# Patient Record
Sex: FEMALE | Race: WHITE | NOT HISPANIC OR LATINO | Employment: FULL TIME | URBAN - METROPOLITAN AREA
[De-identification: names, ages, dates, MRNs, and addresses within clinical notes are randomized per-mention and may not be internally consistent; named-entity substitution may affect disease eponyms.]

---

## 2018-01-23 ENCOUNTER — CLINICAL SUPPORT (OUTPATIENT)
Dept: INTERNAL MEDICINE | Facility: CLINIC | Age: 43
End: 2018-01-23
Payer: COMMERCIAL

## 2018-01-23 PROCEDURE — 90715 TDAP VACCINE 7 YRS/> IM: CPT | Mod: S$GLB,,, | Performed by: FAMILY MEDICINE

## 2018-01-23 PROCEDURE — 90471 IMMUNIZATION ADMIN: CPT | Mod: S$GLB,,, | Performed by: FAMILY MEDICINE

## 2018-01-26 ENCOUNTER — LAB VISIT (OUTPATIENT)
Dept: LAB | Facility: OTHER | Age: 43
End: 2018-01-26
Attending: INTERNAL MEDICINE
Payer: COMMERCIAL

## 2018-01-26 DIAGNOSIS — Z00.00 ANNUAL PHYSICAL EXAM: ICD-10-CM

## 2018-01-26 LAB
ALBUMIN SERPL BCP-MCNC: 4.1 G/DL
ALP SERPL-CCNC: 39 U/L
ALT SERPL W/O P-5'-P-CCNC: 11 U/L
ANION GAP SERPL CALC-SCNC: 9 MMOL/L
AST SERPL-CCNC: 18 U/L
BASOPHILS # BLD AUTO: 0.01 K/UL
BASOPHILS NFR BLD: 0.2 %
BILIRUB SERPL-MCNC: 1 MG/DL
BUN SERPL-MCNC: 14 MG/DL
CALCIUM SERPL-MCNC: 8.7 MG/DL
CHLORIDE SERPL-SCNC: 104 MMOL/L
CHOLEST SERPL-MCNC: 242 MG/DL
CHOLEST/HDLC SERPL: 4.5 {RATIO}
CO2 SERPL-SCNC: 26 MMOL/L
CREAT SERPL-MCNC: 0.9 MG/DL
DIFFERENTIAL METHOD: NORMAL
EOSINOPHIL # BLD AUTO: 0.1 K/UL
EOSINOPHIL NFR BLD: 1.6 %
ERYTHROCYTE [DISTWIDTH] IN BLOOD BY AUTOMATED COUNT: 12.1 %
EST. GFR  (AFRICAN AMERICAN): >60 ML/MIN/1.73 M^2
EST. GFR  (NON AFRICAN AMERICAN): >60 ML/MIN/1.73 M^2
ESTIMATED AVG GLUCOSE: 94 MG/DL
GLUCOSE SERPL-MCNC: 84 MG/DL
HBA1C MFR BLD HPLC: 4.9 %
HCT VFR BLD AUTO: 39.8 %
HDLC SERPL-MCNC: 54 MG/DL
HDLC SERPL: 22.3 %
HGB BLD-MCNC: 13.2 G/DL
LDLC SERPL CALC-MCNC: 165.6 MG/DL
LYMPHOCYTES # BLD AUTO: 2.2 K/UL
LYMPHOCYTES NFR BLD: 37.9 %
MCH RBC QN AUTO: 29.2 PG
MCHC RBC AUTO-ENTMCNC: 33.2 G/DL
MCV RBC AUTO: 88 FL
MONOCYTES # BLD AUTO: 0.5 K/UL
MONOCYTES NFR BLD: 8 %
NEUTROPHILS # BLD AUTO: 3 K/UL
NEUTROPHILS NFR BLD: 52.1 %
NONHDLC SERPL-MCNC: 188 MG/DL
PLATELET # BLD AUTO: 215 K/UL
PMV BLD AUTO: 11.3 FL
POTASSIUM SERPL-SCNC: 4.3 MMOL/L
PROT SERPL-MCNC: 7.1 G/DL
RBC # BLD AUTO: 4.52 M/UL
SODIUM SERPL-SCNC: 139 MMOL/L
TRIGL SERPL-MCNC: 112 MG/DL
TSH SERPL DL<=0.005 MIU/L-ACNC: 1.47 UIU/ML
WBC # BLD AUTO: 5.75 K/UL

## 2018-01-26 PROCEDURE — 84443 ASSAY THYROID STIM HORMONE: CPT

## 2018-01-26 PROCEDURE — 36415 COLL VENOUS BLD VENIPUNCTURE: CPT

## 2018-01-26 PROCEDURE — 80061 LIPID PANEL: CPT

## 2018-01-26 PROCEDURE — 85025 COMPLETE CBC W/AUTO DIFF WBC: CPT

## 2018-01-26 PROCEDURE — 80053 COMPREHEN METABOLIC PANEL: CPT

## 2018-01-26 PROCEDURE — 83036 HEMOGLOBIN GLYCOSYLATED A1C: CPT

## 2018-05-16 ENCOUNTER — HOSPITAL ENCOUNTER (OUTPATIENT)
Dept: RADIOLOGY | Facility: OTHER | Age: 43
Discharge: HOME | End: 2018-05-16
Attending: INTERNAL MEDICINE
Payer: COMMERCIAL

## 2018-05-16 ENCOUNTER — OFFICE VISIT (OUTPATIENT)
Dept: INTERNAL MEDICINE | Facility: CLINIC | Age: 43
End: 2018-05-16
Attending: INTERNAL MEDICINE
Payer: COMMERCIAL

## 2018-05-16 VITALS — WEIGHT: 157.19 LBS | BODY MASS INDEX: 25.26 KG/M2 | HEIGHT: 66 IN

## 2018-05-16 VITALS
WEIGHT: 157.19 LBS | HEART RATE: 68 BPM | DIASTOLIC BLOOD PRESSURE: 58 MMHG | SYSTOLIC BLOOD PRESSURE: 124 MMHG | BODY MASS INDEX: 25.26 KG/M2 | HEIGHT: 66 IN

## 2018-05-16 DIAGNOSIS — E04.1 THYROID NODULE: ICD-10-CM

## 2018-05-16 DIAGNOSIS — T78.3XXS ANGIOEDEMA, SEQUELA: ICD-10-CM

## 2018-05-16 DIAGNOSIS — Z12.39 BREAST CANCER SCREENING: ICD-10-CM

## 2018-05-16 DIAGNOSIS — Z00.00 ANNUAL PHYSICAL EXAM: Primary | ICD-10-CM

## 2018-05-16 DIAGNOSIS — Z88.9 MULTIPLE ALLERGIES: ICD-10-CM

## 2018-05-16 DIAGNOSIS — Z01.419 WELL WOMAN EXAM: ICD-10-CM

## 2018-05-16 PROBLEM — T78.3XXA ANGIO-EDEMA: Status: ACTIVE | Noted: 2018-05-16

## 2018-05-16 PROCEDURE — 99999 PR PBB SHADOW E&M-EST. PATIENT-LVL IV: CPT | Mod: PBBFAC,,, | Performed by: INTERNAL MEDICINE

## 2018-05-16 PROCEDURE — 77067 SCR MAMMO BI INCL CAD: CPT | Mod: 26,,, | Performed by: RADIOLOGY

## 2018-05-16 PROCEDURE — 77063 BREAST TOMOSYNTHESIS BI: CPT | Mod: 26,,, | Performed by: RADIOLOGY

## 2018-05-16 PROCEDURE — 77067 SCR MAMMO BI INCL CAD: CPT | Mod: TC

## 2018-05-16 PROCEDURE — 99396 PREV VISIT EST AGE 40-64: CPT | Mod: S$GLB,,, | Performed by: INTERNAL MEDICINE

## 2018-05-16 PROCEDURE — 76536 US EXAM OF HEAD AND NECK: CPT | Mod: TC

## 2018-05-16 PROCEDURE — 76536 US EXAM OF HEAD AND NECK: CPT | Mod: 26,,, | Performed by: INTERNAL MEDICINE

## 2018-05-16 RX ORDER — EPINEPHRINE 0.3 MG/.3ML
1 INJECTION SUBCUTANEOUS ONCE
Qty: 2 EACH | Refills: 5 | Status: SHIPPED | OUTPATIENT
Start: 2018-05-16 | End: 2018-12-18

## 2018-05-16 NOTE — PROGRESS NOTES
Subjective:       Patient ID: Karla Washington is a 43 y.o. female.    Chief Complaint: Sore Throat    Here for annual exam    Just so happens just a few days prior to appt she developed sloughing of skin inside her mouth followed by tongue and mouth ulcers with sharp pain. These symptoms persisted about 36-48 hours and as they resolved she developed sore throat. She reports her throat felt swollen along with her lips felt slightly swollen. She denies tongue involvement. She denies SOB, stridor or wheezing, dizziness, pruritis, rash, change in arthralgias from baseline, abd pain. She reports a Hx of similar but more pronounced episode in the past.     She is due for MMG.   Due for u/s of thyroid due to Hx of nodules. Euthyroid aside from intermittent fatigue      Sore Throat    This is a new problem. The current episode started in the past 7 days. The problem has been gradually worsening. The pain is worse on the left side. There has been no fever. The pain is at a severity of 7/10. The pain is moderate. Associated symptoms include ear pain, headaches, a hoarse voice, neck pain and trouble swallowing. Pertinent negatives include no abdominal pain, congestion, coughing, diarrhea, drooling, ear discharge, plugged ear sensation, shortness of breath, stridor, swollen glands or vomiting. She has had no exposure to strep or mono. She has tried NSAIDs, cool liquids and gargles for the symptoms. The treatment provided mild relief.       Review of Systems   HENT: Positive for ear pain, hoarse voice, sore throat and trouble swallowing. Negative for congestion, drooling and ear discharge.    Respiratory: Negative for cough, shortness of breath and stridor.    Gastrointestinal: Negative for abdominal pain, diarrhea and vomiting.   Musculoskeletal: Positive for neck pain.   Neurological: Positive for headaches.       Objective:      Vitals:    05/16/18 1304   BP: (!) 124/58   Pulse: 68   Weight: 71.3 kg (157 lb 3 oz)   Height: 5'  "6" (1.676 m)      Physical Exam   Constitutional: She is oriented to person, place, and time. She appears well-developed and well-nourished. No distress.   HENT:   Head: Normocephalic and atraumatic.   Mouth/Throat: Uvula is midline, oropharynx is clear and moist and mucous membranes are normal. Mucous membranes are not pale. No trismus in the jaw. No uvula swelling. No oropharyngeal exudate, posterior oropharyngeal edema, posterior oropharyngeal erythema or tonsillar abscesses.   Eyes: Conjunctivae and EOM are normal. Pupils are equal, round, and reactive to light. No scleral icterus.   Neck: No tracheal tenderness present. No thyromegaly present.   Cardiovascular: Normal rate, regular rhythm and normal heart sounds.    No murmur heard.  Pulmonary/Chest: Effort normal and breath sounds normal. No stridor. No respiratory distress. She has no decreased breath sounds. She has no wheezes. She has no rales.   Abdominal: Soft. She exhibits no distension. There is no tenderness.   Musculoskeletal: She exhibits no edema or tenderness.   Lymphadenopathy:     She has no cervical adenopathy.   Neurological: She is alert and oriented to person, place, and time.   Skin: Skin is warm and dry.   Psychiatric: She has a normal mood and affect. Her behavior is normal.       Assessment:       1. Annual physical exam    2. Well woman exam    3. Angioedema, sequela    4. Multiple allergies    5. Breast cancer screening    6. Thyroid nodule        Plan:       Karla was seen today for sore throat.    Diagnoses and all orders for this visit:    Annual physical exam  -     Comprehensive metabolic panel; Future  -     Lipid panel; Future  -     TSH; Future  -     CBC auto differential; Future  -     Hemoglobin A1c; Future    Well woman exam  -     Ambulatory Referral to Obstetrics / Gynecology    Angioedema, sequela  -episode resolved start H1 and H2 blockers  -     Ambulatory Referral to Allergy  -     EPINEPHrine (EPIPEN 2-MALDONADO) 0.3 mg/0.3 " mL AtIn; Inject 0.3 mLs (0.3 mg total) into the muscle once.    Multiple allergies  -     Ambulatory Referral to Allergy    Breast cancer screening  -     Cancel: Mammo Digital Screening Bilat with CAD; Future    Thyroid nodule  -     US Soft Tissue Head Neck Thyroid; Future    RTC in 1 year or sooner prn                    Side effects of medication(s) were discussed in detail and patient voiced understanding.  Patient will call back for any issues or complications.

## 2018-05-17 ENCOUNTER — PATIENT MESSAGE (OUTPATIENT)
Dept: INTERNAL MEDICINE | Facility: CLINIC | Age: 43
End: 2018-05-17

## 2018-05-17 DIAGNOSIS — Z91.89 INCREASED RISK OF BREAST CANCER: Primary | ICD-10-CM

## 2018-05-17 DIAGNOSIS — R21 RASH: Primary | ICD-10-CM

## 2018-05-18 ENCOUNTER — OFFICE VISIT (OUTPATIENT)
Dept: DERMATOLOGY | Facility: CLINIC | Age: 43
End: 2018-05-18
Payer: COMMERCIAL

## 2018-05-18 ENCOUNTER — OFFICE VISIT (OUTPATIENT)
Dept: OBSTETRICS AND GYNECOLOGY | Facility: CLINIC | Age: 43
End: 2018-05-18
Payer: COMMERCIAL

## 2018-05-18 VITALS
HEIGHT: 66 IN | BODY MASS INDEX: 25.56 KG/M2 | WEIGHT: 159.06 LBS | DIASTOLIC BLOOD PRESSURE: 64 MMHG | SYSTOLIC BLOOD PRESSURE: 114 MMHG

## 2018-05-18 DIAGNOSIS — K12.0 ORAL APHTHOUS ULCER: Primary | ICD-10-CM

## 2018-05-18 DIAGNOSIS — Z01.419 WOMEN'S ANNUAL ROUTINE GYNECOLOGICAL EXAMINATION: Primary | ICD-10-CM

## 2018-05-18 DIAGNOSIS — D18.00 ANGIOMA: ICD-10-CM

## 2018-05-18 PROCEDURE — 99999 PR PBB SHADOW E&M-EST. PATIENT-LVL II: CPT | Mod: PBBFAC,,, | Performed by: DERMATOLOGY

## 2018-05-18 PROCEDURE — 3008F BODY MASS INDEX DOCD: CPT | Mod: CPTII,S$GLB,, | Performed by: ADVANCED PRACTICE MIDWIFE

## 2018-05-18 PROCEDURE — 99999 PR PBB SHADOW E&M-EST. PATIENT-LVL III: CPT | Mod: PBBFAC,,, | Performed by: ADVANCED PRACTICE MIDWIFE

## 2018-05-18 PROCEDURE — 99202 OFFICE O/P NEW SF 15 MIN: CPT | Mod: S$GLB,,, | Performed by: DERMATOLOGY

## 2018-05-18 PROCEDURE — 99396 PREV VISIT EST AGE 40-64: CPT | Mod: S$GLB,,, | Performed by: ADVANCED PRACTICE MIDWIFE

## 2018-05-18 PROCEDURE — 88175 CYTOPATH C/V AUTO FLUID REDO: CPT

## 2018-05-18 NOTE — LETTER
May 18, 2018      Riana Arroyo MD  [address]  [place] [address]           [facility]  [address]  [place] [address]  Phone: [phone]  Fax: [phone]          Patient: Karla Washington   MR Number: [id number]   YOB: 1975   Date of Visit: 5/18/2018       Dear Dr. Riana Arroyo:    Thank you for referring Karla Washington to me for evaluation. Attached you will find relevant portions of my assessment and plan of care.    If you have questions, please do not hesitate to call me. I look forward to following Karla Washington along with you.    Sincerely,    Elle Smith MD    Enclosure  CC:  No Recipients    If you would like to receive this communication electronically, please contact [email or web address] or [phone] to request more information on [facility] Link access.    For providers and/or their staff who would like to refer a patient to [facility], please contact us through our one-stop-shop provider referral line, [facility], at [phone].    If you feel you have received this communication in error or would no longer like to receive these types of communications, please e-mail [email or web address]

## 2018-05-18 NOTE — PROGRESS NOTES
Subjective:       Patient ID:  Karla Washington is a 43 y.o. female who presents for   Chief Complaint   Patient presents with    Rash     in mouth, x 12 days, painful, peeling, no tx     Rash  - Initial  Affected locations: mouth.  Duration: 12 days  Signs / symptoms: pain  Relieving factors/Treatments tried: nothing    44 yo female with history of canker sores, longstanding, well controlled recently by limiting acidic foods.  Has had + recent life stressors.  About 2 weeks ago, patient developed peeling of the hard palate of top of mouth and extended down to her throat.  Had associated feeling of fullness and swelling in cheek, tongue and lip.  No shortness of breath.  She had no blisters there nor was this a one sided eruption.  No fevers/chills/antecedent viral symptoms.  No prior history of HSV.  No prior hx of SLE.  No new medications (takes NSAIDS and b12 sporadically)    Was seen by urgent care who gave her Epipen out of concern for allergic reaction. Saw allergist who did not feel consistent with angioedema.  Has not yet seen ENT.    Right now mouth has one small ulceration on right side that is healing.  Palate now better but slightly pale.    Review of Systems   Constitutional: Positive for fatigue (yesterday very tired). Negative for fever and chills.   HENT: Positive for sore throat, mouth sores, lip swelling and tongue swelling. Negative for trouble swallowing.    Musculoskeletal: Negative for joint swelling and arthralgias.   Skin: Positive for rash.   Psychiatric/Behavioral: Positive for high stress.   Hematologic/Lymphatic: Does not bruise/bleed easily.        Objective:    Physical Exam   Constitutional: She appears well-developed and well-nourished. No distress.   HENT:   Mouth/Throat:       Neurological: She is alert and oriented to person, place, and time. She is not disoriented.   Psychiatric: She has a normal mood and affect.   Skin:   Areas Examined (abnormalities noted in diagram):   Head /  Face Inspection Performed  Neck Inspection Performed  LLE Inspection Performed              Diagram Legend     Erythematous scaling macule/papule c/w actinic keratosis       Vascular papule c/w angioma      Pigmented verrucoid papule/plaque c/w seborrheic keratosis      Yellow umbilicated papule c/w sebaceous hyperplasia      Irregularly shaped tan macule c/w lentigo     1-2 mm smooth white papules consistent with Milia      Movable subcutaneous cyst with punctum c/w epidermal inclusion cyst      Subcutaneous movable cyst c/w pilar cyst      Firm pink to brown papule c/w dermatofibroma      Pedunculated fleshy papule(s) c/w skin tag(s)      Evenly pigmented macule c/w junctional nevus     Mildly variegated pigmented, slightly irregular-bordered macule c/w mildly atypical nevus      Flesh colored to evenly pigmented papule c/w intradermal nevus       Pink pearly papule/plaque c/w basal cell carcinoma      Erythematous hyperkeratotic cursted plaque c/w SCC      Surgical scar with no sign of skin cancer recurrence      Open and closed comedones      Inflammatory papules and pustules      Verrucoid papule consistent consistent with wart     Erythematous eczematous patches and plaques     Dystrophic onycholytic nail with subungual debris c/w onychomycosis     Umbilicated papule    Erythematous-base heme-crusted tan verrucoid plaque consistent with inflamed seborrheic keratosis     Erythematous Silvery Scaling Plaque c/w Psoriasis     See annotation    Lab Results   Component Value Date    WBC 5.41 05/16/2018    HGB 13.9 05/16/2018    HCT 42.0 05/16/2018    MCV 91 05/16/2018     05/16/2018     BMP  Lab Results   Component Value Date     05/16/2018    K 3.7 05/16/2018     05/16/2018    CO2 27 05/16/2018    BUN 7 05/16/2018    CREATININE 0.9 05/16/2018    CALCIUM 9.3 05/16/2018    ANIONGAP 10 05/16/2018    ESTGFRAFRICA >60 05/16/2018    EGFRNONAA >60 05/16/2018       Assessment / Plan:        Oral  "aphthous ulcer - I see one small healing canker sore today with classic appearance, consistent with her prior history of canker sores.  Stress is a well known cause of aphthous stomatitis.      I discussed the dermatologic conditions that can lead to recurrent oral ulcerations - autoimmune conditions such as SLE, Behcets, cicatricial pemphigoid, pemphigus vulgaris.  Viruses such as HSV, CMV in immunosuppressed individuals (which she is not).  She has no history of IBD.  Contact allergies to metals of fillings (she has had her changed out to try to help, which it did not) and new toothpastes (she has not changed).   No antecedent infections or medication use. No history of genital ulcers. No history of blisters on the skin, lips, genitals, ocular mucosa.    Given her ongoing dysphagia, I advise evaluation by ENT.  Apparently in the past she was told that she had "blood blisters" in her throat.   I would appreciate their input and evaluation.    She declines treatment at this time as the current canker sore is healing; typically I treat with high potency topical steroid ointment in orabase gel, or viscous lidocaine.    -     Ambulatory Referral to ENT    Angioma    This is a benign vascular lesion. Reassurance given. No treatment required.              Follow-up if symptoms worsen or fail to improve, for for TBSE.  "

## 2018-05-18 NOTE — PROGRESS NOTES
HISTORY OF PRESENT ILLNESS:    Karla Washington is a 43 y.o. female, , Patient's last menstrual period was 2018 (exact date).,  presents for a routine annual exam . Pt has no complaints or concerns.    Past Medical History:   Diagnosis Date    Abnormal Pap smear of cervix     Angio-edema     Fibroid of cervix        Past Surgical History:   Procedure Laterality Date    TONSILLECTOMY         MEDICATIONS AND ALLERGIES:      Current Outpatient Prescriptions:     l-methylfolate-b2-b6-b12 (CEREFOLIN) 6-5-50-1 mg Tab, Take 1 tablet by mouth once daily., Disp: , Rfl:     naproxen sodium (ANAPROX) 220 MG tablet, Take 2 tablets (440 mg total) by mouth 2 (two) times daily with meals., Disp: , Rfl:     EPINEPHrine (EPIPEN 2-MALDONADO) 0.3 mg/0.3 mL AtIn, Inject 0.3 mLs (0.3 mg total) into the muscle once., Disp: 2 each, Rfl: 5    Review of patient's allergies indicates:   Allergen Reactions    Pcn [penicillins] Hives       Family History   Problem Relation Age of Onset    Thyroid disease Mother     Melanoma Mother     Alcohol abuse Father     Alcohol abuse Sister     Alcohol abuse Brother     Alcohol abuse Maternal Aunt     Thyroid disease Sister     Thyroid disease Paternal Grandmother     Colon cancer Maternal Grandmother     Breast cancer Paternal Aunt     Ovarian cancer Neg Hx        Social History     Social History    Marital status:      Spouse name: N/A    Number of children: 0    Years of education: N/A     Occupational History    Not on file.     Social History Main Topics    Smoking status: Never Smoker    Smokeless tobacco: Never Used    Alcohol use 3.6 oz/week     6 Glasses of wine per week      Comment: occ    Drug use: No    Sexual activity: Yes     Partners: Male     Birth control/ protection: None     Other Topics Concern    Not on file     Social History Narrative    The patient does exercise regularly (5-6x/week; ).    Rates diet as good.    She is  "not satisfied with weight.               COMPREHENSIVE GYN HISTORY:  PAP History: Denies abnormal Paps.  Infection History: Denies STDs. Denies PID.  Benign History: Denies uterine fibroids. Denies ovarian cysts. Denies endometriosis. Denies other conditions.  Cancer History: Denies cervical cancer. Denies uterine cancer or hyperplasia. Denies ovarian cancer. Denies vulvar cancer or pre-cancer. Denies vaginal cancer or pre-cancer. Denies breast cancer. Denies colon cancer.  Sexual Activity History:  currently  sexually active  Menstrual History: Monthly  Dysmenorrhea History:None  Contraception:None- hx infertility    ROS:  GENERAL: No weight changes. No swelling. No fatigue. No fever.  CARDIOVASCULAR: No chest pain. No shortness of breath. No leg cramps.   NEUROLOGICAL: No headaches. No vision changes.  BREASTS: No pain. No lumps. No discharge.  ABDOMEN: No pain. No nausea. No vomiting. No diarrhea. No constipation.  REPRODUCTIVE: No abnormal bleeding.   VULVA: No pain. No lesions. No itching.  VAGINA: No relaxation. No itching. No odor. No discharge. No lesions.  URINARY: No incontinence. No nocturia. No frequency. No dysuria.    /64   Ht 5' 6" (1.676 m)   Wt 72.1 kg (159 lb 1 oz)   LMP 05/01/2018 (Exact Date)   BMI 25.67 kg/m²     PE:  APPEARANCE: Well nourished, well developed, in no acute distress.  AFFECT: WNL, alert and oriented x 3. Interactive during exam  SKIN: No acne or hirsutism.  NECK: Neck symmetric, without masses or thyromegaly.  NODES: No inguinal, axillary or femoral lymph node enlargement.  CHEST: Good respiratory effort.   ABDOMEN: Soft. No tenderness or masses. No hepatosplenomegaly. No hernias.  BREASTS: Symmetrical, no skin changes, visible lesions, palpable masses or nipple discharge bilaterally.  PELVIC: External female genitalia without lesions.  Female hair distribution. Adequate perineal body, Normal urethral meatus. Vagina moist and well rugated without lesions or discharge.  " No significant cystocele or rectocele present. Cervix pink without lesions, discharge or tenderness. Spotting with PAP Uterus is 4-6 week size, regular, mobile and nontender. Adnexa without masses or tenderness.  EXTREMITIES: No edema    PROCEDURES:  Pap    DIAGNOSIS:  1. Gyn exam    LABS AND TESTS ORDERED:None    MEDICATIONS PRESCRIBED:None    COUNSELING:  The patient was counseled today on:  -A.C.S. Pap and pelvic exam guidelines, recomendations for  mammogram, monthly self breast exams and to follow up with her PCP for other health maintenance.    FOLLOW-UP with me annually.

## 2018-05-21 ENCOUNTER — LAB VISIT (OUTPATIENT)
Dept: LAB | Facility: OTHER | Age: 43
End: 2018-05-21
Attending: INTERNAL MEDICINE
Payer: COMMERCIAL

## 2018-05-21 DIAGNOSIS — T78.3XXS ANGIOEDEMA, SEQUELA: ICD-10-CM

## 2018-05-21 LAB
C4 SERPL-MCNC: 20 MG/DL
CRP SERPL-MCNC: 1.2 MG/L
ERYTHROCYTE [SEDIMENTATION RATE] IN BLOOD BY WESTERGREN METHOD: 2 MM/HR

## 2018-05-21 PROCEDURE — 86140 C-REACTIVE PROTEIN: CPT

## 2018-05-21 PROCEDURE — 36415 COLL VENOUS BLD VENIPUNCTURE: CPT

## 2018-05-21 PROCEDURE — 85651 RBC SED RATE NONAUTOMATED: CPT

## 2018-05-21 PROCEDURE — 86160 COMPLEMENT ANTIGEN: CPT

## 2018-05-22 ENCOUNTER — PATIENT MESSAGE (OUTPATIENT)
Dept: INTERNAL MEDICINE | Facility: CLINIC | Age: 43
End: 2018-05-22

## 2018-05-22 ENCOUNTER — PATIENT MESSAGE (OUTPATIENT)
Dept: DERMATOLOGY | Facility: CLINIC | Age: 43
End: 2018-05-22

## 2018-05-23 ENCOUNTER — PATIENT MESSAGE (OUTPATIENT)
Dept: OBSTETRICS AND GYNECOLOGY | Facility: CLINIC | Age: 43
End: 2018-05-23

## 2018-05-23 ENCOUNTER — TELEPHONE (OUTPATIENT)
Dept: DERMATOLOGY | Facility: CLINIC | Age: 43
End: 2018-05-23

## 2018-05-23 DIAGNOSIS — K12.0 RECURRENT CANKER SORES: Primary | ICD-10-CM

## 2018-05-23 RX ORDER — CLOBETASOL PROPIONATE 0.05 MG/G
GEL TOPICAL 2 TIMES DAILY
Qty: 30 G | Refills: 2 | Status: SHIPPED | OUTPATIENT
Start: 2018-05-23 | End: 2018-12-18

## 2018-05-23 NOTE — TELEPHONE ENCOUNTER
Saw patient last week for evaluation of an aphthous ulcer, + recent life stressors.      She called today to report a new oral ulcer yesterday as well as a vulvar ulceration.  Both are quite painful.    No prior hx of HSV2 or 1, monogamous with  x 10 yrs who has had no herpes in past either.    Recent sore throat also thought to be apthous stomatitis vs viral - no fevers or lymphadenopathy as seen with mono/EBV which can also cause genital ulcers.    She on Sunday developed a headache after 1 alcoholic beverage and sun exposure - has been squinting in the sun since and needed to use 's reading glasses for sewing yesterday, which is unusual for her.    Rx sent for clobetasol ointment for ulcerations to help healing - advise she be evaluated by opthomalogy and rheumatology for possible Behcet's.

## 2018-05-29 ENCOUNTER — INITIAL CONSULT (OUTPATIENT)
Dept: OPHTHALMOLOGY | Facility: CLINIC | Age: 43
End: 2018-05-29
Payer: COMMERCIAL

## 2018-05-29 DIAGNOSIS — H53.143 PHOTOPHOBIA OF BOTH EYES: Primary | ICD-10-CM

## 2018-05-29 DIAGNOSIS — H52.7 REFRACTIVE ERRORS: ICD-10-CM

## 2018-05-29 DIAGNOSIS — K12.1 ORAL ULCER: ICD-10-CM

## 2018-05-29 PROCEDURE — 99999 PR PBB SHADOW E&M-EST. PATIENT-LVL II: CPT | Mod: PBBFAC,,, | Performed by: OPHTHALMOLOGY

## 2018-05-29 PROCEDURE — 92004 COMPRE OPH EXAM NEW PT 1/>: CPT | Mod: S$GLB,,, | Performed by: OPHTHALMOLOGY

## 2018-05-29 RX ORDER — MULTIVIT WITH MINERALS/HERBS
1 TABLET ORAL DAILY
COMMUNITY

## 2018-05-29 NOTE — PROGRESS NOTES
HPI     Referred by her Dermatologist - Dr. Elle Smith    No eyedrops  No eye surgery     Pt states she has sensitivity to light OU. Pt states she has had floaters   OU since she was 9 years old. Pt states she had a giant floater with grey   in the middle  in the center OS x 2 episodes.  Pt states she has itching   OU. Pt denies flashes or eye pain.    Last edited by Maame Reis MD on 5/29/2018  9:14 AM. (History)            Assessment /Plan     For exam results, see Encounter Report.    Photophobia of both eyes    Oral ulcer    Refractive errors      History of oral and vulval ulcers     Photophobia  - no evidence of uveitis on exam today    Pt to f/up with rheumatology for more extensive autoimmune w/up and evaluation, may benefit from HLA-B51    Refractive Error  - pt has natural monovision - no need for rx at this time.

## 2018-06-12 ENCOUNTER — PATIENT MESSAGE (OUTPATIENT)
Dept: OBSTETRICS AND GYNECOLOGY | Facility: CLINIC | Age: 43
End: 2018-06-12

## 2018-11-07 ENCOUNTER — PATIENT MESSAGE (OUTPATIENT)
Dept: INTERNAL MEDICINE | Facility: CLINIC | Age: 43
End: 2018-11-07

## 2018-11-07 DIAGNOSIS — R20.0 NUMBNESS OF TOES: Primary | ICD-10-CM

## 2018-11-08 ENCOUNTER — TELEPHONE (OUTPATIENT)
Dept: INTERNAL MEDICINE | Facility: CLINIC | Age: 43
End: 2018-11-08

## 2018-11-08 NOTE — TELEPHONE ENCOUNTER
Left voice message for patient to schedule appointment from referral to Podiatry Clinic.  Tonio SPENCER  [phone]

## 2018-11-15 ENCOUNTER — TELEPHONE (OUTPATIENT)
Dept: INTERNAL MEDICINE | Facility: CLINIC | Age: 43
End: 2018-11-15

## 2019-05-06 ENCOUNTER — OFFICE VISIT (OUTPATIENT)
Dept: OPTOMETRY | Facility: CLINIC | Age: 44
End: 2019-05-06
Payer: COMMERCIAL

## 2019-05-06 DIAGNOSIS — H43.392 VITREOUS FLOATERS OF LEFT EYE: Primary | ICD-10-CM

## 2019-05-06 PROCEDURE — 92004 COMPRE OPH EXAM NEW PT 1/>: CPT | Mod: S$GLB,,, | Performed by: OPTOMETRIST

## 2019-05-06 PROCEDURE — 99999 PR PBB SHADOW E&M-EST. PATIENT-LVL III: CPT | Mod: PBBFAC,,, | Performed by: OPTOMETRIST

## 2019-05-06 PROCEDURE — 92004 PR EYE EXAM, NEW PATIENT,COMPREHESV: ICD-10-PCS | Mod: S$GLB,,, | Performed by: OPTOMETRIST

## 2019-05-06 PROCEDURE — 99999 PR PBB SHADOW E&M-EST. PATIENT-LVL III: ICD-10-PCS | Mod: PBBFAC,,, | Performed by: OPTOMETRIST

## 2019-05-06 NOTE — PROGRESS NOTES
HPI     Pt is in for floater OS  Denies flashes OU   Large  New floater has been there since November  Centrally located,causing blurry va   overcast days notices it more.   Long, jagged in shape  Feels tired and achy towards the end of the day     Last edited by Nicky Mace on 5/6/2019  9:00 AM. (History)        ROS     Negative for: Constitutional, Gastrointestinal, Neurological, Skin,   Genitourinary, Musculoskeletal, HENT, Endocrine, Cardiovascular, Eyes,   Respiratory, Psychiatric, Allergic/Imm, Heme/Lymph    Last edited by Zaynab Trinh, OD on 5/6/2019  9:35 AM. (History)        Assessment /Plan     For exam results, see Encounter Report.    Vitreous floaters of left eye    No e/o h/b/t 360 degrees OU. Monitor for worsening of symptoms or S/Sx of RD. RTC 1 mo DFE

## 2019-06-18 ENCOUNTER — HOSPITAL ENCOUNTER (OUTPATIENT)
Dept: RADIOLOGY | Facility: OTHER | Age: 44
Discharge: HOME | End: 2019-06-18
Attending: INTERNAL MEDICINE
Payer: COMMERCIAL

## 2019-06-18 ENCOUNTER — TELEPHONE (OUTPATIENT)
Dept: INTERNAL MEDICINE | Facility: CLINIC | Age: 44
End: 2019-06-18

## 2019-06-18 VITALS — HEIGHT: 66 IN | WEIGHT: 159 LBS | BODY MASS INDEX: 25.55 KG/M2

## 2019-06-18 DIAGNOSIS — Z12.39 BREAST CANCER SCREENING: Primary | ICD-10-CM

## 2019-06-18 DIAGNOSIS — Z12.39 BREAST CANCER SCREENING: ICD-10-CM

## 2019-06-18 PROCEDURE — 77067 SCR MAMMO BI INCL CAD: CPT | Mod: 26,,, | Performed by: INTERNAL MEDICINE

## 2019-06-18 PROCEDURE — 77067 MAMMO DIGITAL SCREENING BILAT WITH TOMOSYNTHESIS_CAD: ICD-10-PCS | Mod: 26,,, | Performed by: INTERNAL MEDICINE

## 2019-06-18 PROCEDURE — 77067 SCR MAMMO BI INCL CAD: CPT | Mod: TC

## 2019-06-18 PROCEDURE — 77063 BREAST TOMOSYNTHESIS BI: CPT | Mod: 26,,, | Performed by: INTERNAL MEDICINE

## 2019-06-18 PROCEDURE — 77063 MAMMO DIGITAL SCREENING BILAT WITH TOMOSYNTHESIS_CAD: ICD-10-PCS | Mod: 26,,, | Performed by: INTERNAL MEDICINE

## 2019-06-18 NOTE — TELEPHONE ENCOUNTER
----- Message from Aleyda Alvarez sent at 6/18/2019  9:00 AM CDT -----  Contact: Self  Name of Who is Calling: YAW DAVID [[id number]]      What is the request in detail: pt would like orders so that she can schedule for annual mammo. Please contact to further discuss and advise.      Can the clinic reply by [facility]: Y       What Number to Call Back if not in [facility]: [phone]

## 2019-06-18 NOTE — TELEPHONE ENCOUNTER
Spoke to Ms. Washington to confirm date and time for mammo.  Patient states understanding with no further questions or concerns.

## 2019-06-24 ENCOUNTER — OFFICE VISIT (OUTPATIENT)
Dept: OPTOMETRY | Facility: CLINIC | Age: 44
End: 2019-06-24
Payer: COMMERCIAL

## 2019-06-24 DIAGNOSIS — H43.393 VISUAL FLOATERS, BILATERAL: Primary | ICD-10-CM

## 2019-06-24 PROCEDURE — 99999 PR PBB SHADOW E&M-EST. PATIENT-LVL III: ICD-10-PCS | Mod: PBBFAC,,, | Performed by: OPTOMETRIST

## 2019-06-24 PROCEDURE — 92014 COMPRE OPH EXAM EST PT 1/>: CPT | Mod: S$GLB,,, | Performed by: OPTOMETRIST

## 2019-06-24 PROCEDURE — 99999 PR PBB SHADOW E&M-EST. PATIENT-LVL III: CPT | Mod: PBBFAC,,, | Performed by: OPTOMETRIST

## 2019-06-24 PROCEDURE — 92014 PR EYE EXAM, EST PATIENT,COMPREHESV: ICD-10-PCS | Mod: S$GLB,,, | Performed by: OPTOMETRIST

## 2019-06-24 RX ORDER — CETIRIZINE HYDROCHLORIDE 5 MG/1
5 TABLET ORAL DAILY
COMMUNITY
End: 2021-10-05

## 2019-06-24 NOTE — PROGRESS NOTES
HPI     Pt is returning back for floater check OS.    Pt states that she still has the floater in her OS. The floater is still   like a grey in color and pt reports that she focusing more on the floater   and can not really see around it now. Pt reports that the floater has not   really changed but her OS is straining now. OD is stable. Denies any signs   of flashes OU. No use of eye gtts at this time.     Last edited by Isela Serrano on 6/24/2019  8:30 AM. (History)        ROS     Negative for: Constitutional, Gastrointestinal, Neurological, Skin,   Genitourinary, Musculoskeletal, HENT, Endocrine, Cardiovascular, Eyes,   Respiratory, Psychiatric, Allergic/Imm, Heme/Lymph    Last edited by Zaynab Trinh, OD on 6/24/2019  9:09 AM. (History)        Assessment /Plan     For exam results, see Encounter Report.    Visual floaters, bilateral      Pt reports stable symptoms. No e/o h/b/t 360 degrees OU. Monitor for worsening of symptoms or S/Sx of RD.     Last exam was w/Dr Reis since she is her cousin.   RTC 1 year Routine

## 2019-11-07 ENCOUNTER — PATIENT MESSAGE (OUTPATIENT)
Dept: INTERNAL MEDICINE | Facility: CLINIC | Age: 44
End: 2019-11-07

## 2019-11-07 DIAGNOSIS — N64.4 BREAST PAIN: Primary | ICD-10-CM

## 2019-11-13 ENCOUNTER — TELEPHONE (OUTPATIENT)
Dept: SURGERY | Facility: CLINIC | Age: 44
End: 2019-11-13

## 2019-11-13 NOTE — TELEPHONE ENCOUNTER
Left message for pt to call back and get scheduled for her appointment ----- Message from Danya Velasquez MA sent at 11/13/2019  4:32 PM CST -----  Contact: pt#[phone]  Can see Zohreh Hagan  ----- Message -----  From: Nany Sousa  Sent: 11/13/2019   1:47 PM CST  To: Freddy WEAVER Staff    Pt has a referral from Dr Gomez for Breast pain and she wants to see Dr Hayes

## 2020-01-21 ENCOUNTER — OFFICE VISIT (OUTPATIENT)
Dept: SURGERY | Facility: CLINIC | Age: 45
End: 2020-01-21
Payer: COMMERCIAL

## 2020-01-21 VITALS
DIASTOLIC BLOOD PRESSURE: 79 MMHG | SYSTOLIC BLOOD PRESSURE: 123 MMHG | BODY MASS INDEX: 28.77 KG/M2 | WEIGHT: 179 LBS | HEIGHT: 66 IN | HEART RATE: 95 BPM | TEMPERATURE: 98 F

## 2020-01-21 DIAGNOSIS — Z91.89 AT HIGH RISK FOR BREAST CANCER: ICD-10-CM

## 2020-01-21 DIAGNOSIS — Z12.31 BREAST CANCER SCREENING BY MAMMOGRAM: Primary | ICD-10-CM

## 2020-01-21 DIAGNOSIS — N63.10 MASSES OF BOTH BREASTS: ICD-10-CM

## 2020-01-21 DIAGNOSIS — N63.20 MASSES OF BOTH BREASTS: ICD-10-CM

## 2020-01-21 PROCEDURE — 99999 PR PBB SHADOW E&M-EST. PATIENT-LVL IV: ICD-10-PCS | Mod: PBBFAC,,, | Performed by: PHYSICIAN ASSISTANT

## 2020-01-21 PROCEDURE — 99999 PR PBB SHADOW E&M-EST. PATIENT-LVL IV: CPT | Mod: PBBFAC,,, | Performed by: PHYSICIAN ASSISTANT

## 2020-01-21 PROCEDURE — 99203 OFFICE O/P NEW LOW 30 MIN: CPT | Mod: S$GLB,,, | Performed by: PHYSICIAN ASSISTANT

## 2020-01-21 PROCEDURE — 3008F PR BODY MASS INDEX (BMI) DOCUMENTED: ICD-10-PCS | Mod: CPTII,S$GLB,, | Performed by: PHYSICIAN ASSISTANT

## 2020-01-21 PROCEDURE — 3008F BODY MASS INDEX DOCD: CPT | Mod: CPTII,S$GLB,, | Performed by: PHYSICIAN ASSISTANT

## 2020-01-21 PROCEDURE — 99203 PR OFFICE/OUTPT VISIT, NEW, LEVL III, 30-44 MIN: ICD-10-PCS | Mod: S$GLB,,, | Performed by: PHYSICIAN ASSISTANT

## 2020-01-21 NOTE — PROGRESS NOTES
BREAST HIGH RISK CLINIC  [facility]  Breast Surgery      Date of Visit:  2020    Referring provider:  Geovani Gomez MD  [address]  [address]  [place] [address]    PCP:  Geovani Gomez MD    HIGH RISK    Karla Washington is a 45 y.o. premenopausal female referred for evaluation of an increased risk of breast cancer based on her Tyrer-Cuzick score.  She is here today to discuss options of high risk screening and risk reduction.  She also complains of a sharp pain at her left inner breast that she first noticed 2 months ago.  She describes it as worse with movement and non-cyclical.  She says it has improved over the past few weeks and mostly only hurts when touched or lying down on her stomach.      Other breast cancer risk factors include family hx on father's side, nulliparous and family hx of colon CA.     She denies any history of breast biopsies.  She denies any nipple discharge or palpating any breast masses bilaterally.      Personal history of cancer: no  Genetic testing: none  Ashkenazi inheritance: no  OB/Gyn history:    Number of pregnancies & age at first gestation:    Age of menarche & menopause:13; N/A   Last menstrual period:20   Body mass index is 28.89 kg/m².   Contraceptive Use/ HRT: Denies HRT; OCP for 6 years.   Breastfeeding:N/A   Number of previous biopsies:0    Tyrer-Cuzick Score: 23.2% (re-calculated in clinic today).      Family History: Mother had a melanoma.  Maternal grandmother had colon cancer in her 70's.  Paternal aunt had breast cancer at age 42 and  at 79.  Father has laryngeal cancer.    Past Medical History:   Diagnosis Date    Abnormal Pap smear of cervix     Angio-edema     Fibroid of cervix      Social History     Socioeconomic History    Marital status:      Spouse name: Not on file    Number of children: 0    Years of education: Not on file    Highest education level: Not on file   Occupational History    Not on  file   Social Needs    Financial resource strain: Not on file    Food insecurity:     Worry: Not on file     Inability: Not on file    Transportation needs:     Medical: Not on file     Non-medical: Not on file   Tobacco Use    Smoking status: Never Smoker    Smokeless tobacco: Never Used   Substance and Sexual Activity    Alcohol use: Yes     Alcohol/week: 6.0 standard drinks     Types: 6 Glasses of wine per week     Comment: occ    Drug use: No    Sexual activity: Yes     Partners: Male     Birth control/protection: None   Lifestyle    Physical activity:     Days per week: Not on file     Minutes per session: Not on file    Stress: Not on file   Relationships    Social connections:     Talks on phone: Not on file     Gets together: Not on file     Attends Holiness service: Not on file     Active member of club or organization: Not on file     Attends meetings of clubs or organizations: Not on file     Relationship status: Not on file   Other Topics Concern    Are you pregnant or think you may be? Not Asked    Breast-feeding Not Asked   Social History Narrative    The patient does exercise regularly (5-6x/week; ).    Rates diet as good.    She is not satisfied with weight.         Review of Systems: Denies any fever or chills.  Denies any chest pain or shortness of breath.       Objective:     Vitals:    20 1408   BP: 123/79   Pulse: 95   Temp: 98.2 °F (36.8 °C)      Physical Exam:  HEENT: Normocephalic, atraumatic.    General: alert and oriented; no acute distress.  Breast: ~1 cm right breast cyst vs. Mass at the 5 o'clock position of the right breast.  ~1 cm very tender left breast mass at the 8-9 o'clock position of the left breast, ~1 cm from the NAC.  No nipple discharge, nipple inversion, or skin changes bilaterally.  Lymph: No adjacent axillary lymphadenopathy bilaterally.     Imagin19 Bilateral screening mammogram: BIRADS 1, negative.    Assessment:     This is  a 45 y.o. female with an increased risk of breast cancer based on Tyrer Cuzick score of 23.2%.      Plan:   The patient's estimated lifetime risk of Breast Cancer (to age 85) based on the Tyrer-Cuzick 7 risk assessment model is 23.2 %.  According to the American Cancer Society, patients with a lifetime breast cancer risk of 20% or higher might benefit from supplemental screening tests.    We discussed that she would benefit from annual MRI's in addition to yearly mammograms, alternating imaging every 6 months until age 75.  The pros and cons of MRI screening were presented.  I also recommended two physical exams per year, one can be with her OB/GYN.  Risk reduction options with chemoprevention such as Tamoxifen or Raloxifene were discussed.  These have been shown to lower the risk of breast cancer incidence, however there is no survival benefit in patients who don't have breast cancer.  I explained the most common side effects and risks including hot flashes, thromboembolism, stroke, endometrial cancer, weight changes, and pain.   We also discussed modifiable measures that affect breast cancer risk including diet, exercise, avoiding obesity, and limiting alcohol intake. I recommended at least 30 minutes of cardiovascular exercise 4-5 times per week.  Exercise can lower the relative risk of breast cancer by ~18-20%.  We also discussed that obesity is linked to a higher risk of breast cancer; therefore, exercise is very important.  I recommended proper nutrition with fresh fruits and vegetables and lean meats and avoidance of processed foods.  Non-modifiable risk factors were also discussed such as age at menarche, age at menopause, family history, and age at first pregnancy.  We did discuss hormone replacement therapy as well.  In patients at increased risk, I usually do not recommend HRT for long periods of time.      I recommended an MRI of the breast within the next 2 weeks.   Since her father is not able to get  genetic testing and her paternal aunt was diagnosed with breast cancer at such a young age, she is a candidate for genetic testing and agreed to proceed with this.      I ordered a bilateral mammogram and ultrasound to further evaluate the tender masses noted on exam today.  These are clinically likely benign cysts.    I recommended evening primrose oil tablets for relief of breast pain along with decreasing her caffeine intake.      She can follow up with me in 6 months.    Zohreh Gottlieb PA-C

## 2020-01-21 NOTE — LETTER
January 21, 2020      Geovani Gomez MD  [address]  [address]  [facility] [address]           [facility]  [address]  [facility] [address]  Phone: [phone]  Fax: [phone]          Patient: Karla Washington   MR Number: [id number]   YOB: 1975   Date of Visit: 1/21/2020       Dear Dr. Geovani Gomez:    Thank you for referring Karla Washington to me for evaluation. Attached you will find relevant portions of my assessment and plan of care.    If you have questions, please do not hesitate to call me. I look forward to following Karla Washington along with you.    Sincerely,    MARISEL Hernandez    Enclosure  CC:  No Recipients    If you would like to receive this communication electronically, please contact [email or web address] or [phone] to request more information on [email or web address] Link access.    For providers and/or their staff who would like to refer a patient to [facility], please contact us through our one-stop-shop provider referral line, [facility], at [phone].    If you feel you have received this communication in error or would no longer like to receive these types of communications, please e-mail [email or web address]

## 2020-01-23 ENCOUNTER — HOSPITAL ENCOUNTER (OUTPATIENT)
Dept: RADIOLOGY | Facility: HOSPITAL | Age: 45
Discharge: HOME | End: 2020-01-23
Attending: PHYSICIAN ASSISTANT
Payer: COMMERCIAL

## 2020-01-23 ENCOUNTER — OFFICE VISIT (OUTPATIENT)
Dept: INTERNAL MEDICINE | Facility: CLINIC | Age: 45
End: 2020-01-23
Payer: COMMERCIAL

## 2020-01-23 VITALS
WEIGHT: 175.94 LBS | HEART RATE: 94 BPM | HEIGHT: 66 IN | OXYGEN SATURATION: 96 % | BODY MASS INDEX: 28.27 KG/M2 | SYSTOLIC BLOOD PRESSURE: 102 MMHG | DIASTOLIC BLOOD PRESSURE: 66 MMHG

## 2020-01-23 DIAGNOSIS — J32.9 SINUSITIS, UNSPECIFIED CHRONICITY, UNSPECIFIED LOCATION: Primary | ICD-10-CM

## 2020-01-23 DIAGNOSIS — N63.10 MASSES OF BOTH BREASTS: ICD-10-CM

## 2020-01-23 DIAGNOSIS — N63.20 MASSES OF BOTH BREASTS: ICD-10-CM

## 2020-01-23 PROCEDURE — 76642 ULTRASOUND BREAST LIMITED: CPT | Mod: 26,,, | Performed by: RADIOLOGY

## 2020-01-23 PROCEDURE — 76642 ULTRASOUND BREAST LIMITED: CPT | Mod: TC,50,PO

## 2020-01-23 PROCEDURE — 99999 PR PBB SHADOW E&M-EST. PATIENT-LVL III: CPT | Mod: PBBFAC,,, | Performed by: INTERNAL MEDICINE

## 2020-01-23 PROCEDURE — 99213 PR OFFICE/OUTPT VISIT, EST, LEVL III, 20-29 MIN: ICD-10-PCS | Mod: S$GLB,,, | Performed by: INTERNAL MEDICINE

## 2020-01-23 PROCEDURE — 77062 MAMMO DIGITAL DIAGNOSTIC BILAT WITH TOMOSYNTHESIS_CAD: ICD-10-PCS | Mod: 26,,, | Performed by: RADIOLOGY

## 2020-01-23 PROCEDURE — 77066 DX MAMMO INCL CAD BI: CPT | Mod: TC,PO

## 2020-01-23 PROCEDURE — 76642 US BREAST BILATERAL LIMITED: ICD-10-PCS | Mod: 26,,, | Performed by: RADIOLOGY

## 2020-01-23 PROCEDURE — 99999 PR PBB SHADOW E&M-EST. PATIENT-LVL III: ICD-10-PCS | Mod: PBBFAC,,, | Performed by: INTERNAL MEDICINE

## 2020-01-23 PROCEDURE — 77066 MAMMO DIGITAL DIAGNOSTIC BILAT WITH TOMOSYNTHESIS_CAD: ICD-10-PCS | Mod: 26,,, | Performed by: RADIOLOGY

## 2020-01-23 PROCEDURE — 3008F BODY MASS INDEX DOCD: CPT | Mod: CPTII,S$GLB,, | Performed by: INTERNAL MEDICINE

## 2020-01-23 PROCEDURE — 77066 DX MAMMO INCL CAD BI: CPT | Mod: 26,,, | Performed by: RADIOLOGY

## 2020-01-23 PROCEDURE — 77062 BREAST TOMOSYNTHESIS BI: CPT | Mod: 26,,, | Performed by: RADIOLOGY

## 2020-01-23 PROCEDURE — 3008F PR BODY MASS INDEX (BMI) DOCUMENTED: ICD-10-PCS | Mod: CPTII,S$GLB,, | Performed by: INTERNAL MEDICINE

## 2020-01-23 PROCEDURE — 99213 OFFICE O/P EST LOW 20 MIN: CPT | Mod: S$GLB,,, | Performed by: INTERNAL MEDICINE

## 2020-01-23 RX ORDER — CIPROFLOXACIN 500 MG/1
500 TABLET ORAL 2 TIMES DAILY
Qty: 20 TABLET | Refills: 0 | Status: SHIPPED | OUTPATIENT
Start: 2020-01-23 | End: 2020-02-02

## 2020-01-23 NOTE — PROGRESS NOTES
Subjective:       Patient ID: Karla Washington is a 45 y.o. female.    Chief Complaint: Sore Throat    45 year old lady reports 12 days of cough and sore throat.  Now sputum and nasal mucus are varying shades of green and nasal mucus is blood streaked  No fever    Review of Systems   Constitutional: Negative for activity change, chills, fatigue and fever.   HENT: Negative for congestion, ear pain, nosebleeds, postnasal drip, sinus pressure and sore throat.    Eyes: Negative.  Negative for visual disturbance.   Respiratory: Negative for cough, chest tightness, shortness of breath and wheezing.    Cardiovascular: Negative for chest pain.   Gastrointestinal: Negative for abdominal pain, diarrhea, nausea and vomiting.   Genitourinary: Negative for difficulty urinating, dysuria, frequency and urgency.   Musculoskeletal: Negative for arthralgias and neck stiffness.   Skin: Negative for rash.   Neurological: Negative for dizziness, weakness and headaches.   Psychiatric/Behavioral: Negative for sleep disturbance. The patient is not nervous/anxious.        Objective:      Physical Exam   Constitutional: She is oriented to person, place, and time. She appears well-developed and well-nourished.  Non-toxic appearance. No distress.   HENT:   Head: Normocephalic and atraumatic.   Right Ear: Tympanic membrane, external ear and ear canal normal.   Left Ear: Tympanic membrane, external ear and ear canal normal.   Nose: Right sinus exhibits maxillary sinus tenderness and frontal sinus tenderness. Left sinus exhibits maxillary sinus tenderness and frontal sinus tenderness.   Mouth/Throat:       Eyes: Pupils are equal, round, and reactive to light. EOM are normal. No scleral icterus.   Neck: Normal range of motion. Neck supple. No thyromegaly present.   Cardiovascular: Normal rate, regular rhythm and normal heart sounds.   Pulmonary/Chest: Effort normal and breath sounds normal.   Abdominal: Soft. Bowel sounds are normal. She exhibits no  mass. There is no tenderness. There is no rebound.   Musculoskeletal: Normal range of motion.   Lymphadenopathy:     She has no cervical adenopathy.   Neurological: She is alert and oriented to person, place, and time. She has normal reflexes. She displays normal reflexes. No cranial nerve deficit. She exhibits normal muscle tone. Coordination normal.   Skin: Skin is warm and dry.   Psychiatric: She has a normal mood and affect. Her behavior is normal.       Assessment:       No diagnosis found.    Plan:   There are no diagnoses linked to this encounter.

## 2020-01-24 ENCOUNTER — LAB VISIT (OUTPATIENT)
Dept: LAB | Facility: HOSPITAL | Age: 45
End: 2020-01-24
Attending: PHYSICIAN ASSISTANT
Payer: COMMERCIAL

## 2020-01-24 DIAGNOSIS — Z91.89 AT HIGH RISK FOR BREAST CANCER: ICD-10-CM

## 2020-01-24 DIAGNOSIS — Z91.89 AT HIGH RISK FOR BREAST CANCER: Primary | ICD-10-CM

## 2020-01-24 PROCEDURE — 36415 COLL VENOUS BLD VENIPUNCTURE: CPT

## 2020-01-27 ENCOUNTER — HOSPITAL ENCOUNTER (OUTPATIENT)
Dept: RADIOLOGY | Facility: HOSPITAL | Age: 45
Discharge: HOME | End: 2020-01-27
Attending: PHYSICIAN ASSISTANT
Payer: COMMERCIAL

## 2020-01-27 ENCOUNTER — PATIENT MESSAGE (OUTPATIENT)
Dept: SURGERY | Facility: CLINIC | Age: 45
End: 2020-01-27

## 2020-01-27 DIAGNOSIS — Z91.89 AT HIGH RISK FOR BREAST CANCER: ICD-10-CM

## 2020-01-27 PROCEDURE — 77049 MRI BREAST C-+ W/CAD BI: CPT | Mod: TC

## 2020-01-27 PROCEDURE — 25500020 PHARM REV CODE 255: Performed by: PHYSICIAN ASSISTANT

## 2020-01-27 PROCEDURE — 77049 MRI BREAST C-+ W/CAD BI: CPT | Mod: 26,,, | Performed by: RADIOLOGY

## 2020-01-27 PROCEDURE — A9577 INJ MULTIHANCE: HCPCS | Performed by: PHYSICIAN ASSISTANT

## 2020-01-27 PROCEDURE — 77049 MRI BREAST W/WO CONTRAST, W/CAD, BILATERAL: ICD-10-PCS | Mod: 26,,, | Performed by: RADIOLOGY

## 2020-01-27 RX ADMIN — GADOBENATE DIMEGLUMINE 17 ML: 529 INJECTION, SOLUTION INTRAVENOUS at 04:01

## 2020-02-04 ENCOUNTER — PATIENT MESSAGE (OUTPATIENT)
Dept: SURGERY | Facility: CLINIC | Age: 45
End: 2020-02-04

## 2020-02-14 LAB — INTEGRATED BRAC ANALYSIS: NORMAL

## 2020-02-20 ENCOUNTER — TELEPHONE (OUTPATIENT)
Dept: SURGERY | Facility: CLINIC | Age: 45
End: 2020-02-20

## 2020-02-20 ENCOUNTER — PATIENT MESSAGE (OUTPATIENT)
Dept: SURGERY | Facility: CLINIC | Age: 45
End: 2020-02-20

## 2020-02-20 ENCOUNTER — DOCUMENTATION ONLY (OUTPATIENT)
Dept: SURGERY | Facility: CLINIC | Age: 45
End: 2020-02-20

## 2020-02-20 NOTE — PROGRESS NOTES
Her genetic test results through [facility] on 1/24/20 were NEGATIVE for any mutations.  I will mail her a copy of this report, message her about her results, and have a copy scanned into her chart.    ~Zohreh Gottlieb PA-C

## 2020-12-23 ENCOUNTER — OFFICE VISIT (OUTPATIENT)
Dept: URGENT CARE | Facility: CLINIC | Age: 45
End: 2020-12-23
Payer: COMMERCIAL

## 2020-12-23 VITALS
HEART RATE: 91 BPM | DIASTOLIC BLOOD PRESSURE: 76 MMHG | TEMPERATURE: 98 F | SYSTOLIC BLOOD PRESSURE: 128 MMHG | HEIGHT: 66 IN | RESPIRATION RATE: 16 BRPM | WEIGHT: 170 LBS | OXYGEN SATURATION: 97 % | BODY MASS INDEX: 27.32 KG/M2

## 2020-12-23 DIAGNOSIS — R53.83 FATIGUE, UNSPECIFIED TYPE: ICD-10-CM

## 2020-12-23 DIAGNOSIS — U07.1 COVID-19 VIRUS DETECTED: ICD-10-CM

## 2020-12-23 DIAGNOSIS — U07.1 COVID-19 VIRUS INFECTION: Primary | ICD-10-CM

## 2020-12-23 DIAGNOSIS — R09.81 SINUS CONGESTION: ICD-10-CM

## 2020-12-23 LAB
CTP QC/QA: YES
SARS-COV-2 RDRP RESP QL NAA+PROBE: POSITIVE

## 2020-12-23 PROCEDURE — 99214 OFFICE O/P EST MOD 30 MIN: CPT | Mod: 25,S$GLB,, | Performed by: NURSE PRACTITIONER

## 2020-12-23 PROCEDURE — 1126F PR PAIN SEVERITY QUANTIFIED, NO PAIN PRESENT: ICD-10-PCS | Mod: S$GLB,,, | Performed by: NURSE PRACTITIONER

## 2020-12-23 PROCEDURE — 3008F BODY MASS INDEX DOCD: CPT | Mod: CPTII,S$GLB,, | Performed by: NURSE PRACTITIONER

## 2020-12-23 PROCEDURE — U0002 COVID-19 LAB TEST NON-CDC: HCPCS | Mod: QW,S$GLB,, | Performed by: NURSE PRACTITIONER

## 2020-12-23 PROCEDURE — 3008F PR BODY MASS INDEX (BMI) DOCUMENTED: ICD-10-PCS | Mod: CPTII,S$GLB,, | Performed by: NURSE PRACTITIONER

## 2020-12-23 PROCEDURE — 1126F AMNT PAIN NOTED NONE PRSNT: CPT | Mod: S$GLB,,, | Performed by: NURSE PRACTITIONER

## 2020-12-23 PROCEDURE — 99214 PR OFFICE/OUTPT VISIT, EST, LEVL IV, 30-39 MIN: ICD-10-PCS | Mod: 25,S$GLB,, | Performed by: NURSE PRACTITIONER

## 2020-12-23 PROCEDURE — U0002: ICD-10-PCS | Mod: QW,S$GLB,, | Performed by: NURSE PRACTITIONER

## 2020-12-23 NOTE — PROGRESS NOTES
"Subjective:       Patient ID: Karla Washington is a 45 y.o. female.    Vitals:  height is 5' 6" (1.676 m) and weight is 77.1 kg (170 lb). Her temperature is 97.6 °F (36.4 °C). Her blood pressure is 128/76 and her pulse is 91. Her respiration is 16 and oxygen saturation is 97%.     Chief Complaint: URI    Muscle aches,dry cough, and sore throat since today.    URI   This is a new problem. The current episode started today. Associated symptoms include congestion and coughing. Pertinent negatives include no chest pain, diarrhea, dysuria, headaches, nausea, rash, sore throat or vomiting. She has tried nothing for the symptoms.       Constitution: Positive for fatigue. Negative for chills and fever.   HENT: Positive for congestion. Negative for sore throat.    Neck: Negative for painful lymph nodes.   Cardiovascular: Negative for chest pain and leg swelling.   Eyes: Negative.  Negative for double vision and blurred vision.   Respiratory: Positive for cough. Negative for shortness of breath.    Gastrointestinal: Negative for nausea, vomiting and diarrhea.   Genitourinary: Negative for dysuria, frequency, urgency and history of kidney stones.   Musculoskeletal: Negative.  Negative for joint pain, joint swelling, muscle cramps and muscle ache.   Skin: Negative for color change, pale, rash and bruising.   Allergic/Immunologic: Negative for seasonal allergies.   Neurological: Negative for dizziness, history of vertigo, light-headedness, passing out and headaches.   Hematologic/Lymphatic: Negative for swollen lymph nodes.   Psychiatric/Behavioral: Negative for nervous/anxious, sleep disturbance and depression. The patient is not nervous/anxious.        Objective:      Physical Exam   Constitutional: She is oriented to person, place, and time. She appears well-developed. She is cooperative.  Non-toxic appearance. She does not appear ill. No distress.   HENT:   Head: Normocephalic and atraumatic.   Ears:   Right Ear: Hearing, " tympanic membrane, external ear and ear canal normal.   Left Ear: Hearing, tympanic membrane, external ear and ear canal normal.   Nose: Nose normal. No mucosal edema, rhinorrhea or nasal deformity. No epistaxis. Right sinus exhibits no maxillary sinus tenderness and no frontal sinus tenderness. Left sinus exhibits no maxillary sinus tenderness and no frontal sinus tenderness.   Mouth/Throat: Uvula is midline, oropharynx is clear and moist and mucous membranes are normal. No trismus in the jaw. Normal dentition. No uvula swelling. No posterior oropharyngeal erythema.   PND      Comments: PND  Eyes: Conjunctivae and lids are normal. Right eye exhibits no discharge. Left eye exhibits no discharge. No scleral icterus.   Neck: Trachea normal, normal range of motion, full passive range of motion without pain and phonation normal. Neck supple.   Cardiovascular: Normal rate, regular rhythm, normal heart sounds and normal pulses.   Pulmonary/Chest: Effort normal and breath sounds normal. No respiratory distress.   Abdominal: Soft. Normal appearance and bowel sounds are normal. She exhibits no distension, no pulsatile midline mass and no mass. There is no abdominal tenderness.   Musculoskeletal: Normal range of motion.         General: No deformity.   Neurological: She is alert and oriented to person, place, and time. She exhibits normal muscle tone. Coordination normal.   Skin: Skin is warm, dry, intact, not diaphoretic and not pale. Psychiatric: Her speech is normal and behavior is normal. Judgment and thought content normal.   Nursing note and vitals reviewed.        Results for orders placed or performed in visit on 12/23/20   POCT COVID-19 Rapid Screening   Result Value Ref Range    POC Rapid COVID Positive (A) Negative     Acceptable Yes      Assessment:       1. COVID-19 virus infection    2. Fatigue, unspecified type    3. Sinus congestion        Plan:         COVID-19 virus infection  -     POCT  COVID-19 Rapid Screening    Fatigue, unspecified type    Sinus congestion      Patient Instructions   Increase fluid intake.     Take ibuprofen or tylenol for minor pain.     Must quarantine for 10 days from start of symptoms.     Follow up immediately if symptoms persist or worsen.     Your test was POSITIVE for COVID-19 (coronavirus).       Please isolate yourself at home.  You may leave home and/or return to work once the following conditions are met:    If you were not hospitalized and are not severely immunocompromised*:   More than 10 days since symptoms first appeared AND   More than 24 hours fever free without medications AND   Symptoms have improved     If you were hospitalized OR are severely immunocompromised*:   More than 20 days since symptoms first appeared   More than 24 hours fever free without medications   Symptoms have improved    If you had no symptoms but tested positive:   More than 10 days since the date of the first positive test (20 days if severely immunocompromised).   If you develop symptoms, then use the guidelines above.     *Definition of severely immunocompromised:  - Current chemotherapy for cancer  - Untreated HIV with CD4 count less than 200  - Combined primary immunodeficiency disorder  - Prednisone more than 20 mg per day for more than 14 days  - Post-transplant patients    Additional instructions:   Separate yourself from other people and animals in your home.   Call ahead before visiting your doctor.   Wear a facemask when around others.   Cover your coughs and sneezes.   Wash your hands often with soap and water; hand  can be used, too.   Avoid sharing personal household items.   Wipe down surfaces used daily.   Monitor your symptoms. Seek prompt medical attention if your illness is worsening (e.g., difficulty breathing).    Before seeking care, call your healthcare provider.   If you have a medical emergency and need to call 911, notify the dispatch  personnel that you have, or are being evaluated for COVID-19. If possible, put on a facemask before emergency medical services arrive.        Contact Tracing    As one of the next steps, you will receive a call or text from the [place] Department of Health ([facility]) COVID-19 Tracing Team. See the contact information below so you know not to ignore the health departments call. It is important that you contact them back immediately so they can help.      Contact Tracer Number:  [phone]  Caller ID for most carriers: [facility]     What is contact tracing?  · Contact tracing is a process that helps identify everyone who has been in close contact with an infected person. Contact tracers let those people know they may have been exposed and guide them on next steps. Confidentiality is important for everyone; no one will be told who may have exposed them to the virus.  · Your involvement is important. The more we know about where and how this virus is spreading, the better chance we have at stopping it from spreading further.  What does exposure mean?  · Exposure means you have been within 6 feet for more than 15 minutes with a person who has or had COVID-19.  What kind of questions do the contact tracers ask?  · A contact tracer will confirm your basic contact information including name, address, phone number, and next of kin, as well as asking about any symptoms you may have had. Theyll also ask you how you think you may have gotten sick, such as places where you may have been exposed to the virus, and people you were with. Those names will never be shared with anyone outside of that call, and will only be used to help trace and stop the spread of the virus.   I have privacy concerns. How will the state use my information?  · Your privacy about your health is important. All calls are completed using call centers that use the appropriate health privacy protection measures (HIPAA compliance), meaning that your  patient information is safe. No one will ever ask you any questions related to immigration status. Your health comes first.   Do I have to participate?  · You do not have to participate, but we strongly encourage you to. Contact tracing can help us catch and control new outbreaks as theyre developing to keep your friends and family safe.   What if I dont hear from anyone?  · If you dont receive a call within 24 hours, you can call the number above right away to inquire about your status. That line is open from 8:00 am - 8:00 p.m., 7 days a week.  Contact tracing saves lives! Together, we have the power to beat this virus and keep our loved ones and neighbors safe.    For more information see CDC link below.      https://www.cdc.gov/coronavirus/2019-ncov/hcp/guidance-prevent-spread.html#precautions        Sources:  [facility], [place] [facility] and [facility]           Sincerely,     Bisi Freitas NP

## 2020-12-23 NOTE — PATIENT INSTRUCTIONS
Increase fluid intake.     Take ibuprofen or tylenol for minor pain.     Must quarantine for 10 days from start of symptoms.     Follow up immediately if symptoms persist or worsen.     Your test was POSITIVE for COVID-19 (coronavirus).       Please isolate yourself at home.  You may leave home and/or return to work once the following conditions are met:    If you were not hospitalized and are not severely immunocompromised*:   More than 10 days since symptoms first appeared AND   More than 24 hours fever free without medications AND   Symptoms have improved     If you were hospitalized OR are severely immunocompromised*:   More than 20 days since symptoms first appeared   More than 24 hours fever free without medications   Symptoms have improved    If you had no symptoms but tested positive:   More than 10 days since the date of the first positive test (20 days if severely immunocompromised).   If you develop symptoms, then use the guidelines above.     *Definition of severely immunocompromised:  - Current chemotherapy for cancer  - Untreated HIV with CD4 count less than 200  - Combined primary immunodeficiency disorder  - Prednisone more than 20 mg per day for more than 14 days  - Post-transplant patients    Additional instructions:   Separate yourself from other people and animals in your home.   Call ahead before visiting your doctor.   Wear a facemask when around others.   Cover your coughs and sneezes.   Wash your hands often with soap and water; hand  can be used, too.   Avoid sharing personal household items.   Wipe down surfaces used daily.   Monitor your symptoms. Seek prompt medical attention if your illness is worsening (e.g., difficulty breathing).    Before seeking care, call your healthcare provider.   If you have a medical emergency and need to call 911, notify the dispatch personnel that you have, or are being evaluated for COVID-19. If possible, put on a facemask before  emergency medical services arrive.        Contact Tracing    As one of the next steps, you will receive a call or text from the [place] Department of Health ([facility]) COVID-19 Tracing Team. See the contact information below so you know not to ignore the health departments call. It is important that you contact them back immediately so they can help.      Contact Tracer Number:  [phone]  Caller ID for most carriers: [place] Dept Health     What is contact tracing?  · Contact tracing is a process that helps identify everyone who has been in close contact with an infected person. Contact tracers let those people know they may have been exposed and guide them on next steps. Confidentiality is important for everyone; no one will be told who may have exposed them to the virus.  · Your involvement is important. The more we know about where and how this virus is spreading, the better chance we have at stopping it from spreading further.  What does exposure mean?  · Exposure means you have been within 6 feet for more than 15 minutes with a person who has or had COVID-19.  What kind of questions do the contact tracers ask?  · A contact tracer will confirm your basic contact information including name, address, phone number, and next of kin, as well as asking about any symptoms you may have had. Theyll also ask you how you think you may have gotten sick, such as places where you may have been exposed to the virus, and people you were with. Those names will never be shared with anyone outside of that call, and will only be used to help trace and stop the spread of the virus.   I have privacy concerns. How will the state use my information?  · Your privacy about your health is important. All calls are completed using call centers that use the appropriate health privacy protection measures (HIPAA compliance), meaning that your patient information is safe. No one will ever ask you any questions related to immigration status.  Your health comes first.   Do I have to participate?  · You do not have to participate, but we strongly encourage you to. Contact tracing can help us catch and control new outbreaks as theyre developing to keep your friends and family safe.   What if I dont hear from anyone?  · If you dont receive a call within 24 hours, you can call the number above right away to inquire about your status. That line is open from 8:00 am - 8:00 p.m., 7 days a week.  Contact tracing saves lives! Together, we have the power to beat this virus and keep our loved ones and neighbors safe.    For more information see CDC link below.      https://www.cdc.gov/coronavirus/2019-ncov/hcp/guidance-prevent-spread.html#precautions        Sources:  [facility], [place] [facility] and [facility]           Sincerely,     Bisi Freitas NP

## 2020-12-24 ENCOUNTER — NURSE TRIAGE (OUTPATIENT)
Dept: ADMINISTRATIVE | Facility: CLINIC | Age: 45
End: 2020-12-24

## 2020-12-24 NOTE — TELEPHONE ENCOUNTER
Pt is part of the COVID-19 home symptom monitoring program. Reports new onset cough and rash. Per protocol, home care advice given.    Reason for Disposition   [1] COVID-19 diagnosed by positive lab test AND [2] mild symptoms (e.g., cough, fever, others) AND [3] no complications or SOB    Additional Information   Negative: SEVERE difficulty breathing (e.g., struggling for each breath, speaks in single words)   Negative: Difficult to awaken or acting confused (e.g., disoriented, slurred speech)   Negative: Bluish (or gray) lips or face now   Negative: Shock suspected (e.g., cold/pale/clammy skin, too weak to stand, low BP, rapid pulse)   Negative: Sounds like a life-threatening emergency to the triager   Negative: SEVERE or constant chest pain or pressure (Exception: mild central chest pain, present only when coughing)   Negative: MODERATE difficulty breathing (e.g., speaks in phrases, SOB even at rest, pulse 100-120)   Negative: [1] Headache AND [2] stiff neck (can't touch chin to chest)   Negative: MILD difficulty breathing (e.g., minimal/no SOB at rest, SOB with walking, pulse <100)   Negative: Chest pain or pressure   Negative: Patient sounds very sick or weak to the triager   Negative: Fever > 103 F (39.4 C)   Negative: [1] Fever > 101 F (38.3 C) AND [2] age > 60   Negative: [1] Fever > 100.0 F (37.8 C) AND [2] bedridden (e.g., nursing home patient, CVA, chronic illness, recovering from surgery)   Negative: [1] HIGH RISK patient (e.g., age > 64 years, diabetes, heart or lung disease, weak immune system) AND [2] new or worsening symptoms   Negative: [1] HIGH RISK patient AND [2] influenza is widespread in the community AND [3] ONE OR MORE respiratory symptoms: cough, sore throat, runny or stuffy nose   Negative: [1] HIGH RISK patient AND [2] influenza exposure within the last 7 days AND [3] ONE OR MORE respiratory symptoms: cough, sore throat, runny or stuffy nose   Negative: Fever present > 3  days (72 hours)   Negative: [1] Fever returns after gone for over 24 hours AND [2] symptoms worse or not improved   Negative: [1] Continuous (nonstop) coughing interferes with work or school AND [2] no improvement using cough treatment per protocol   Negative: [1] COVID-19 infection suspected by caller or triager AND [2] mild symptoms (cough, fever, or others) AND [3] no complications or SOB   Negative: Cough present > 3 weeks    Protocols used: CORONAVIRUS (COVID-19) DIAGNOSED OR [id number]

## 2020-12-28 ENCOUNTER — TELEPHONE (OUTPATIENT)
Dept: INTERNAL MEDICINE | Facility: CLINIC | Age: 45
End: 2020-12-28

## 2020-12-29 ENCOUNTER — NURSE TRIAGE (OUTPATIENT)
Dept: ADMINISTRATIVE | Facility: CLINIC | Age: 45
End: 2020-12-29

## 2020-12-29 NOTE — TELEPHONE ENCOUNTER
Patient reports moderate productive cough, fatigue and mild dizziness.  SPO2 97-98%    Reason for Disposition   [1] COVID-19 diagnosed by positive lab test AND [2] mild symptoms (e.g., cough, fever, others) AND [3] no complications or SOB    Additional Information   Negative: Severe difficulty breathing (e.g., struggling for each breath, speaks in single words)   Negative: Difficult to awaken or acting confused (e.g., disoriented, slurred speech)   Negative: Bluish (or gray) lips or face now   Negative: Shock suspected (e.g., cold/pale/clammy skin, too weak to stand, low BP, rapid pulse)   Negative: Sounds like a life-threatening emergency to the triager   Negative: SEVERE or constant chest pain or pressure (Exception: mild central chest pain, present only when coughing)   Negative: MODERATE difficulty breathing (e.g., speaks in phrases, SOB even at rest, pulse 100-120)   Negative: MILD difficulty breathing (e.g., minimal/no SOB at rest, SOB with walking, pulse <100)   Negative: Chest pain   Negative: Patient sounds very sick or weak to the triager   Negative: Fever > 103 F (39.4 C)   Negative: [1] Fever > 101 F (38.3 C) AND [2] age > 60   Negative: [1] Fever > 100.0 F (37.8 C) AND [2] bedridden (e.g., nursing home patient, CVA, chronic illness, recovering from surgery)   Negative: HIGH RISK patient (e.g., age > 64 years, diabetes, heart or lung disease, weak immune system) (Exception: has already been evaluated by healthcare provider and has no new or worsening symptoms)   Negative: [1] COVID-19 infection suspected by caller or triager AND [2] mild symptoms (cough, fever, or others) AND [3] no complications or SOB   Negative: Fever present > 3 days (72 hours)   Negative: [1] Fever returns after gone for over 24 hours AND [2] symptoms worse or not improved   Negative: [1] Continuous (nonstop) coughing interferes with work or school AND [2] no improvement using cough treatment per protocol    Negative: Cough present > 3 weeks    Protocols used: CORONAVIRUS (COVID-19) DIAGNOSED OR [id number]

## 2020-12-31 ENCOUNTER — NURSE TRIAGE (OUTPATIENT)
Dept: ADMINISTRATIVE | Facility: CLINIC | Age: 45
End: 2020-12-31

## 2020-12-31 NOTE — TELEPHONE ENCOUNTER
Pt is part of the COVID-19 home symptom monitoring program. Reports no new or worsening symptoms. Declines triage questions.    Reason for Disposition   Information only question and nurse able to answer    Additional Information   Negative: Nursing judgment   Negative: Nursing judgment   Negative: Nursing judgment   Negative: Nursing judgment    Protocols used: INFORMATION ONLY CALL - NO TRIAGE-A-OH

## 2021-01-06 ENCOUNTER — NURSE TRIAGE (OUTPATIENT)
Dept: ADMINISTRATIVE | Facility: CLINIC | Age: 46
End: 2021-01-06

## 2021-01-11 ENCOUNTER — HOSPITAL ENCOUNTER (OUTPATIENT)
Dept: RADIOLOGY | Facility: HOSPITAL | Age: 46
Discharge: HOME | End: 2021-01-11
Attending: ORTHOPAEDIC SURGERY
Payer: COMMERCIAL

## 2021-01-11 ENCOUNTER — OFFICE VISIT (OUTPATIENT)
Dept: ORTHOPEDICS | Facility: CLINIC | Age: 46
End: 2021-01-11
Payer: COMMERCIAL

## 2021-01-11 DIAGNOSIS — M89.9 OSTEOCHONDRAL LESION OF TALAR DOME: ICD-10-CM

## 2021-01-11 DIAGNOSIS — R52 PAIN: ICD-10-CM

## 2021-01-11 DIAGNOSIS — M94.9 OSTEOCHONDRAL LESION OF TALAR DOME: ICD-10-CM

## 2021-01-11 DIAGNOSIS — S93.402S MODERATE LEFT ANKLE SPRAIN, SEQUELA: ICD-10-CM

## 2021-01-11 DIAGNOSIS — M25.572 PAIN OF JOINT OF LEFT ANKLE AND FOOT: ICD-10-CM

## 2021-01-11 DIAGNOSIS — R52 PAIN: Primary | ICD-10-CM

## 2021-01-11 PROBLEM — S93.401A MODERATE RIGHT ANKLE SPRAIN: Status: ACTIVE | Noted: 2021-01-11

## 2021-01-11 PROCEDURE — 99999 PR PBB SHADOW E&M-EST. PATIENT-LVL II: ICD-10-PCS | Mod: PBBFAC,,, | Performed by: ORTHOPAEDIC SURGERY

## 2021-01-11 PROCEDURE — 73610 X-RAY EXAM OF ANKLE: CPT | Mod: 26,LT,, | Performed by: RADIOLOGY

## 2021-01-11 PROCEDURE — 73610 XR ANKLE COMPLETE 3 VIEW LEFT: ICD-10-PCS | Mod: 26,LT,, | Performed by: RADIOLOGY

## 2021-01-11 PROCEDURE — 73610 X-RAY EXAM OF ANKLE: CPT | Mod: TC,LT

## 2021-01-11 PROCEDURE — 99999 PR PBB SHADOW E&M-EST. PATIENT-LVL II: CPT | Mod: PBBFAC,,, | Performed by: ORTHOPAEDIC SURGERY

## 2021-01-11 PROCEDURE — 99203 OFFICE O/P NEW LOW 30 MIN: CPT | Mod: S$GLB,,, | Performed by: ORTHOPAEDIC SURGERY

## 2021-01-11 PROCEDURE — 99203 PR OFFICE/OUTPT VISIT, NEW, LEVL III, 30-44 MIN: ICD-10-PCS | Mod: S$GLB,,, | Performed by: ORTHOPAEDIC SURGERY

## 2021-01-15 ENCOUNTER — HOSPITAL ENCOUNTER (OUTPATIENT)
Dept: RADIOLOGY | Facility: HOSPITAL | Age: 46
Discharge: HOME | End: 2021-01-15
Attending: ORTHOPAEDIC SURGERY
Payer: COMMERCIAL

## 2021-01-15 ENCOUNTER — PATIENT MESSAGE (OUTPATIENT)
Dept: ORTHOPEDICS | Facility: CLINIC | Age: 46
End: 2021-01-15

## 2021-01-15 DIAGNOSIS — M25.572 PAIN OF JOINT OF LEFT ANKLE AND FOOT: ICD-10-CM

## 2021-01-15 DIAGNOSIS — S93.402S MODERATE LEFT ANKLE SPRAIN, SEQUELA: ICD-10-CM

## 2021-01-15 DIAGNOSIS — M94.9 OSTEOCHONDRAL LESION OF TALAR DOME: ICD-10-CM

## 2021-01-15 DIAGNOSIS — M89.9 OSTEOCHONDRAL LESION OF TALAR DOME: ICD-10-CM

## 2021-01-15 PROCEDURE — 73721 MRI JNT OF LWR EXTRE W/O DYE: CPT | Mod: TC,LT

## 2021-01-15 PROCEDURE — 73721 MRI ANKLE WITHOUT CONTRAST LEFT: ICD-10-PCS | Mod: 26,LT,, | Performed by: RADIOLOGY

## 2021-01-15 PROCEDURE — 73721 MRI JNT OF LWR EXTRE W/O DYE: CPT | Mod: 26,LT,, | Performed by: RADIOLOGY

## 2021-03-09 ENCOUNTER — TELEPHONE (OUTPATIENT)
Dept: SURGERY | Facility: CLINIC | Age: 46
End: 2021-03-09

## 2021-03-11 ENCOUNTER — OFFICE VISIT (OUTPATIENT)
Dept: HEMATOLOGY/ONCOLOGY | Facility: CLINIC | Age: 46
End: 2021-03-11
Payer: COMMERCIAL

## 2021-03-11 VITALS
WEIGHT: 178.81 LBS | HEIGHT: 66 IN | BODY MASS INDEX: 28.74 KG/M2 | SYSTOLIC BLOOD PRESSURE: 110 MMHG | HEART RATE: 80 BPM | DIASTOLIC BLOOD PRESSURE: 69 MMHG

## 2021-03-11 DIAGNOSIS — Z12.39 BREAST CANCER SCREENING, HIGH RISK PATIENT: ICD-10-CM

## 2021-03-11 DIAGNOSIS — Z80.3 FAMILY HISTORY OF BREAST CANCER: ICD-10-CM

## 2021-03-11 DIAGNOSIS — Z12.31 SCREENING MAMMOGRAM, ENCOUNTER FOR: Primary | ICD-10-CM

## 2021-03-11 DIAGNOSIS — Z91.89 INCREASED RISK OF BREAST CANCER: ICD-10-CM

## 2021-03-11 PROCEDURE — 99214 OFFICE O/P EST MOD 30 MIN: CPT | Mod: S$GLB,,, | Performed by: PHYSICIAN ASSISTANT

## 2021-03-11 PROCEDURE — 99999 PR PBB SHADOW E&M-EST. PATIENT-LVL III: ICD-10-PCS | Mod: PBBFAC,,, | Performed by: PHYSICIAN ASSISTANT

## 2021-03-11 PROCEDURE — 1126F PR PAIN SEVERITY QUANTIFIED, NO PAIN PRESENT: ICD-10-PCS | Mod: S$GLB,,, | Performed by: PHYSICIAN ASSISTANT

## 2021-03-11 PROCEDURE — 99999 PR PBB SHADOW E&M-EST. PATIENT-LVL III: CPT | Mod: PBBFAC,,, | Performed by: PHYSICIAN ASSISTANT

## 2021-03-11 PROCEDURE — 1126F AMNT PAIN NOTED NONE PRSNT: CPT | Mod: S$GLB,,, | Performed by: PHYSICIAN ASSISTANT

## 2021-03-11 PROCEDURE — 3008F BODY MASS INDEX DOCD: CPT | Mod: CPTII,S$GLB,, | Performed by: PHYSICIAN ASSISTANT

## 2021-03-11 PROCEDURE — 99214 PR OFFICE/OUTPT VISIT, EST, LEVL IV, 30-39 MIN: ICD-10-PCS | Mod: S$GLB,,, | Performed by: PHYSICIAN ASSISTANT

## 2021-03-11 PROCEDURE — 3008F PR BODY MASS INDEX (BMI) DOCUMENTED: ICD-10-PCS | Mod: CPTII,S$GLB,, | Performed by: PHYSICIAN ASSISTANT

## 2021-03-16 ENCOUNTER — HOSPITAL ENCOUNTER (OUTPATIENT)
Dept: RADIOLOGY | Facility: HOSPITAL | Age: 46
Discharge: HOME | End: 2021-03-16
Attending: PHYSICIAN ASSISTANT
Payer: COMMERCIAL

## 2021-03-16 DIAGNOSIS — Z12.31 SCREENING MAMMOGRAM, ENCOUNTER FOR: ICD-10-CM

## 2021-03-16 PROCEDURE — 77063 MAMMO DIGITAL SCREENING BILAT WITH TOMO: ICD-10-PCS | Mod: 26,,, | Performed by: RADIOLOGY

## 2021-03-16 PROCEDURE — 77063 BREAST TOMOSYNTHESIS BI: CPT | Mod: 26,,, | Performed by: RADIOLOGY

## 2021-03-16 PROCEDURE — 77067 MAMMO DIGITAL SCREENING BILAT WITH TOMO: ICD-10-PCS | Mod: 26,,, | Performed by: RADIOLOGY

## 2021-03-16 PROCEDURE — 77067 SCR MAMMO BI INCL CAD: CPT | Mod: TC

## 2021-03-16 PROCEDURE — 77067 SCR MAMMO BI INCL CAD: CPT | Mod: 26,,, | Performed by: RADIOLOGY

## 2021-04-05 ENCOUNTER — PATIENT MESSAGE (OUTPATIENT)
Dept: ADMINISTRATIVE | Facility: HOSPITAL | Age: 46
End: 2021-04-05

## 2021-04-16 ENCOUNTER — PATIENT MESSAGE (OUTPATIENT)
Dept: RESEARCH | Facility: HOSPITAL | Age: 46
End: 2021-04-16

## 2021-05-20 ENCOUNTER — OFFICE VISIT (OUTPATIENT)
Dept: OBSTETRICS AND GYNECOLOGY | Facility: CLINIC | Age: 46
End: 2021-05-20
Attending: OBSTETRICS & GYNECOLOGY
Payer: COMMERCIAL

## 2021-05-20 VITALS
WEIGHT: 173.06 LBS | SYSTOLIC BLOOD PRESSURE: 120 MMHG | BODY MASS INDEX: 27.93 KG/M2 | DIASTOLIC BLOOD PRESSURE: 82 MMHG

## 2021-05-20 DIAGNOSIS — N95.1 PERIMENOPAUSAL: ICD-10-CM

## 2021-05-20 DIAGNOSIS — Z12.4 PAP SMEAR FOR CERVICAL CANCER SCREENING: ICD-10-CM

## 2021-05-20 DIAGNOSIS — Z01.419 WOMEN'S ANNUAL ROUTINE GYNECOLOGICAL EXAMINATION: Primary | ICD-10-CM

## 2021-05-20 PROCEDURE — 99999 PR PBB SHADOW E&M-EST. PATIENT-LVL III: CPT | Mod: PBBFAC,,, | Performed by: OBSTETRICS & GYNECOLOGY

## 2021-05-20 PROCEDURE — 3008F PR BODY MASS INDEX (BMI) DOCUMENTED: ICD-10-PCS | Mod: CPTII,S$GLB,, | Performed by: OBSTETRICS & GYNECOLOGY

## 2021-05-20 PROCEDURE — 1125F AMNT PAIN NOTED PAIN PRSNT: CPT | Mod: S$GLB,,, | Performed by: OBSTETRICS & GYNECOLOGY

## 2021-05-20 PROCEDURE — 87624 HPV HI-RISK TYP POOLED RSLT: CPT | Performed by: OBSTETRICS & GYNECOLOGY

## 2021-05-20 PROCEDURE — 88175 CYTOPATH C/V AUTO FLUID REDO: CPT | Performed by: OBSTETRICS & GYNECOLOGY

## 2021-05-20 PROCEDURE — 99999 PR PBB SHADOW E&M-EST. PATIENT-LVL III: ICD-10-PCS | Mod: PBBFAC,,, | Performed by: OBSTETRICS & GYNECOLOGY

## 2021-05-20 PROCEDURE — 3008F BODY MASS INDEX DOCD: CPT | Mod: CPTII,S$GLB,, | Performed by: OBSTETRICS & GYNECOLOGY

## 2021-05-20 PROCEDURE — 1125F PR PAIN SEVERITY QUANTIFIED, PAIN PRESENT: ICD-10-PCS | Mod: S$GLB,,, | Performed by: OBSTETRICS & GYNECOLOGY

## 2021-05-20 PROCEDURE — 99396 PREV VISIT EST AGE 40-64: CPT | Mod: S$GLB,,, | Performed by: OBSTETRICS & GYNECOLOGY

## 2021-05-20 PROCEDURE — 99396 PR PREVENTIVE VISIT,EST,40-64: ICD-10-PCS | Mod: S$GLB,,, | Performed by: OBSTETRICS & GYNECOLOGY

## 2021-05-20 RX ORDER — ACETAMINOPHEN 500 MG
TABLET ORAL
COMMUNITY
Start: 2021-03-17 | End: 2021-10-05

## 2021-05-27 ENCOUNTER — PATIENT MESSAGE (OUTPATIENT)
Dept: OBSTETRICS AND GYNECOLOGY | Facility: CLINIC | Age: 46
End: 2021-05-27

## 2021-05-27 LAB
FINAL PATHOLOGIC DIAGNOSIS: NORMAL
HPV HR 12 DNA SPEC QL NAA+PROBE: NEGATIVE
HPV16 AG SPEC QL: NEGATIVE
HPV18 DNA SPEC QL NAA+PROBE: NEGATIVE
Lab: NORMAL

## 2021-08-11 ENCOUNTER — OFFICE VISIT (OUTPATIENT)
Dept: URGENT CARE | Facility: CLINIC | Age: 46
End: 2021-08-11
Payer: COMMERCIAL

## 2021-08-11 VITALS
HEART RATE: 93 BPM | SYSTOLIC BLOOD PRESSURE: 131 MMHG | TEMPERATURE: 98 F | WEIGHT: 173 LBS | HEIGHT: 66 IN | BODY MASS INDEX: 27.8 KG/M2 | DIASTOLIC BLOOD PRESSURE: 73 MMHG | OXYGEN SATURATION: 98 % | RESPIRATION RATE: 16 BRPM

## 2021-08-11 DIAGNOSIS — J06.9 VIRAL URI WITH COUGH: Primary | ICD-10-CM

## 2021-08-11 LAB
CTP QC/QA: YES
SARS-COV-2 RDRP RESP QL NAA+PROBE: NEGATIVE

## 2021-08-11 PROCEDURE — U0002: ICD-10-PCS | Mod: QW,S$GLB,, | Performed by: PHYSICIAN ASSISTANT

## 2021-08-11 PROCEDURE — 99213 PR OFFICE/OUTPT VISIT, EST, LEVL III, 20-29 MIN: ICD-10-PCS | Mod: S$GLB,,, | Performed by: PHYSICIAN ASSISTANT

## 2021-08-11 PROCEDURE — 1159F PR MEDICATION LIST DOCUMENTED IN MEDICAL RECORD: ICD-10-PCS | Mod: CPTII,S$GLB,, | Performed by: PHYSICIAN ASSISTANT

## 2021-08-11 PROCEDURE — 3008F PR BODY MASS INDEX (BMI) DOCUMENTED: ICD-10-PCS | Mod: CPTII,S$GLB,, | Performed by: PHYSICIAN ASSISTANT

## 2021-08-11 PROCEDURE — U0002 COVID-19 LAB TEST NON-CDC: HCPCS | Mod: QW,S$GLB,, | Performed by: PHYSICIAN ASSISTANT

## 2021-08-11 PROCEDURE — 99213 OFFICE O/P EST LOW 20 MIN: CPT | Mod: S$GLB,,, | Performed by: PHYSICIAN ASSISTANT

## 2021-08-11 PROCEDURE — 3075F SYST BP GE 130 - 139MM HG: CPT | Mod: CPTII,S$GLB,, | Performed by: PHYSICIAN ASSISTANT

## 2021-08-11 PROCEDURE — 1159F MED LIST DOCD IN RCRD: CPT | Mod: CPTII,S$GLB,, | Performed by: PHYSICIAN ASSISTANT

## 2021-08-11 PROCEDURE — 3075F PR MOST RECENT SYSTOLIC BLOOD PRESS GE 130-139MM HG: ICD-10-PCS | Mod: CPTII,S$GLB,, | Performed by: PHYSICIAN ASSISTANT

## 2021-08-11 PROCEDURE — 1160F PR REVIEW ALL MEDS BY PRESCRIBER/CLIN PHARMACIST DOCUMENTED: ICD-10-PCS | Mod: CPTII,S$GLB,, | Performed by: PHYSICIAN ASSISTANT

## 2021-08-11 PROCEDURE — 1160F RVW MEDS BY RX/DR IN RCRD: CPT | Mod: CPTII,S$GLB,, | Performed by: PHYSICIAN ASSISTANT

## 2021-08-11 PROCEDURE — 3078F DIAST BP <80 MM HG: CPT | Mod: CPTII,S$GLB,, | Performed by: PHYSICIAN ASSISTANT

## 2021-08-11 PROCEDURE — 3078F PR MOST RECENT DIASTOLIC BLOOD PRESSURE < 80 MM HG: ICD-10-PCS | Mod: CPTII,S$GLB,, | Performed by: PHYSICIAN ASSISTANT

## 2021-08-11 PROCEDURE — 3008F BODY MASS INDEX DOCD: CPT | Mod: CPTII,S$GLB,, | Performed by: PHYSICIAN ASSISTANT

## 2021-10-05 ENCOUNTER — OFFICE VISIT (OUTPATIENT)
Dept: PRIMARY CARE CLINIC | Facility: CLINIC | Age: 46
End: 2021-10-05
Attending: FAMILY MEDICINE
Payer: COMMERCIAL

## 2021-10-05 VITALS
HEART RATE: 72 BPM | OXYGEN SATURATION: 98 % | WEIGHT: 177 LBS | DIASTOLIC BLOOD PRESSURE: 68 MMHG | SYSTOLIC BLOOD PRESSURE: 118 MMHG | HEIGHT: 66 IN | BODY MASS INDEX: 28.45 KG/M2

## 2021-10-05 DIAGNOSIS — L01.00 IMPETIGO: ICD-10-CM

## 2021-10-05 DIAGNOSIS — J34.0 CELLULITIS OF NOSE, EXTERNAL: Primary | ICD-10-CM

## 2021-10-05 PROCEDURE — 1159F MED LIST DOCD IN RCRD: CPT | Mod: CPTII,S$GLB,, | Performed by: FAMILY MEDICINE

## 2021-10-05 PROCEDURE — 1159F PR MEDICATION LIST DOCUMENTED IN MEDICAL RECORD: ICD-10-PCS | Mod: CPTII,S$GLB,, | Performed by: FAMILY MEDICINE

## 2021-10-05 PROCEDURE — 99999 PR PBB SHADOW E&M-EST. PATIENT-LVL III: CPT | Mod: PBBFAC,,, | Performed by: FAMILY MEDICINE

## 2021-10-05 PROCEDURE — 1160F PR REVIEW ALL MEDS BY PRESCRIBER/CLIN PHARMACIST DOCUMENTED: ICD-10-PCS | Mod: CPTII,S$GLB,, | Performed by: FAMILY MEDICINE

## 2021-10-05 PROCEDURE — 99213 OFFICE O/P EST LOW 20 MIN: CPT | Mod: S$GLB,,, | Performed by: FAMILY MEDICINE

## 2021-10-05 PROCEDURE — 99213 PR OFFICE/OUTPT VISIT, EST, LEVL III, 20-29 MIN: ICD-10-PCS | Mod: S$GLB,,, | Performed by: FAMILY MEDICINE

## 2021-10-05 PROCEDURE — 1160F RVW MEDS BY RX/DR IN RCRD: CPT | Mod: CPTII,S$GLB,, | Performed by: FAMILY MEDICINE

## 2021-10-05 PROCEDURE — 3074F PR MOST RECENT SYSTOLIC BLOOD PRESSURE < 130 MM HG: ICD-10-PCS | Mod: CPTII,S$GLB,, | Performed by: FAMILY MEDICINE

## 2021-10-05 PROCEDURE — 3078F PR MOST RECENT DIASTOLIC BLOOD PRESSURE < 80 MM HG: ICD-10-PCS | Mod: CPTII,S$GLB,, | Performed by: FAMILY MEDICINE

## 2021-10-05 PROCEDURE — 3074F SYST BP LT 130 MM HG: CPT | Mod: CPTII,S$GLB,, | Performed by: FAMILY MEDICINE

## 2021-10-05 PROCEDURE — 99999 PR PBB SHADOW E&M-EST. PATIENT-LVL III: ICD-10-PCS | Mod: PBBFAC,,, | Performed by: FAMILY MEDICINE

## 2021-10-05 PROCEDURE — 3008F BODY MASS INDEX DOCD: CPT | Mod: CPTII,S$GLB,, | Performed by: FAMILY MEDICINE

## 2021-10-05 PROCEDURE — 3078F DIAST BP <80 MM HG: CPT | Mod: CPTII,S$GLB,, | Performed by: FAMILY MEDICINE

## 2021-10-05 PROCEDURE — 3008F PR BODY MASS INDEX (BMI) DOCUMENTED: ICD-10-PCS | Mod: CPTII,S$GLB,, | Performed by: FAMILY MEDICINE

## 2021-10-05 RX ORDER — MUPIROCIN 20 MG/G
OINTMENT TOPICAL 3 TIMES DAILY
Qty: 22 G | Refills: 0 | Status: SHIPPED | OUTPATIENT
Start: 2021-10-05 | End: 2021-10-15

## 2021-10-05 RX ORDER — SULFAMETHOXAZOLE AND TRIMETHOPRIM 800; 160 MG/1; MG/1
1 TABLET ORAL 2 TIMES DAILY
Qty: 20 TABLET | Refills: 0 | Status: SHIPPED | OUTPATIENT
Start: 2021-10-05 | End: 2021-10-15

## 2021-12-28 ENCOUNTER — HOSPITAL ENCOUNTER (OUTPATIENT)
Dept: RADIOLOGY | Facility: HOSPITAL | Age: 46
Discharge: HOME | End: 2021-12-28
Attending: PHYSICIAN ASSISTANT
Payer: COMMERCIAL

## 2021-12-28 DIAGNOSIS — Z91.89 INCREASED RISK OF BREAST CANCER: ICD-10-CM

## 2021-12-28 DIAGNOSIS — Z12.39 BREAST CANCER SCREENING, HIGH RISK PATIENT: ICD-10-CM

## 2021-12-28 DIAGNOSIS — Z80.3 FAMILY HISTORY OF BREAST CANCER: ICD-10-CM

## 2021-12-28 PROCEDURE — 25500020 PHARM REV CODE 255: Performed by: PHYSICIAN ASSISTANT

## 2021-12-28 PROCEDURE — 77049 MRI BREAST W/WO CONTRAST, W/CAD, BILATERAL: ICD-10-PCS | Mod: 26,,, | Performed by: RADIOLOGY

## 2021-12-28 PROCEDURE — 77049 MRI BREAST C-+ W/CAD BI: CPT | Mod: 26,,, | Performed by: RADIOLOGY

## 2021-12-28 PROCEDURE — A9577 INJ MULTIHANCE: HCPCS | Performed by: PHYSICIAN ASSISTANT

## 2021-12-28 PROCEDURE — 77049 MRI BREAST C-+ W/CAD BI: CPT | Mod: TC

## 2021-12-28 RX ADMIN — GADOBENATE DIMEGLUMINE 17 ML: 529 INJECTION, SOLUTION INTRAVENOUS at 02:12

## 2022-02-22 ENCOUNTER — TELEPHONE (OUTPATIENT)
Dept: PRIMARY CARE CLINIC | Facility: CLINIC | Age: 47
End: 2022-02-22
Payer: COMMERCIAL

## 2022-02-22 NOTE — TELEPHONE ENCOUNTER
"Called pt to inform her that Dr. Gerard recommends that she follows up with her PCP for her annual. Pt states that she does not want to see Dr. Gomez anymore and was trying to switch to Dr. Gerard. Informed pt that Dr. Gerard is not accepting new patients at this time and will be unable to see her as a new patient. I apologized to the pt multiple times. Pt became defensive and states that she wishes we would have informed her several months back. Informed pt that we where checking the schedule today an noticed that she was schedule incorrectly. Offered to schedule pt with the providers who are accepting new patients. Pt declined the appointments and stated that she wanted to be seen tomorrow at 8 Am. Informed pt that I can schedule her with Dr. Gomez if she would like. Pt declined appt and states that she only wants a female provider that she "likes" and then stated "So frustrated and I really wish I was not a [facility]." pt hung up the phone.    "

## 2022-02-23 NOTE — TELEPHONE ENCOUNTER
----- Message from Danya Mansfield sent at 2/23/2022  9:16 AM CST -----  Regarding: LOV 2018  Message    Appointment Request From: Karla Washington    With Provider: Geovani Gomez MD [[facility]]    Preferred Date Range: 4/25/2022 - 4/29/2022    Preferred Times: Monday Morning, Monday Afternoon, Wednesday Afternoon, Friday Morning, Friday Afternoon    Reason for visit: Annual Check-up    Comments:  Annual check up

## 2022-02-25 ENCOUNTER — PATIENT MESSAGE (OUTPATIENT)
Dept: OPTOMETRY | Facility: CLINIC | Age: 47
End: 2022-02-25
Payer: COMMERCIAL

## 2022-02-28 ENCOUNTER — OFFICE VISIT (OUTPATIENT)
Dept: OPTOMETRY | Facility: CLINIC | Age: 47
End: 2022-02-28
Payer: COMMERCIAL

## 2022-02-28 DIAGNOSIS — H52.4 MYOPIA WITH ASTIGMATISM AND PRESBYOPIA, BILATERAL: ICD-10-CM

## 2022-02-28 DIAGNOSIS — H43.392 VITREOUS FLOATERS OF LEFT EYE: Primary | ICD-10-CM

## 2022-02-28 DIAGNOSIS — H52.13 MYOPIA WITH ASTIGMATISM AND PRESBYOPIA, BILATERAL: ICD-10-CM

## 2022-02-28 DIAGNOSIS — H52.203 MYOPIA WITH ASTIGMATISM AND PRESBYOPIA, BILATERAL: ICD-10-CM

## 2022-02-28 DIAGNOSIS — R42 DIZZINESS: ICD-10-CM

## 2022-02-28 PROCEDURE — 92015 DETERMINE REFRACTIVE STATE: CPT | Mod: S$GLB,,, | Performed by: OPTOMETRIST

## 2022-02-28 PROCEDURE — 99999 PR PBB SHADOW E&M-EST. PATIENT-LVL III: ICD-10-PCS | Mod: PBBFAC,,, | Performed by: OPTOMETRIST

## 2022-02-28 PROCEDURE — 92015 PR REFRACTION: ICD-10-PCS | Mod: S$GLB,,, | Performed by: OPTOMETRIST

## 2022-02-28 PROCEDURE — 92014 COMPRE OPH EXAM EST PT 1/>: CPT | Mod: S$GLB,,, | Performed by: OPTOMETRIST

## 2022-02-28 PROCEDURE — 92014 PR EYE EXAM, EST PATIENT,COMPREHESV: ICD-10-PCS | Mod: S$GLB,,, | Performed by: OPTOMETRIST

## 2022-02-28 PROCEDURE — 1160F RVW MEDS BY RX/DR IN RCRD: CPT | Mod: CPTII,S$GLB,, | Performed by: OPTOMETRIST

## 2022-02-28 PROCEDURE — 1159F PR MEDICATION LIST DOCUMENTED IN MEDICAL RECORD: ICD-10-PCS | Mod: CPTII,S$GLB,, | Performed by: OPTOMETRIST

## 2022-02-28 PROCEDURE — 99999 PR PBB SHADOW E&M-EST. PATIENT-LVL III: CPT | Mod: PBBFAC,,, | Performed by: OPTOMETRIST

## 2022-02-28 PROCEDURE — 1160F PR REVIEW ALL MEDS BY PRESCRIBER/CLIN PHARMACIST DOCUMENTED: ICD-10-PCS | Mod: CPTII,S$GLB,, | Performed by: OPTOMETRIST

## 2022-02-28 PROCEDURE — 1159F MED LIST DOCD IN RCRD: CPT | Mod: CPTII,S$GLB,, | Performed by: OPTOMETRIST

## 2022-02-28 RX ORDER — ACETAMINOPHEN 500 MG
TABLET ORAL
COMMUNITY
Start: 2022-01-04

## 2022-02-28 RX ORDER — CALCIUM CARBONATE 260MG(650)
TABLET,CHEWABLE ORAL
COMMUNITY
Start: 2022-01-04

## 2022-02-28 NOTE — PROGRESS NOTES
TERE     KRISTI: 06/2020  Chief complaint (CC): Patient has noticed for the past 2 weeks her vision   centrally is blurred but above and below is clear. Patient has also felt   dizzy for the past week.  Glasses? OTC +.50  Contacts? -  H/o eye surgery, injections or laser: -  H/o eye injury: -  Known eye conditions? floaters  Family h/o eye conditions? Maternal grandmother with glaucoma  Eye gtts? -      (-) Flashes (+)  Floaters (-) Mucous   (-)  Tearing (-) Itching (-) Burning   (-) Headaches (-) Eye Pain/discomfort (-) Irritation   (-)  Redness (-) Double vision (-) Blurry vision    Diabetic? -  A1c? -        Last edited by Louann Verma on 2/28/2022  9:38 AM. (History)            Assessment /Plan     For exam results, see Encounter Report.    Vitreous floaters of left eye    Dizziness    Myopia with astigmatism and presbyopia, bilateral    1. No e/o h/b/t 360 degrees OU. Monitor for worsening of symptoms or S/Sx of RD.   2. No e/o ocular pathology found in association. Consider PCP involvement and possible ENT referral if persistent after Rx.   3. SRx released to patient. Patient educated on lens options. Normal ocular health. RTC 1 year for routine exam.

## 2022-04-14 ENCOUNTER — OFFICE VISIT (OUTPATIENT)
Dept: URGENT CARE | Facility: CLINIC | Age: 47
End: 2022-04-14
Payer: COMMERCIAL

## 2022-04-14 VITALS
WEIGHT: 160 LBS | BODY MASS INDEX: 25.71 KG/M2 | SYSTOLIC BLOOD PRESSURE: 114 MMHG | HEIGHT: 66 IN | OXYGEN SATURATION: 96 % | RESPIRATION RATE: 18 BRPM | TEMPERATURE: 99 F | DIASTOLIC BLOOD PRESSURE: 73 MMHG | HEART RATE: 88 BPM

## 2022-04-14 DIAGNOSIS — K21.9 GASTROESOPHAGEAL REFLUX DISEASE, UNSPECIFIED WHETHER ESOPHAGITIS PRESENT: ICD-10-CM

## 2022-04-14 DIAGNOSIS — U07.1 COVID-19: Primary | ICD-10-CM

## 2022-04-14 DIAGNOSIS — J02.9 SORE THROAT: ICD-10-CM

## 2022-04-14 LAB
CTP QC/QA: YES
SARS-COV-2 RDRP RESP QL NAA+PROBE: POSITIVE

## 2022-04-14 PROCEDURE — 3008F BODY MASS INDEX DOCD: CPT | Mod: CPTII,S$GLB,,

## 2022-04-14 PROCEDURE — 3074F PR MOST RECENT SYSTOLIC BLOOD PRESSURE < 130 MM HG: ICD-10-PCS | Mod: CPTII,S$GLB,,

## 2022-04-14 PROCEDURE — 1160F RVW MEDS BY RX/DR IN RCRD: CPT | Mod: CPTII,S$GLB,,

## 2022-04-14 PROCEDURE — 3078F PR MOST RECENT DIASTOLIC BLOOD PRESSURE < 80 MM HG: ICD-10-PCS | Mod: CPTII,S$GLB,,

## 2022-04-14 PROCEDURE — 1159F PR MEDICATION LIST DOCUMENTED IN MEDICAL RECORD: ICD-10-PCS | Mod: CPTII,S$GLB,,

## 2022-04-14 PROCEDURE — 3078F DIAST BP <80 MM HG: CPT | Mod: CPTII,S$GLB,,

## 2022-04-14 PROCEDURE — U0002: ICD-10-PCS | Mod: QW,S$GLB,,

## 2022-04-14 PROCEDURE — 3008F PR BODY MASS INDEX (BMI) DOCUMENTED: ICD-10-PCS | Mod: CPTII,S$GLB,,

## 2022-04-14 PROCEDURE — U0002 COVID-19 LAB TEST NON-CDC: HCPCS | Mod: QW,S$GLB,,

## 2022-04-14 PROCEDURE — 99213 PR OFFICE/OUTPT VISIT, EST, LEVL III, 20-29 MIN: ICD-10-PCS | Mod: S$GLB,,,

## 2022-04-14 PROCEDURE — 99213 OFFICE O/P EST LOW 20 MIN: CPT | Mod: S$GLB,,,

## 2022-04-14 PROCEDURE — 1159F MED LIST DOCD IN RCRD: CPT | Mod: CPTII,S$GLB,,

## 2022-04-14 PROCEDURE — 3074F SYST BP LT 130 MM HG: CPT | Mod: CPTII,S$GLB,,

## 2022-04-14 PROCEDURE — 1160F PR REVIEW ALL MEDS BY PRESCRIBER/CLIN PHARMACIST DOCUMENTED: ICD-10-PCS | Mod: CPTII,S$GLB,,

## 2022-04-14 RX ORDER — PANTOPRAZOLE SODIUM 20 MG/1
20 TABLET, DELAYED RELEASE ORAL DAILY
Qty: 30 TABLET | Refills: 11 | Status: SHIPPED | OUTPATIENT
Start: 2022-04-14 | End: 2022-07-11

## 2022-04-14 NOTE — PROGRESS NOTES
"Subjective:       Patient ID: Karla Washington is a 47 y.o. female.    Vitals:  height is 5' 6" (1.676 m) and weight is 72.6 kg (160 lb). Her temperature is 98.5 °F (36.9 °C). Her blood pressure is 114/73 and her pulse is 88. Her respiration is 18 and oxygen saturation is 96%.     Chief Complaint: Nasal Congestion (Sore throat, acid reflux - Entered by patient) and Gastroesophageal Reflux    46 yo female with no significant PMHx complains of congestion, post nasal drip, sore throat from acid reflux, nausea, and acid reflux. She states she ate more than usual last night and was having increased acid reflux. She took 3 at home COVID tests over the last 3 days which were negative but patient states symptoms have changed and she would like to be tested. She denies vomiting, abdominal pain, chest pain, shortness of breath, fever.     Sore Throat   This is a new problem. The current episode started yesterday. The problem has been unchanged. Neither side of throat is experiencing more pain than the other. Maximum temperature: Pt states low grade fever.  The pain is at a severity of 3/10. The pain is mild. Associated symptoms include congestion and coughing. Pertinent negatives include no abdominal pain, diarrhea, drooling, ear discharge, ear pain, headaches, hoarse voice, plugged ear sensation, neck pain, shortness of breath, stridor, swollen glands, trouble swallowing or vomiting. Associated symptoms comments: Pt states not a productive cough. . She has had no exposure to strep or mono. Treatments tried: allergy pill  The treatment provided no relief.       Constitution: Positive for fatigue. Negative for chills, sweating and fever.   HENT: Positive for congestion, postnasal drip and sore throat. Negative for ear pain, ear discharge, drooling, sinus pain, sinus pressure and trouble swallowing.    Neck: Negative for neck pain.   Cardiovascular: Negative for chest pain and sob on exertion.   Respiratory: Positive for cough. " Negative for shortness of breath and stridor.    Gastrointestinal: Positive for nausea and heartburn. Negative for abdominal pain, vomiting and diarrhea.        Pt states acid reflux    Neurological: Negative for headaches.       Objective:      Physical Exam   Constitutional: She is oriented to person, place, and time. She appears well-developed. She is cooperative.  Non-toxic appearance. She does not appear ill. No distress.   HENT:   Head: Normocephalic and atraumatic.   Ears:   Right Ear: Hearing, tympanic membrane, external ear and ear canal normal.   Left Ear: Hearing, tympanic membrane, external ear and ear canal normal.   Nose: Congestion present. No mucosal edema, rhinorrhea or nasal deformity. No epistaxis. Right sinus exhibits no maxillary sinus tenderness and no frontal sinus tenderness. Left sinus exhibits no maxillary sinus tenderness and no frontal sinus tenderness.   Mouth/Throat: Uvula is midline, oropharynx is clear and moist and mucous membranes are normal. Mucous membranes are moist. No trismus in the jaw. Normal dentition. No uvula swelling. No posterior oropharyngeal erythema.   Eyes: Conjunctivae and lids are normal. Right eye exhibits no discharge. Left eye exhibits no discharge. No scleral icterus.   Neck: Trachea normal and phonation normal. Neck supple.   Cardiovascular: Normal rate, regular rhythm, normal heart sounds and normal pulses.   Pulmonary/Chest: Effort normal and breath sounds normal. No respiratory distress. She has no wheezes. She has no rhonchi.   Abdominal: Normal appearance and bowel sounds are normal. She exhibits no distension and no mass. Soft. flat abdomenThere is no abdominal tenderness.   Musculoskeletal: Normal range of motion.         General: No deformity. Normal range of motion.   Neurological: She is alert and oriented to person, place, and time. She exhibits normal muscle tone. Coordination normal.   Skin: Skin is warm, dry, intact, not diaphoretic and not pale.    Psychiatric: Her speech is normal and behavior is normal. Judgment and thought content normal.   Nursing note and vitals reviewed.        Results for orders placed or performed in visit on 04/14/22   POCT COVID-19 Rapid Screening   Result Value Ref Range    POC Rapid COVID Positive (A) Negative     Acceptable Yes        Assessment:       1. COVID-19    2. Sore throat    3. Gastroesophageal reflux disease, unspecified whether esophagitis present          Plan:       Discussed results with patient and proper quarantine based on CDC guidelines.   Discussed use of OTC medications for symptom control as this is a viral disease.   All ER precautions covered including but not limited to shortness of breath, intractable fever, or chest pain.  Discussed RTC if symptoms worsen, change, or persist.   Patient has been given magic mouthwash and protonix for acid reflux and sore throat.   Patient verbalized understanding and agreed with the plan.         COVID-19  -     (Magic mouthwash) 1:1:1 diphenhydramine(Benadryl) 12.5mg/5ml liq, aluminum & magnesium hydroxide-simethicone (Maalox), LIDOcaine viscous 2%; Swish and spit 5 mLs every 4 (four) hours as needed (sore throat). For sore throat  Dispense: 90 mL; Refill: 0    Sore throat  -     POCT COVID-19 Rapid Screening  -     (Magic mouthwash) 1:1:1 diphenhydramine(Benadryl) 12.5mg/5ml liq, aluminum & magnesium hydroxide-simethicone (Maalox), LIDOcaine viscous 2%; Swish and spit 5 mLs every 4 (four) hours as needed (sore throat). For sore throat  Dispense: 90 mL; Refill: 0    Gastroesophageal reflux disease, unspecified whether esophagitis present  -     pantoprazole (PROTONIX) 20 MG tablet; Take 1 tablet (20 mg total) by mouth once daily.  Dispense: 30 tablet; Refill: 11

## 2022-04-14 NOTE — PATIENT INSTRUCTIONS
You have tested positive for COVID-19 today.      ISOLATION  If you tested positive and do not have symptoms, you must isolate for 5 days starting on the day of the positive test. I    If you tested positive and have symptoms, you must isolate for 5 days starting on the day of the first symptoms,  not the day of the positive test.     This is the most important part, both the CDC and the [facility] emphasize that you do not test out of isolation.     After 5 days, if your symptoms have improved and you have not had fever on day 5, you can return to the community on day 6- NO TESTING REQUIRED!      In fact, we do not retest if you were positive in the last 90 days.    After your 5 days of isolation are completed, the CDC recommends strict mask use for the first 5 days that you come out of isolation.    PLEASE READ YOUR DISCHARGE INSTRUCTIONS ENTIRELY AS IT CONTAINS IMPORTANT INFORMATION.      Please drink plenty of fluids.    Please get plenty of rest.    Please return here or go to the Emergency Department for any concerns or worsening of condition.    Please take an over the counter antihistamine medication (allegra/Claritin/Zyrtec) of your choice as directed for allergy symptoms and/or runny nose and postnasal drip.    Try an over the counter decongestant for sinus pressure/ear pressure, congestion symptoms like Mucinex D or Sudafed or Phenylephrine. You buy this behind the pharmacy counter    If you do have Hypertension or palpitations, it is safe to take Coricidin HBP for relief of sinus symptoms.    Tylenol or ibuprofen can also be used as directed for pain and fever unless you have an allergy to them or medical condition such as stomach ulcers, kidney or liver disease or blood thinners etc for which you should not be taking these type of medications.     Sore throat recommendations: Warm fluids, warm salt water gargles, throat lozenges, tea, honey, soup, rest, hydration.    Use over the counter flonase or nasocort:  one spray each nostril twice daily OR two sprays each nostril once daily until nares dry out, unless you have Glaucoma.   Can also supplement with nasal saline rinse.    Sinus rinses DO NOT USE TAP WATER, if you must, water must be a rolling boil for 1 minute, let it cool, then use.  May use distilled water, or over the counter nasal saline rinses.  Vics vapor rub in shower to help open nasal passages.  May use nasal gel to keep passages moisturized.  May use Nasal saline sprays during the day for added relief of congestion.   For those who go to the gym, please do not use the sauna or steam room now to clear sinuses.      Cough     Rest and fluids are important  Can use honey with mallika to soothe your throat    Robitussin or Delsyum for cough suppressant for dry cough.    Mucinex DM or products containing Guaifenesin or Dextromethorphan for expectorant (wet cough).    Take prescription cough meds (pills) as prescribed; take prescription cough syrup at night as needed for cough.  Do not take both the prescribed cough pills and syrup at the same time or within 6 hours of each other.  Do not take the cough syrup with any other sedative medication as it can can cause drowsiness. Do not operate any heavy machinery, drink or drive while taking the cough syrup.     Please follow up with your primary care doctor or specialist in the next 48-72hrs as needed and if no improvement    If you  smoke, please stop smoking.      Please return or see your primary care doctor if you develop new or worsening symptoms.     Please arrange follow up with your primary medical clinic as soon as possible. You must understand that you've received an Urgent Care treatment only and that you may be released before all of your medical problems are known or treated. You, the patient, will arrange for follow up as instructed. If your symptoms worsen or fail to improve you should go to the Emergency Room.

## 2022-05-03 ENCOUNTER — PATIENT MESSAGE (OUTPATIENT)
Dept: RESEARCH | Facility: HOSPITAL | Age: 47
End: 2022-05-03
Payer: COMMERCIAL

## 2022-07-11 ENCOUNTER — LAB VISIT (OUTPATIENT)
Dept: LAB | Facility: OTHER | Age: 47
End: 2022-07-11
Attending: INTERNAL MEDICINE
Payer: COMMERCIAL

## 2022-07-11 ENCOUNTER — TELEPHONE (OUTPATIENT)
Dept: INTERNAL MEDICINE | Facility: CLINIC | Age: 47
End: 2022-07-11

## 2022-07-11 ENCOUNTER — OFFICE VISIT (OUTPATIENT)
Dept: INTERNAL MEDICINE | Facility: CLINIC | Age: 47
End: 2022-07-11
Attending: INTERNAL MEDICINE
Payer: COMMERCIAL

## 2022-07-11 VITALS
HEIGHT: 66 IN | DIASTOLIC BLOOD PRESSURE: 74 MMHG | HEART RATE: 71 BPM | SYSTOLIC BLOOD PRESSURE: 100 MMHG | BODY MASS INDEX: 24.52 KG/M2 | WEIGHT: 152.56 LBS | OXYGEN SATURATION: 98 %

## 2022-07-11 DIAGNOSIS — T78.3XXS ANGIOEDEMA, SEQUELA: Primary | ICD-10-CM

## 2022-07-11 DIAGNOSIS — Z83.49 FAMILY HISTORY OF THYROID DISEASE: ICD-10-CM

## 2022-07-11 DIAGNOSIS — E04.1 THYROID NODULE: ICD-10-CM

## 2022-07-11 DIAGNOSIS — Z11.59 ENCOUNTER FOR HEPATITIS C SCREENING TEST FOR LOW RISK PATIENT: ICD-10-CM

## 2022-07-11 DIAGNOSIS — Z12.31 ENCOUNTER FOR SCREENING MAMMOGRAM FOR BREAST CANCER: ICD-10-CM

## 2022-07-11 DIAGNOSIS — Z00.00 ANNUAL PHYSICAL EXAM: Primary | ICD-10-CM

## 2022-07-11 DIAGNOSIS — R21 RASH AND NONSPECIFIC SKIN ERUPTION: ICD-10-CM

## 2022-07-11 DIAGNOSIS — T78.3XXS ANGIOEDEMA, SEQUELA: ICD-10-CM

## 2022-07-11 DIAGNOSIS — Z00.00 ANNUAL PHYSICAL EXAM: ICD-10-CM

## 2022-07-11 DIAGNOSIS — Z12.39 ENCOUNTER FOR SCREENING FOR MALIGNANT NEOPLASM OF BREAST, UNSPECIFIED SCREENING MODALITY: ICD-10-CM

## 2022-07-11 DIAGNOSIS — Z11.4 SCREENING FOR HIV WITHOUT PRESENCE OF RISK FACTORS: ICD-10-CM

## 2022-07-11 DIAGNOSIS — Z12.11 COLON CANCER SCREENING: ICD-10-CM

## 2022-07-11 LAB
ALBUMIN SERPL BCP-MCNC: 4.1 G/DL (ref 3.5–5.2)
ALP SERPL-CCNC: 36 U/L (ref 55–135)
ALT SERPL W/O P-5'-P-CCNC: 12 U/L (ref 10–44)
ANION GAP SERPL CALC-SCNC: 11 MMOL/L (ref 8–16)
AST SERPL-CCNC: 18 U/L (ref 10–40)
BASOPHILS # BLD AUTO: 0.04 K/UL (ref 0–0.2)
BASOPHILS NFR BLD: 0.7 % (ref 0–1.9)
BILIRUB SERPL-MCNC: 1.6 MG/DL (ref 0.1–1)
BUN SERPL-MCNC: 11 MG/DL (ref 6–20)
CALCIUM SERPL-MCNC: 9 MG/DL (ref 8.7–10.5)
CHLORIDE SERPL-SCNC: 105 MMOL/L (ref 95–110)
CHOLEST SERPL-MCNC: 240 MG/DL (ref 120–199)
CHOLEST/HDLC SERPL: 5.2 {RATIO} (ref 2–5)
CO2 SERPL-SCNC: 23 MMOL/L (ref 23–29)
CREAT SERPL-MCNC: 0.8 MG/DL (ref 0.5–1.4)
DIFFERENTIAL METHOD: NORMAL
EOSINOPHIL # BLD AUTO: 0 K/UL (ref 0–0.5)
EOSINOPHIL NFR BLD: 0.6 % (ref 0–8)
ERYTHROCYTE [DISTWIDTH] IN BLOOD BY AUTOMATED COUNT: 12.2 % (ref 11.5–14.5)
EST. GFR  (AFRICAN AMERICAN): >60 ML/MIN/1.73 M^2
EST. GFR  (NON AFRICAN AMERICAN): >60 ML/MIN/1.73 M^2
ESTIMATED AVG GLUCOSE: 97 MG/DL (ref 68–131)
GLUCOSE SERPL-MCNC: 86 MG/DL (ref 70–110)
HBA1C MFR BLD: 5 % (ref 4–5.6)
HCT VFR BLD AUTO: 41.8 % (ref 37–48.5)
HDLC SERPL-MCNC: 46 MG/DL (ref 40–75)
HDLC SERPL: 19.2 % (ref 20–50)
HGB BLD-MCNC: 13.8 G/DL (ref 12–16)
IMM GRANULOCYTES # BLD AUTO: 0.01 K/UL (ref 0–0.04)
IMM GRANULOCYTES NFR BLD AUTO: 0.2 % (ref 0–0.5)
LDLC SERPL CALC-MCNC: 179.2 MG/DL (ref 63–159)
LYMPHOCYTES # BLD AUTO: 2 K/UL (ref 1–4.8)
LYMPHOCYTES NFR BLD: 36.9 % (ref 18–48)
MCH RBC QN AUTO: 29.6 PG (ref 27–31)
MCHC RBC AUTO-ENTMCNC: 33 G/DL (ref 32–36)
MCV RBC AUTO: 90 FL (ref 82–98)
MONOCYTES # BLD AUTO: 0.4 K/UL (ref 0.3–1)
MONOCYTES NFR BLD: 6.9 % (ref 4–15)
NEUTROPHILS # BLD AUTO: 2.9 K/UL (ref 1.8–7.7)
NEUTROPHILS NFR BLD: 54.7 % (ref 38–73)
NONHDLC SERPL-MCNC: 194 MG/DL
NRBC BLD-RTO: 0 /100 WBC
PLATELET # BLD AUTO: 190 K/UL (ref 150–450)
PMV BLD AUTO: 11 FL (ref 9.2–12.9)
POTASSIUM SERPL-SCNC: 4.4 MMOL/L (ref 3.5–5.1)
PROT SERPL-MCNC: 6.6 G/DL (ref 6–8.4)
RBC # BLD AUTO: 4.66 M/UL (ref 4–5.4)
SODIUM SERPL-SCNC: 139 MMOL/L (ref 136–145)
TRIGL SERPL-MCNC: 74 MG/DL (ref 30–150)
TSH SERPL DL<=0.005 MIU/L-ACNC: 1 UIU/ML (ref 0.4–4)
WBC # BLD AUTO: 5.34 K/UL (ref 3.9–12.7)

## 2022-07-11 PROCEDURE — 99999 PR PBB SHADOW E&M-EST. PATIENT-LVL IV: ICD-10-PCS | Mod: PBBFAC,,, | Performed by: INTERNAL MEDICINE

## 2022-07-11 PROCEDURE — 99999 PR PBB SHADOW E&M-EST. PATIENT-LVL IV: CPT | Mod: PBBFAC,,, | Performed by: INTERNAL MEDICINE

## 2022-07-11 PROCEDURE — 1160F RVW MEDS BY RX/DR IN RCRD: CPT | Mod: CPTII,S$GLB,, | Performed by: INTERNAL MEDICINE

## 2022-07-11 PROCEDURE — 83036 HEMOGLOBIN GLYCOSYLATED A1C: CPT | Performed by: INTERNAL MEDICINE

## 2022-07-11 PROCEDURE — 3074F SYST BP LT 130 MM HG: CPT | Mod: CPTII,S$GLB,, | Performed by: INTERNAL MEDICINE

## 2022-07-11 PROCEDURE — 3078F DIAST BP <80 MM HG: CPT | Mod: CPTII,S$GLB,, | Performed by: INTERNAL MEDICINE

## 2022-07-11 PROCEDURE — 80061 LIPID PANEL: CPT | Performed by: INTERNAL MEDICINE

## 2022-07-11 PROCEDURE — 36415 COLL VENOUS BLD VENIPUNCTURE: CPT | Performed by: INTERNAL MEDICINE

## 2022-07-11 PROCEDURE — 1160F PR REVIEW ALL MEDS BY PRESCRIBER/CLIN PHARMACIST DOCUMENTED: ICD-10-PCS | Mod: CPTII,S$GLB,, | Performed by: INTERNAL MEDICINE

## 2022-07-11 PROCEDURE — 3008F BODY MASS INDEX DOCD: CPT | Mod: CPTII,S$GLB,, | Performed by: INTERNAL MEDICINE

## 2022-07-11 PROCEDURE — 84443 ASSAY THYROID STIM HORMONE: CPT | Performed by: INTERNAL MEDICINE

## 2022-07-11 PROCEDURE — 3008F PR BODY MASS INDEX (BMI) DOCUMENTED: ICD-10-PCS | Mod: CPTII,S$GLB,, | Performed by: INTERNAL MEDICINE

## 2022-07-11 PROCEDURE — 86803 HEPATITIS C AB TEST: CPT | Performed by: INTERNAL MEDICINE

## 2022-07-11 PROCEDURE — 99396 PREV VISIT EST AGE 40-64: CPT | Mod: S$GLB,,, | Performed by: INTERNAL MEDICINE

## 2022-07-11 PROCEDURE — 99396 PR PREVENTIVE VISIT,EST,40-64: ICD-10-PCS | Mod: S$GLB,,, | Performed by: INTERNAL MEDICINE

## 2022-07-11 PROCEDURE — 85025 COMPLETE CBC W/AUTO DIFF WBC: CPT | Performed by: INTERNAL MEDICINE

## 2022-07-11 PROCEDURE — 80053 COMPREHEN METABOLIC PANEL: CPT | Performed by: INTERNAL MEDICINE

## 2022-07-11 PROCEDURE — 1159F MED LIST DOCD IN RCRD: CPT | Mod: CPTII,S$GLB,, | Performed by: INTERNAL MEDICINE

## 2022-07-11 PROCEDURE — 3074F PR MOST RECENT SYSTOLIC BLOOD PRESSURE < 130 MM HG: ICD-10-PCS | Mod: CPTII,S$GLB,, | Performed by: INTERNAL MEDICINE

## 2022-07-11 PROCEDURE — 87389 HIV-1 AG W/HIV-1&-2 AB AG IA: CPT | Performed by: INTERNAL MEDICINE

## 2022-07-11 PROCEDURE — 1159F PR MEDICATION LIST DOCUMENTED IN MEDICAL RECORD: ICD-10-PCS | Mod: CPTII,S$GLB,, | Performed by: INTERNAL MEDICINE

## 2022-07-11 PROCEDURE — 3078F PR MOST RECENT DIASTOLIC BLOOD PRESSURE < 80 MM HG: ICD-10-PCS | Mod: CPTII,S$GLB,, | Performed by: INTERNAL MEDICINE

## 2022-07-11 RX ORDER — CLOBETASOL PROPIONATE 0.5 MG/G
CREAM TOPICAL
Qty: 30 G | Refills: 0 | Status: SHIPPED | OUTPATIENT
Start: 2022-07-11 | End: 2023-05-09

## 2022-07-11 NOTE — PROGRESS NOTES
"Subjective:       Patient ID: Karla Washington is a 47 y.o. female.    Chief Complaint: Annual Exam    Here for annual exam  LCV 2018    Patient presents today for routine evaluation, physical, and labs. Patient has no major concerns or complaints today.       Has lost 30lb since last visit with diet and exercise.     Cracking right heel for several months.         Review of Systems   Constitutional: Negative for chills, fatigue, fever and unexpected weight change.   HENT: Negative for ear pain, hearing loss, postnasal drip, tinnitus, trouble swallowing and voice change.    Respiratory: Negative for cough, chest tightness, shortness of breath and wheezing.    Cardiovascular: Negative for chest pain, palpitations and leg swelling.   Gastrointestinal: Negative for abdominal pain, blood in stool, diarrhea, nausea and vomiting.   Endocrine: Negative for polydipsia, polyphagia and polyuria.   Genitourinary: Negative for difficulty urinating, dysuria, hematuria and vaginal bleeding.   Skin: Positive for rash.   Allergic/Immunologic: Negative for food allergies.   Neurological: Negative for dizziness, numbness and headaches.   Hematological: Does not bruise/bleed easily.   Psychiatric/Behavioral: The patient is not nervous/anxious.        Objective:      Vitals:    07/11/22 0936   BP: 100/74   Pulse: 71   SpO2: 98%   Weight: 69.2 kg (152 lb 8.9 oz)   Height: 5' 6" (1.676 m)      Physical Exam  Constitutional:       General: She is not in acute distress.     Appearance: She is well-developed.   HENT:      Head: Normocephalic and atraumatic.      Mouth/Throat:      Pharynx: No oropharyngeal exudate.   Eyes:      General: No scleral icterus.     Conjunctiva/sclera: Conjunctivae normal.      Pupils: Pupils are equal, round, and reactive to light.   Neck:      Thyroid: No thyromegaly.   Cardiovascular:      Rate and Rhythm: Normal rate and regular rhythm.      Heart sounds: Normal heart sounds. No murmur heard.  Pulmonary:      " Effort: Pulmonary effort is normal.      Breath sounds: Normal breath sounds. No wheezing or rales.   Abdominal:      General: There is no distension.      Palpations: Abdomen is soft.      Tenderness: There is no abdominal tenderness.   Musculoskeletal:         General: No tenderness.   Lymphadenopathy:      Cervical: No cervical adenopathy.   Skin:     General: Skin is warm and dry.   Neurological:      Mental Status: She is alert and oriented to person, place, and time.   Psychiatric:         Behavior: Behavior normal.         Assessment:       1. Annual physical exam    2. Thyroid nodule    3. Family history of thyroid disease    4. Angioedema, sequela    5. Rash and nonspecific skin eruption    6. Encounter for hepatitis C screening test for low risk patient    7. Screening for HIV without presence of risk factors    8. Encounter for screening mammogram for breast cancer        Plan:       Karla was seen today for annual exam.    Diagnoses and all orders for this visit:    Annual physical exam  -     Comprehensive Metabolic Panel; Future  -     Lipid Panel; Future  -     TSH; Future  -     CBC Auto Differential; Future  -     Hemoglobin A1C; Future    Thyroid nodule    Family history of thyroid disease    Angioedema, sequela    Rash and nonspecific skin eruption  -     Ambulatory referral/consult to Dermatology; Future  -     clobetasoL (TEMOVATE) 0.05 % cream; Apply BID 2-4 weeks only unless already discussed    Encounter for hepatitis C screening test for low risk patient  -     Hepatitis C Antibody; Future    Screening for HIV without presence of risk factors  -     HIV 1/2 Ag/Ab (4th Gen); Future    Encounter for screening mammogram for breast cancer  -     Mammo Digital Screening Bilat w/ Odilon; Future               Geovani Pena MD  Internal Medicine-[facility]        Side effects of medication(s) were discussed in detail and patient voiced understanding.  Patient will call back for any issues or  complications.

## 2022-07-12 LAB — HCV AB SERPL QL IA: NEGATIVE

## 2022-07-13 LAB — HIV 1+2 AB+HIV1 P24 AG SERPL QL IA: NEGATIVE

## 2022-07-15 ENCOUNTER — PATIENT MESSAGE (OUTPATIENT)
Dept: INTERNAL MEDICINE | Facility: CLINIC | Age: 47
End: 2022-07-15

## 2022-07-20 ENCOUNTER — OFFICE VISIT (OUTPATIENT)
Dept: OBSTETRICS AND GYNECOLOGY | Facility: CLINIC | Age: 47
End: 2022-07-20
Payer: COMMERCIAL

## 2022-07-20 VITALS
SYSTOLIC BLOOD PRESSURE: 106 MMHG | HEIGHT: 66 IN | BODY MASS INDEX: 24.91 KG/M2 | WEIGHT: 155 LBS | DIASTOLIC BLOOD PRESSURE: 60 MMHG

## 2022-07-20 DIAGNOSIS — Z12.31 VISIT FOR SCREENING MAMMOGRAM: ICD-10-CM

## 2022-07-20 DIAGNOSIS — Z01.419 WOMEN'S ANNUAL ROUTINE GYNECOLOGICAL EXAMINATION: Primary | ICD-10-CM

## 2022-07-20 DIAGNOSIS — N95.1 PERIMENOPAUSAL: ICD-10-CM

## 2022-07-20 PROCEDURE — 3008F PR BODY MASS INDEX (BMI) DOCUMENTED: ICD-10-PCS | Mod: CPTII,S$GLB,, | Performed by: OBSTETRICS & GYNECOLOGY

## 2022-07-20 PROCEDURE — 3044F HG A1C LEVEL LT 7.0%: CPT | Mod: CPTII,S$GLB,, | Performed by: OBSTETRICS & GYNECOLOGY

## 2022-07-20 PROCEDURE — 3044F PR MOST RECENT HEMOGLOBIN A1C LEVEL <7.0%: ICD-10-PCS | Mod: CPTII,S$GLB,, | Performed by: OBSTETRICS & GYNECOLOGY

## 2022-07-20 PROCEDURE — 1160F RVW MEDS BY RX/DR IN RCRD: CPT | Mod: CPTII,S$GLB,, | Performed by: OBSTETRICS & GYNECOLOGY

## 2022-07-20 PROCEDURE — 99396 PREV VISIT EST AGE 40-64: CPT | Mod: S$GLB,,, | Performed by: OBSTETRICS & GYNECOLOGY

## 2022-07-20 PROCEDURE — 99999 PR PBB SHADOW E&M-EST. PATIENT-LVL III: CPT | Mod: PBBFAC,,, | Performed by: OBSTETRICS & GYNECOLOGY

## 2022-07-20 PROCEDURE — 3008F BODY MASS INDEX DOCD: CPT | Mod: CPTII,S$GLB,, | Performed by: OBSTETRICS & GYNECOLOGY

## 2022-07-20 PROCEDURE — 99999 PR PBB SHADOW E&M-EST. PATIENT-LVL III: ICD-10-PCS | Mod: PBBFAC,,, | Performed by: OBSTETRICS & GYNECOLOGY

## 2022-07-20 PROCEDURE — 1159F MED LIST DOCD IN RCRD: CPT | Mod: CPTII,S$GLB,, | Performed by: OBSTETRICS & GYNECOLOGY

## 2022-07-20 PROCEDURE — 3078F PR MOST RECENT DIASTOLIC BLOOD PRESSURE < 80 MM HG: ICD-10-PCS | Mod: CPTII,S$GLB,, | Performed by: OBSTETRICS & GYNECOLOGY

## 2022-07-20 PROCEDURE — 3078F DIAST BP <80 MM HG: CPT | Mod: CPTII,S$GLB,, | Performed by: OBSTETRICS & GYNECOLOGY

## 2022-07-20 PROCEDURE — 1160F PR REVIEW ALL MEDS BY PRESCRIBER/CLIN PHARMACIST DOCUMENTED: ICD-10-PCS | Mod: CPTII,S$GLB,, | Performed by: OBSTETRICS & GYNECOLOGY

## 2022-07-20 PROCEDURE — 99396 PR PREVENTIVE VISIT,EST,40-64: ICD-10-PCS | Mod: S$GLB,,, | Performed by: OBSTETRICS & GYNECOLOGY

## 2022-07-20 PROCEDURE — 1159F PR MEDICATION LIST DOCUMENTED IN MEDICAL RECORD: ICD-10-PCS | Mod: CPTII,S$GLB,, | Performed by: OBSTETRICS & GYNECOLOGY

## 2022-07-20 PROCEDURE — 3074F SYST BP LT 130 MM HG: CPT | Mod: CPTII,S$GLB,, | Performed by: OBSTETRICS & GYNECOLOGY

## 2022-07-20 PROCEDURE — 3074F PR MOST RECENT SYSTOLIC BLOOD PRESSURE < 130 MM HG: ICD-10-PCS | Mod: CPTII,S$GLB,, | Performed by: OBSTETRICS & GYNECOLOGY

## 2022-07-20 NOTE — PROGRESS NOTES
Karla Washington is a 47 y.o. female  who presents for annual exam.  Over the past year, menses have spaced out, now occurring every 1-2 months, lasting 3-4 days in duration.  She denies having hot flashes and sweats.  However, she has noticed an increase in headaches.  She has been followed in the high risk breast clinic at [facility] with alternating mammograms and breast MRIs. Denies recent changes in her medical surgical history.  No gyn complaints.  Patient's last menstrual period was 2022.     21 Pap: Negative, HPV: Negative    Past Medical History:   Diagnosis Date    Abnormal Pap smear of cervix     Angio-edema     Fibroid of cervix     History of colposcopy 1997    x2   ,        Past Surgical History:   Procedure Laterality Date    TONSILLECTOMY         OB History        0    Para   0    Term   0       0    AB   0    Living   0       SAB   0    IAB   0    Ectopic   0    Multiple   0    Live Births               Obstetric Comments   Menarche age 14.   Menses normal and regular with 26-28 day cycles, and 3-5 days of normal flow.  History of abnormal PAP smear: YES (HPV): colposcopy NEG.  History of sexually transmitted disease:  YES: HPV                      ROS:  GENERAL: Feeling well overall.   SKIN: Denies rash or lesions.   HEAD: Reports increased frequency of headaches.   NODES: Denies enlarged lymph nodes.   CHEST: Denies chest pain or shortness of breath.   CARDIOVASCULAR: Denies palpitations or left sided chest pain.   ABDOMEN: No abdominal pain, nausea, vomiting or rectal bleeding.   URINARY: No dysuria or hematuria.  REPRODUCTIVE: See HPI.   BREASTS: Denies pain, lumps, or nipple discharge.   HEMATOLOGIC: No easy bruisability or excessive bleeding.   MUSCULOSKELETAL: Denies joint pain or swelling.   NEUROLOGIC: Denies syncope or weakness.   PSYCHIATRIC: Denies depression.    PE:   (chaperone present during entire exam)  APPEARANCE: Well nourished, well  developed, in no acute distress.  BREASTS: Symmetrical, no skin changes or visible lesions. No palpable masses, nipple discharge or adenopathy bilaterally.  ABDOMEN: Soft. No tenderness or masses. No CVA tenderness.  VULVA: No lesions. Normal female genitalia.  URETHRAL MEATUS: Normal size and location, no lesions, no prolapse.  URETHRA: No masses, tenderness, prolapse or scarring.  VAGINA: No lesions, no abnormal discharge, no significant cystocele or rectocele.  CERVIX: No lesions and discharge.   UTERUS: Normal size, regular shape, mobile, non-tender, bladder base nontender.  ADNEXA: No masses, tenderness or CDS nodularity.  ANUS PERINEUM: Normal.      Diagnosis:  1. Women's annual routine gynecological examination    2. Perimenopausal    3. Visit for screening mammogram          PLAN:         Patient was counseled today on perimenopausal issues and expected bleeding patterns.  Mammogram scheduled for 7/25/22.    Follow-up in 1 year.

## 2022-07-31 ENCOUNTER — PATIENT MESSAGE (OUTPATIENT)
Dept: INTERNAL MEDICINE | Facility: CLINIC | Age: 47
End: 2022-07-31

## 2022-08-02 ENCOUNTER — OFFICE VISIT (OUTPATIENT)
Dept: INTERNAL MEDICINE | Facility: CLINIC | Age: 47
End: 2022-08-02
Attending: FAMILY MEDICINE
Payer: COMMERCIAL

## 2022-08-02 VITALS
DIASTOLIC BLOOD PRESSURE: 70 MMHG | HEART RATE: 95 BPM | OXYGEN SATURATION: 99 % | SYSTOLIC BLOOD PRESSURE: 116 MMHG | HEIGHT: 66 IN | WEIGHT: 151.88 LBS | BODY MASS INDEX: 24.41 KG/M2

## 2022-08-02 DIAGNOSIS — M54.42 ACUTE LEFT-SIDED LOW BACK PAIN WITH LEFT-SIDED SCIATICA: Primary | ICD-10-CM

## 2022-08-02 PROCEDURE — 3078F DIAST BP <80 MM HG: CPT | Mod: CPTII,S$GLB,, | Performed by: FAMILY MEDICINE

## 2022-08-02 PROCEDURE — 3074F PR MOST RECENT SYSTOLIC BLOOD PRESSURE < 130 MM HG: ICD-10-PCS | Mod: CPTII,S$GLB,, | Performed by: FAMILY MEDICINE

## 2022-08-02 PROCEDURE — 1159F MED LIST DOCD IN RCRD: CPT | Mod: CPTII,S$GLB,, | Performed by: FAMILY MEDICINE

## 2022-08-02 PROCEDURE — 1160F RVW MEDS BY RX/DR IN RCRD: CPT | Mod: CPTII,S$GLB,, | Performed by: FAMILY MEDICINE

## 2022-08-02 PROCEDURE — 3044F PR MOST RECENT HEMOGLOBIN A1C LEVEL <7.0%: ICD-10-PCS | Mod: CPTII,S$GLB,, | Performed by: FAMILY MEDICINE

## 2022-08-02 PROCEDURE — 99999 PR PBB SHADOW E&M-EST. PATIENT-LVL III: ICD-10-PCS | Mod: PBBFAC,,, | Performed by: FAMILY MEDICINE

## 2022-08-02 PROCEDURE — 3078F PR MOST RECENT DIASTOLIC BLOOD PRESSURE < 80 MM HG: ICD-10-PCS | Mod: CPTII,S$GLB,, | Performed by: FAMILY MEDICINE

## 2022-08-02 PROCEDURE — 3074F SYST BP LT 130 MM HG: CPT | Mod: CPTII,S$GLB,, | Performed by: FAMILY MEDICINE

## 2022-08-02 PROCEDURE — 99214 PR OFFICE/OUTPT VISIT, EST, LEVL IV, 30-39 MIN: ICD-10-PCS | Mod: S$GLB,,, | Performed by: FAMILY MEDICINE

## 2022-08-02 PROCEDURE — 3008F PR BODY MASS INDEX (BMI) DOCUMENTED: ICD-10-PCS | Mod: CPTII,S$GLB,, | Performed by: FAMILY MEDICINE

## 2022-08-02 PROCEDURE — 99999 PR PBB SHADOW E&M-EST. PATIENT-LVL III: CPT | Mod: PBBFAC,,, | Performed by: FAMILY MEDICINE

## 2022-08-02 PROCEDURE — 3044F HG A1C LEVEL LT 7.0%: CPT | Mod: CPTII,S$GLB,, | Performed by: FAMILY MEDICINE

## 2022-08-02 PROCEDURE — 3008F BODY MASS INDEX DOCD: CPT | Mod: CPTII,S$GLB,, | Performed by: FAMILY MEDICINE

## 2022-08-02 PROCEDURE — 1159F PR MEDICATION LIST DOCUMENTED IN MEDICAL RECORD: ICD-10-PCS | Mod: CPTII,S$GLB,, | Performed by: FAMILY MEDICINE

## 2022-08-02 PROCEDURE — 99214 OFFICE O/P EST MOD 30 MIN: CPT | Mod: S$GLB,,, | Performed by: FAMILY MEDICINE

## 2022-08-02 PROCEDURE — 1160F PR REVIEW ALL MEDS BY PRESCRIBER/CLIN PHARMACIST DOCUMENTED: ICD-10-PCS | Mod: CPTII,S$GLB,, | Performed by: FAMILY MEDICINE

## 2022-08-02 RX ORDER — DICLOFENAC SODIUM 50 MG/1
50 TABLET, DELAYED RELEASE ORAL 2 TIMES DAILY
Qty: 40 TABLET | Refills: 1 | Status: SHIPPED | OUTPATIENT
Start: 2022-08-02 | End: 2023-11-06

## 2022-08-02 NOTE — PROGRESS NOTES
"CHIEF COMPLAINT: Left sciatica    HISTORY OF PRESENT ILLNESS: The patient presents with low back pain and Left sciatica.  She was helping her mom transfer 1 week ago at the onset of pain.  She is quite uncomfortable.  She is having difficulty getting and walking.  She is a .  She finds Voltaren helpful typically.  She feels she is Slightly improved.  There is no bowel or bladder complaint.  The patient has no significant spinal history.      REVIEW OF SYSTEMS:  GENERAL: No fever, chills, fatigability or weight loss.  SKIN: No rashes, itching or changes in color or texture of skin.  HEAD: No headaches or recent head trauma.  EYES: Visual acuity fine. No photophobia, ocular pain or diplopia.  EARS: Denies ear pain, discharge or vertigo.  NOSE: No loss of smell, no epistaxis or postnasal drip.  MOUTH & THROAT: No hoarseness or change in voice. No excessive gum bleeding.  NODES: Denies swollen glands.  CHEST: Denies LOONEY, cyanosis, wheezing, cough and sputum production.  CARDIOVASCULAR: Denies chest pain, PND, orthopnea or reduced exercise tolerance.  ABDOMEN: Appetite fine. No weight loss. Denies diarrhea, abdominal pain, hematemesis or blood in stool.  URINARY: No flank pain, dysuria or hematuria.  PERIPHERAL VASCULAR: No claudication or cyanosis.  MUSCULOSKELETAL: No joint stiffness or swelling.  The patient does have back pain.  NEUROLOGIC: No history of seizures, paralysis, alteration of gait or coordination.    SOCIAL HISTORY: Unchanged since recent note by PCP.  She is a     PHYSICAL EXAMINATION:   Blood pressure 116/70, pulse 95, height 5' 6" (1.676 m), weight 68.9 kg (151 lb 14.4 oz), SpO2 99 %.  APPEARANCE: Well nourished, well developed, in no acute distress.  She appears uncomfortable  HEAD: Normocephalic, atraumatic.  EYES: PERRL. EOMI.  Conjunctivae without injection and  anicteric  NOSE: Mucosa pink. Airway clear.  MOUTH & THROAT: No tonsillar enlargement. No pharyngeal " erythema or exudate. No stridor.  NECK: Supple.   NODES: No cervical, axillary or inguinal lymph node enlargement.  ABDOMEN: Bowel sounds normal. Not distended. Soft. No tenderness or masses.  No ascites is noted.  MUSCULOSKELETAL:  There is no clubbing, cyanosis, or edema of the extremities x4.  There is full range of motion of the lumbar spine.  There is full range of motion of the extremities x4.  There is no deformity noted.    NEUROLOGIC:       Normal speech development.      Hearing normal.      Antalgic gait.      Motor and sensory exams grossly normal.  PSYCHIATRIC: Patient is alert and oriented x3.  Thought processes are all normal.  There is no homicidality.  There is no suicidality.  There is no evidence of psychosis.    LABORATORY/RADIOLOGY: Chart reviewed    ASSESSMENT:   Acute low back pain with left-sided sciatica    PLAN:  NSAID as per med card  Pain clinic in a couple of weeks  Patient to call with failure to improve

## 2022-08-03 ENCOUNTER — TELEPHONE (OUTPATIENT)
Dept: ORTHOPEDICS | Facility: CLINIC | Age: 47
End: 2022-08-03

## 2022-08-08 NOTE — PROGRESS NOTES
"Patient was given vaccine information sheet for the Tdap immunization. The area of injection was palpated using the acromion process as a landmark. This area was cleaned with alcohol. Using a 25g 1" safety needle, 0.5mL of the vaccine was placed into the left muscle. The injection site was dressed with a bandage. Patient experienced no complications and was discharged in stable condition. Tdap Lot: [id number] Exp: [id number]    " Rifampin Pregnancy And Lactation Text: This medication is Pregnancy Category C and it isn't know if it is safe during pregnancy. It is also excreted in breast milk and should not be used if you are breast feeding.

## 2022-08-24 ENCOUNTER — PATIENT MESSAGE (OUTPATIENT)
Dept: ADMINISTRATIVE | Facility: HOSPITAL | Age: 47
End: 2022-08-24

## 2022-08-31 ENCOUNTER — PATIENT MESSAGE (OUTPATIENT)
Dept: DERMATOLOGY | Facility: CLINIC | Age: 47
End: 2022-08-31

## 2022-09-01 ENCOUNTER — OFFICE VISIT (OUTPATIENT)
Dept: INTERNAL MEDICINE | Facility: CLINIC | Age: 47
End: 2022-09-01
Payer: COMMERCIAL

## 2022-09-01 DIAGNOSIS — L98.9 SKIN LESIONS, GENERALIZED: Primary | ICD-10-CM

## 2022-09-01 PROCEDURE — 1159F MED LIST DOCD IN RCRD: CPT | Mod: CPTII,S$GLB,, | Performed by: INTERNAL MEDICINE

## 2022-09-01 PROCEDURE — 3008F PR BODY MASS INDEX (BMI) DOCUMENTED: ICD-10-PCS | Mod: CPTII,S$GLB,, | Performed by: INTERNAL MEDICINE

## 2022-09-01 PROCEDURE — 99999 PR PBB SHADOW E&M-EST. PATIENT-LVL III: ICD-10-PCS | Mod: PBBFAC,,, | Performed by: INTERNAL MEDICINE

## 2022-09-01 PROCEDURE — 87593 ORTHOPOXVIRUS AMP PRB EACH: CPT | Performed by: INTERNAL MEDICINE

## 2022-09-01 PROCEDURE — 1159F PR MEDICATION LIST DOCUMENTED IN MEDICAL RECORD: ICD-10-PCS | Mod: CPTII,S$GLB,, | Performed by: INTERNAL MEDICINE

## 2022-09-01 PROCEDURE — 3044F PR MOST RECENT HEMOGLOBIN A1C LEVEL <7.0%: ICD-10-PCS | Mod: CPTII,S$GLB,, | Performed by: INTERNAL MEDICINE

## 2022-09-01 PROCEDURE — 1160F PR REVIEW ALL MEDS BY PRESCRIBER/CLIN PHARMACIST DOCUMENTED: ICD-10-PCS | Mod: CPTII,S$GLB,, | Performed by: INTERNAL MEDICINE

## 2022-09-01 PROCEDURE — 3044F HG A1C LEVEL LT 7.0%: CPT | Mod: CPTII,S$GLB,, | Performed by: INTERNAL MEDICINE

## 2022-09-01 PROCEDURE — 99999 PR PBB SHADOW E&M-EST. PATIENT-LVL III: CPT | Mod: PBBFAC,,, | Performed by: INTERNAL MEDICINE

## 2022-09-01 PROCEDURE — 3008F BODY MASS INDEX DOCD: CPT | Mod: CPTII,S$GLB,, | Performed by: INTERNAL MEDICINE

## 2022-09-01 PROCEDURE — 99213 OFFICE O/P EST LOW 20 MIN: CPT | Mod: S$GLB,,, | Performed by: INTERNAL MEDICINE

## 2022-09-01 PROCEDURE — 99213 PR OFFICE/OUTPT VISIT, EST, LEVL III, 20-29 MIN: ICD-10-PCS | Mod: S$GLB,,, | Performed by: INTERNAL MEDICINE

## 2022-09-01 PROCEDURE — 1160F RVW MEDS BY RX/DR IN RCRD: CPT | Mod: CPTII,S$GLB,, | Performed by: INTERNAL MEDICINE

## 2022-09-01 RX ORDER — TRIAMCINOLONE ACETONIDE 1 MG/G
CREAM TOPICAL 2 TIMES DAILY
Qty: 45 G | Refills: 0 | Status: SHIPPED | OUTPATIENT
Start: 2022-09-01 | End: 2023-05-09

## 2022-09-01 NOTE — PROGRESS NOTES
INTERNAL MEDICINE SAME DAY PRIMARY CARE VISIT NOTE    Subjective:     Chief Complaint: Rash       Patient ID: Karla Washington is a 47 y.o. female with hx abnormal pap c HPV but last pap 5/2021 wnl and neg HPV, no other medical issues, pt of Dr. Geovani Gomez, here today for focused same-day primary care visit.    Today, patient with complaint of rash.  States sx started 5 days ago.  No painful or pruritic.  Denies other associated sx such as fever, sore throat, myalgias or fatigue.  No changes in soaps/detergents.  No recent med changes or diet changes.  , monogamous.  No direct contacts with anyone with rash or monkey pox.  Had chicken pox in childhood.    Past Medical History:  Past Medical History:   Diagnosis Date    Abnormal Pap smear of cervix     Angio-edema     Fibroid of cervix     History of colposcopy 1997    x2   1997, 2011       Home Medications:  Prior to Admission medications    Medication Sig Start Date End Date Taking? Authorizing Provider   b complex vitamins tablet Take 1 tablet by mouth once daily.   Yes Historical Provider   clobetasoL (TEMOVATE) 0.05 % cream Apply BID 2-4 weeks only unless already discussed 7/11/22  Yes Geovani Gomez MD   diclofenac (VOLTAREN) 50 MG EC tablet Take 1 tablet (50 mg total) by mouth 2 (two) times daily. 8/2/22  Yes Dale Nash MD   Lactobacillus acidophilus (PROBIOTIC) 10 billion cell Cap  1/4/22  Yes Historical Provider   magnesium citrate 100 mg Tab  1/4/22  Yes Historical Provider   sars-cov-2, covid-19, (MODERNA COVID-19) 100 mcg/0.5 ml injection  12/20/21   Historical Provider   triamcinolone acetonide 0.1% (KENALOG) 0.1 % cream Apply topically 2 (two) times daily. 9/1/22   Isabela Candelario MD       Allergies:  Review of patient's allergies indicates:   Allergen Reactions    Pcn [penicillins] Hives       Social History:  Social History     Tobacco Use    Smoking status: Never    Smokeless tobacco: Never   Substance Use Topics    Alcohol  "use: Not Currently     Alcohol/week: 6.0 standard drinks     Types: 6 Glasses of wine per week    Drug use: No         Review of Systems   Constitutional:  Negative for chills, fatigue and fever.   HENT:  Negative for congestion, mouth sores, postnasal drip, rhinorrhea and sore throat.    Respiratory:  Negative for cough, chest tightness and shortness of breath.    Cardiovascular:  Negative for chest pain.   Gastrointestinal:  Negative for abdominal pain and blood in stool.   Genitourinary:  Negative for dysuria and frequency.         Objective:   Ht 5' 6" (1.676 m)   Wt 68 kg (149 lb 14.6 oz)   BMI 24.20 kg/m²        General: AAO x3, no apparent distress  CV: RRR, no m/r/g  Pulm: Lungs CTAB, no crackles, no wheezes  Skin: mult papules noted on neck, torso, arms, and legs, erythematous base, no blistering appreciated.  Non-tender.    Labs:         Assessment/Plan     Karla was seen today for rash.    Diagnoses and all orders for this visit:    Skin lesions, generalized  Pt overall low risk but lesions suspicious.  Swabbed for monkeypox and sent for PCR.  Will give topical triamcinolone pending results.  If negative and rash persists, may need f/u c Derm.  -     Monkeypox (Orthopoxvirus), PCR  -     triamcinolone acetonide 0.1% (KENALOG) 0.1 % cream; Apply topically 2 (two) times daily.      RTC prn and with PCP as per routine.    Isabela Candelario MD  Department of Internal Medicine - [facility]  09/02/2022      "

## 2022-09-02 VITALS
HEART RATE: 82 BPM | DIASTOLIC BLOOD PRESSURE: 77 MMHG | BODY MASS INDEX: 24.1 KG/M2 | HEIGHT: 66 IN | WEIGHT: 149.94 LBS | SYSTOLIC BLOOD PRESSURE: 120 MMHG | OXYGEN SATURATION: 99 %

## 2022-09-03 ENCOUNTER — NURSE TRIAGE (OUTPATIENT)
Dept: ADMINISTRATIVE | Facility: CLINIC | Age: 47
End: 2022-09-03
Payer: COMMERCIAL

## 2022-09-03 ENCOUNTER — HOSPITAL ENCOUNTER (EMERGENCY)
Facility: HOSPITAL | Age: 47
Discharge: HOME | End: 2022-09-03
Attending: EMERGENCY MEDICINE
Payer: COMMERCIAL

## 2022-09-03 VITALS
OXYGEN SATURATION: 97 % | HEIGHT: 66 IN | TEMPERATURE: 98 F | WEIGHT: 149.94 LBS | RESPIRATION RATE: 18 BRPM | HEART RATE: 97 BPM | BODY MASS INDEX: 24.1 KG/M2 | SYSTOLIC BLOOD PRESSURE: 160 MMHG | DIASTOLIC BLOOD PRESSURE: 65 MMHG

## 2022-09-03 DIAGNOSIS — R21 RASH OF ENTIRE BODY: ICD-10-CM

## 2022-09-03 DIAGNOSIS — H00.022 HORDEOLUM INTERNUM OF RIGHT LOWER EYELID: Primary | ICD-10-CM

## 2022-09-03 PROCEDURE — 99284 EMERGENCY DEPT VISIT MOD MDM: CPT | Mod: ,,, | Performed by: EMERGENCY MEDICINE

## 2022-09-03 PROCEDURE — 99282 EMERGENCY DEPT VISIT SF MDM: CPT

## 2022-09-03 PROCEDURE — 99284 PR EMERGENCY DEPT VISIT,LEVEL IV: ICD-10-PCS | Mod: ,,, | Performed by: EMERGENCY MEDICINE

## 2022-09-03 NOTE — ED NOTES
Called patient at home regarding follow up with Opthalmology, offered written information, states she will get if off [facility].

## 2022-09-03 NOTE — ED PROVIDER NOTES
Encounter Date: 9/3/2022       History     Chief Complaint   Patient presents with    Possible Monkeypox     Tested for monkeypox on Thursday, does not have results back yet. Now with bump on right corner of eye. Concerned that it is so close to eye, so decided to come to er      47-year-old female who was recently tested for mokeypox this past Thursday presents with a chief complaint of a lesion in her right eye.  She said that she 1st noticed the lesion today, and that it has been causing her some mild discomfort.  She said that she would an article that says that monkeypox lesions can occur in the eye.  She denies any vision changes or drainage from her right eye.  She also denies any fevers, cough, shortness of breath, chest pain, fatigue.    Review of patient's allergies indicates:   Allergen Reactions    Pcn [penicillins] Hives     Past Medical History:   Diagnosis Date    Abnormal Pap smear of cervix     Angio-edema     Fibroid of cervix     History of colposcopy 1997    x2   1997, 2011     Past Surgical History:   Procedure Laterality Date    TONSILLECTOMY       Family History   Problem Relation Age of Onset    Thyroid disease Mother     Melanoma Mother     Alcohol abuse Father     Alcohol abuse Sister     Alcohol abuse Brother     Alcohol abuse Maternal Aunt     Thyroid disease Sister     Thyroid disease Paternal Grandmother     Macular degeneration Paternal Grandmother     Colon cancer Maternal Grandmother     Glaucoma Maternal Grandmother     Breast cancer Paternal Aunt     Bladder Cancer Maternal Grandfather         metastasized to his breast    Colon cancer Maternal Grandfather     Ovarian cancer Neg Hx     Blindness Neg Hx     Retinal detachment Neg Hx      Social History     Tobacco Use    Smoking status: Never    Smokeless tobacco: Never   Substance Use Topics    Alcohol use: Not Currently     Alcohol/week: 6.0 standard drinks     Types: 6 Glasses of wine per week    Drug use: No     Review of  Systems   Constitutional:  Negative for chills and fever.   HENT:  Negative for congestion and sore throat.    Eyes:  Negative for photophobia, pain, discharge, redness and visual disturbance.   Respiratory:  Negative for cough and shortness of breath.    Cardiovascular:  Negative for chest pain and leg swelling.   Gastrointestinal:  Negative for abdominal pain.   Genitourinary:  Negative for difficulty urinating and dysuria.   Musculoskeletal:  Negative for back pain and neck pain.   Skin:  Negative for pallor and rash.   Neurological:  Negative for dizziness and headaches.   Psychiatric/Behavioral:  Negative for confusion and dysphoric mood.      Physical Exam     Initial Vitals [09/03/22 1441]   BP Pulse Resp Temp SpO2   (!) 160/65 97 18 98.2 °F (36.8 °C) 97 %      MAP       --         Physical Exam    Nursing note and vitals reviewed.  Constitutional: She appears well-developed and well-nourished.   HENT:   Head: Normocephalic.   Eyes: EOM are normal. Pupils are equal, round, and reactive to light. Right eye exhibits no discharge. Left eye exhibits no discharge. No scleral icterus.   There is a small 1 mm yellow lesion on the outer aspect of the lower conjunctiva in the right eye; see picture below   Neck:   Normal range of motion.  Cardiovascular:  Normal rate, regular rhythm, normal heart sounds and intact distal pulses.           Pulmonary/Chest: Breath sounds normal. She has no wheezes. She has no rhonchi. She has no rales.   Abdominal: Abdomen is soft. Bowel sounds are normal. There is no abdominal tenderness. There is no rebound and no guarding.   Musculoskeletal:         General: No tenderness or edema. Normal range of motion.      Cervical back: Normal range of motion.     Neurological: She is alert and oriented to person, place, and time. She has normal strength. GCS score is 15. GCS eye subscore is 4. GCS verbal subscore is 5. GCS motor subscore is 6.   Skin: Skin is warm and dry. Capillary refill  takes less than 2 seconds. Rash noted. No erythema. No pallor.   There are diffuse 1 cm erythematous macules on the chest back and extremities.  Palms and soles are spared.   Psychiatric: She has a normal mood and affect. Her behavior is normal. Judgment and thought content normal.             ED Course   Procedures  Labs Reviewed - No data to display       Imaging Results    None          Medications - No data to display  Medical Decision Making:   History:   Old Medical Records: I decided to obtain old medical records.  Old Records Summarized: records from clinic visits and records from previous admission(s).       <> Summary of Records: Prior records reviewed for current medications and past medical history  Initial Assessment:   47-year-old female with diffuse macular rash sparing the palms and the soles with a lesion on the conjunctiva most concerning for early stye formation or xanthogranuloma.  The patient denies any fever or constitutional symptoms, she is also denying any vision changes or discharge from the eye.    Ddx: monkeypox lesion, stye, blepharitis, conjunctivitis, xanthogranuloma  ED Management:  Based on the time course of the patient's lesions, monkey pox seems unlikely.  The patient has diffuse lesions in different stages of evolution and without the characteristic umbilicated vesicular appearance.  The patient also lacks any high-risk exposures, she reports a monogamous partner.  I offered that we could test for other causes of her rash such as syphilis but the patient declined.    As for the lesion and her conjunctiva, there are no concerning signs like changes in vision, drainage, edema or erythema.  I discussed with the patient that if her symptoms of discomfort get worse or she has any changes in vision, she can reach out to her primary care physician or return to the emergency department.  I also stressed the importance of her to continuing isolation until the results of her monkey box  test returns negative, and if it does, then she should coordinate follow-up with Dermatology through her primary care physician.  Answered all of her questions and she was discharged home.          Attending Attestation:   Physician Attestation Statement for Resident:  As the supervising MD   Physician Attestation Statement: I have personally seen and examined this patient.   I agree with the above history.  -:   As the supervising MD I agree with the above PE.     As the supervising MD I agree with the above treatment, course, plan, and disposition.   -: Tiny amaral on right inner lower lid, appears more consistent with stye, no eye pain or vision changes, no purulent drainage concerning for bacterial infection. Advised to continue with warm compresses and hygiene and f/u Ophtho. Stable for d/c, I discussed outpatient follow up and return precautions with pt and answered all questions.                            Clinical Impression:   Final diagnoses:  [H00.022] Hordeolum internum of right lower eyelid (Primary)  [R21] Rash of entire body      ED Disposition Condition    Discharge Stable          ED Prescriptions    None       Follow-up Information       Follow up With Specialties Details Why Contact Info Additional Information    Geovani Gomez MD Internal Medicine  As needed, If symptoms worsen [address] [facility]  [address]  [facility] [address]  [phone]       [address] Ophthalmology Schedule an appointment as soon as possible for a visit  As needed [address] [facility] [address] [phone] Please arrive on the 10th floor for check-in.             Lavon Chino MD  Resident  09/04/22 0012       Pippa Nieto MD  09/04/22 5968

## 2022-09-03 NOTE — DISCHARGE INSTRUCTIONS
As we discussed, the lesion in your right eye is likely a stye. I recommend applying warm compresses as needed for discomfort. You may also take tylenol as well.    Please come back to the ER if you experience fevers or vision changes.    It is important that you continue to isolate while you are waiting for the results of your monkeypox test.

## 2022-09-03 NOTE — ED NOTES
Patient identifiers verified and correct for Ms Washington  C/C: Eye issue SEE NN  APPEARANCE: awake and alert in NAD.  SKIN: warm, dry and intact. No breakdown or bruising.  MUSCULOSKELETAL: Patient moving all extremities spontaneously, no obvious swelling or deformities noted. Ambulates independently.  RESPIRATORY: Denies shortness of breath.Respirations unlabored.   CARDIAC: Denies CP, 2+ distal pulses; no peripheral edema  ABDOMEN: S/ND/NT, Denies nausea  : voids spontaneously, denies difficulty  Neurologic: AAO x 4; follows commands equal strength in all extremities; denies numbness/tingling. Denies dizziness Denies weakness, denies headaches

## 2022-09-03 NOTE — ED NOTES
Patient noted small area to bottom right eye this am, has been tested for monkeypox with rash noted to chest, head, arms, itching , reports onset rash last week

## 2022-09-03 NOTE — TELEPHONE ENCOUNTER
"  Reason for Disposition   Eye pain or blurred vision    Additional Information   Negative: SEVERE difficulty breathing (e.g., struggling for each breath, speaks in single words)   Negative: Difficult to awaken or acting confused (e.g., disoriented, slurred speech)   Negative: Sounds like a life-threatening emergency to the triager   Negative: Difficulty breathing   Negative: Fever > 103 F (39.4 C)   Negative: Bright red skin or red streak    Protocols used: Monkeypox - Diagnosed or [id number]        Karla states she was seen in clinic for a rash with blisters that she thinks is monkey pox.  Tested for same on 09/01/2022.   She does not have results yet, but now has new blisters, on on lash line of right lower lid, and the one on the left is on the eyelid, just below the eyebrow.  Mild pain to right eye, and worse pain to right elbow. Eyelid red, tender. She now reports blisters "everywhere but my genitals"  she says about 70 blisters, and more forming.  Per [facility] triage protocol for blisters at / near eye, recommend she be seen now, and [place] [facility] [place] [address]  She will mask, cover blisters with clothing, and assured her this call will be in her chart.  Call placed to ZACKERY Valle charge in ED to notify that she is coming by personal vehicle; brief report given to him.  Message to Geovani Gomez MD, pcp.  Please contact caller directly with any additional care advice.     "

## 2022-09-06 ENCOUNTER — PATIENT MESSAGE (OUTPATIENT)
Dept: INTERNAL MEDICINE | Facility: CLINIC | Age: 47
End: 2022-09-06
Payer: COMMERCIAL

## 2022-09-06 DIAGNOSIS — G47.00 INSOMNIA, UNSPECIFIED TYPE: Primary | ICD-10-CM

## 2022-09-06 RX ORDER — HYDROXYZINE HYDROCHLORIDE 50 MG/1
50 TABLET, FILM COATED ORAL NIGHTLY PRN
Qty: 30 TABLET | Refills: 0 | Status: SHIPPED | OUTPATIENT
Start: 2022-09-06 | End: 2023-05-09

## 2022-09-06 NOTE — TELEPHONE ENCOUNTER
09/06 0820 Called to check on Ms. Washington. States her right eye still hurts and is at a level 6. States the rash is about the same and that she did go to the ER this weekend, and they didn't do too much; they are awaiting test results .

## 2022-09-07 ENCOUNTER — PATIENT MESSAGE (OUTPATIENT)
Dept: INTERNAL MEDICINE | Facility: CLINIC | Age: 47
End: 2022-09-07
Payer: COMMERCIAL

## 2022-09-07 LAB — NONVAR ORTHPX DNA SPEC QL NAA+PROBE: NORMAL

## 2022-09-08 ENCOUNTER — OFFICE VISIT (OUTPATIENT)
Dept: DERMATOLOGY | Facility: CLINIC | Age: 47
End: 2022-09-08
Payer: COMMERCIAL

## 2022-09-08 ENCOUNTER — PATIENT MESSAGE (OUTPATIENT)
Dept: DERMATOLOGY | Facility: CLINIC | Age: 47
End: 2022-09-08
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
## 2022-09-08 PROCEDURE — 99204 PR OFFICE/OUTPT VISIT, NEW, LEVL IV, 45-59 MIN: ICD-10-PCS | Mod: S$GLB,,, | Performed by: DERMATOLOGY

## 2022-09-08 PROCEDURE — 99204 OFFICE O/P NEW MOD 45 MIN: CPT | Mod: S$GLB,,, | Performed by: DERMATOLOGY

## 2022-09-08 PROCEDURE — 99999 PR PBB SHADOW E&M-EST. PATIENT-LVL III: CPT | Mod: PBBFAC,,, | Performed by: DERMATOLOGY

## 2022-09-08 PROCEDURE — 99999 PR PBB SHADOW E&M-EST. PATIENT-LVL III: ICD-10-PCS | Mod: PBBFAC,,, | Performed by: DERMATOLOGY

## 2022-09-08 NOTE — PROGRESS NOTES
Subjective:       Patient ID:  Karla Washington is a 47 y.o. female who presents for   Chief Complaint   Patient presents with    Rash     Rash - Initial  Affected locations: right arm, left arm, back, abdomen, chest, torso, right upper leg, left upper leg, right lower leg, left lower leg and face  Duration: 2 months  Signs / symptoms: itching and spreading  Severity: mild to moderate  Timing: constant    Review of Systems   Constitutional: Negative.    HENT: Negative.     Respiratory: Negative.     Musculoskeletal: Negative.    Skin:  Positive for itching (mild) and rash.      Objective:    Physical Exam   Constitutional: She appears well-developed and well-nourished.   Eyes: No conjunctival no injection.   Neurological: She is alert and oriented to person, place, and time.   Psychiatric: She has a normal mood and affect.   Skin:                    Diagram Legend     Erythematous scaling macule/papule c/w actinic keratosis       Vascular papule c/w angioma      Pigmented verrucoid papule/plaque c/w seborrheic keratosis      Yellow umbilicated papule c/w sebaceous hyperplasia      Irregularly shaped tan macule c/w lentigo     1-2 mm smooth white papules consistent with Milia      Movable subcutaneous cyst with punctum c/w epidermal inclusion cyst      Subcutaneous movable cyst c/w pilar cyst      Firm pink to brown papule c/w dermatofibroma      Pedunculated fleshy papule(s) c/w skin tag(s)      Evenly pigmented macule c/w junctional nevus     Mildly variegated pigmented, slightly irregular-bordered macule c/w mildly atypical nevus      Flesh colored to evenly pigmented papule c/w intradermal nevus       Pink pearly papule/plaque c/w basal cell carcinoma      Erythematous hyperkeratotic cursted plaque c/w SCC      Surgical scar with no sign of skin cancer recurrence      Open and closed comedones      Inflammatory papules and pustules      Verrucoid papule consistent consistent with wart     Erythematous eczematous  patches and plaques     Dystrophic onycholytic nail with subungual debris c/w onychomycosis     Umbilicated papule    Erythematous-base heme-crusted tan verrucoid plaque consistent with inflamed seborrheic keratosis     Erythematous Silvery Scaling Plaque c/w Psoriasis     See annotation      Assessment / Plan:        Insect bite, unspecified site, initial encounter  Discussed with patient the etiology and pathogenesis of the disease or skin lesion(s) and possible treatments and aggravators.    Pt to check cat again for fleas and up flea prevention.  Can check with vet again.  Warned that [place] with signif flea problem.  Cont pt's tac cr prn.  Watch also for pet mites.  Can try otc clot vs terbenafine as pt worried about tinea.  She states cat has tinea.   Patient and or guardian to monitor this area/lesion or these areas/lesions for changes or worsening or darkening (for moles and freckles).  Patient and or guardian to contact us if any changes are noted for such.  Previous [facility] labs and or records and notes reviewed and considered for their impact on our clinical decision making today.        Follow up if symptoms worsen or fail to improve.

## 2022-09-08 NOTE — PATIENT INSTRUCTIONS
Avoid hot water     Treat pets with fleas     Apply triamcinolone as needed for itching     Apply Vaseline to scars

## 2022-09-19 ENCOUNTER — PATIENT MESSAGE (OUTPATIENT)
Dept: INTERNAL MEDICINE | Facility: CLINIC | Age: 47
End: 2022-09-19
Payer: COMMERCIAL

## 2022-09-19 ENCOUNTER — OFFICE VISIT (OUTPATIENT)
Dept: DERMATOLOGY | Facility: CLINIC | Age: 47
End: 2022-09-19
Payer: COMMERCIAL

## 2022-09-19 DIAGNOSIS — Q82.8 KERATODERMA: ICD-10-CM

## 2022-09-19 DIAGNOSIS — Z76.89 ENCOUNTER FOR SKIN CARE: Primary | ICD-10-CM

## 2022-09-19 PROCEDURE — 1160F PR REVIEW ALL MEDS BY PRESCRIBER/CLIN PHARMACIST DOCUMENTED: ICD-10-PCS | Mod: CPTII,S$GLB,, | Performed by: DERMATOLOGY

## 2022-09-19 PROCEDURE — 99213 PR OFFICE/OUTPT VISIT, EST, LEVL III, 20-29 MIN: ICD-10-PCS | Mod: S$GLB,,, | Performed by: DERMATOLOGY

## 2022-09-19 PROCEDURE — 99213 OFFICE O/P EST LOW 20 MIN: CPT | Mod: S$GLB,,, | Performed by: DERMATOLOGY

## 2022-09-19 PROCEDURE — 3044F HG A1C LEVEL LT 7.0%: CPT | Mod: CPTII,S$GLB,, | Performed by: DERMATOLOGY

## 2022-09-19 PROCEDURE — 1159F MED LIST DOCD IN RCRD: CPT | Mod: CPTII,S$GLB,, | Performed by: DERMATOLOGY

## 2022-09-19 PROCEDURE — 99999 PR PBB SHADOW E&M-EST. PATIENT-LVL III: ICD-10-PCS | Mod: PBBFAC,,, | Performed by: DERMATOLOGY

## 2022-09-19 PROCEDURE — 99999 PR PBB SHADOW E&M-EST. PATIENT-LVL III: CPT | Mod: PBBFAC,,, | Performed by: DERMATOLOGY

## 2022-09-19 PROCEDURE — 3044F PR MOST RECENT HEMOGLOBIN A1C LEVEL <7.0%: ICD-10-PCS | Mod: CPTII,S$GLB,, | Performed by: DERMATOLOGY

## 2022-09-19 PROCEDURE — 1159F PR MEDICATION LIST DOCUMENTED IN MEDICAL RECORD: ICD-10-PCS | Mod: CPTII,S$GLB,, | Performed by: DERMATOLOGY

## 2022-09-19 PROCEDURE — 1160F RVW MEDS BY RX/DR IN RCRD: CPT | Mod: CPTII,S$GLB,, | Performed by: DERMATOLOGY

## 2022-09-19 RX ORDER — AMMONIUM LACTATE 12 G/100G
LOTION TOPICAL 2 TIMES DAILY
Qty: 225 G | Refills: 3 | Status: SHIPPED | OUTPATIENT
Start: 2022-09-19

## 2022-09-19 NOTE — PATIENT INSTRUCTIONS
XEROSIS (DRY SKIN)        Definition    Xerosis is the term for dry skin.  We all have a natural oil coating over our skin produced by the skin oil glands.  If this oil is removed, the skin becomes dry which can lead to cracking, which can lead to inflammation.  Xerosis is usually a long-term problem that recurs often, especially in the winter.    Cause    Long hot baths or showers can remove our natural oil and lead to xerosis.  One should never take more than one bath or shower a day and for no longer than ten minutes.  Use of harsh soaps such as Zest, Dial, and Ivory can worsen and cause xerosis.  Cold winter weather worsens xerosis because the amount of moisture contained in cold air is much less than the amount of moisture in warm air.    Treatment    Treatment is intended to restore the natural oil to your skin.  Keep the skin lubricated.    Do not take more than one bath or shower a day.  Use lukewarm water, not hot.  Hot water dries out the skin.    Use a gentle moisturizing soap such as Cetaphil soap, Oil of Olay, Dove, Basis, Ivory moisture care, Restoraderm cleanser.    When toweling dry, dont rub.  Blot the skin so there is still some water left on the skin.  You should apply a moisturizing cream to all of the skin such as Cerave cream, Cetaphil cream, Lipikar Basalt AP+ Intense Repair Moisturizing Cream or Restoraderm or Eucerin Original Formula cream.   Alpha hydroxyacid lotions, i.e., AmLactin, also work very well for preventing dry skin, but may burn when used on inflamed or reddened skin.    If you like to swim during the winter months, you should not use soap when getting out of the pool.  When you have finished swimming, rinse off the chlorine with cool to warm water.  If this will be the only shower of the day, then you may use Cetaphil or another mild soap to cleanse your skin.  After the shower, apply a moisturizing cream to all of the skin as above.        [address] [place] [address]/ [phone] FAX/ [email or web address] Avoid hot water     Patient instructed to start Amlactin cream or lotion nightly to AK prone areas or other specified affected areas.  Warned of skin irritation and to decrease frequency of usage if this occurs.    Patient instructed to start Amlactin cream or lotion nightly to AK prone areas or other specified affected areas.  Warned of skin irritation and to decrease frequency of usage if this occurs.

## 2022-09-19 NOTE — PROGRESS NOTES
Subjective:       Patient ID:  Karla Washington is a 47 y.o. female who presents for   Chief Complaint   Patient presents with    Dry Skin     Heels of feet, X1yrs+, cracking, TC clobetasol      Dry Skin - Initial  Affected locations: right foot and left foot  Signs / symptoms: cracking  Severity: mild to moderate  Timing: constant      Review of Systems   Constitutional: Negative.    HENT: Negative.     Respiratory: Negative.     Musculoskeletal: Negative.    Skin:  Positive for dry skin.      Objective:    Physical Exam   Constitutional: She appears well-developed and well-nourished.   Eyes: No conjunctival no injection.   Cardiovascular:  There is no dependent edema.             Neurological: She is alert and oriented to person, place, and time. She is not disoriented.   Psychiatric: She has a normal mood and affect.   Skin:              Diagram Legend     Erythematous scaling macule/papule c/w actinic keratosis       Vascular papule c/w angioma      Pigmented verrucoid papule/plaque c/w seborrheic keratosis      Yellow umbilicated papule c/w sebaceous hyperplasia      Irregularly shaped tan macule c/w lentigo     1-2 mm smooth white papules consistent with Milia      Movable subcutaneous cyst with punctum c/w epidermal inclusion cyst      Subcutaneous movable cyst c/w pilar cyst      Firm pink to brown papule c/w dermatofibroma      Pedunculated fleshy papule(s) c/w skin tag(s)      Evenly pigmented macule c/w junctional nevus     Mildly variegated pigmented, slightly irregular-bordered macule c/w mildly atypical nevus      Flesh colored to evenly pigmented papule c/w intradermal nevus       Pink pearly papule/plaque c/w basal cell carcinoma      Erythematous hyperkeratotic cursted plaque c/w SCC      Surgical scar with no sign of skin cancer recurrence      Open and closed comedones      Inflammatory papules and pustules      Verrucoid papule consistent consistent with wart     Erythematous eczematous patches  and plaques     Dystrophic onycholytic nail with subungual debris c/w onychomycosis     Umbilicated papule    Erythematous-base heme-crusted tan verrucoid plaque consistent with inflamed seborrheic keratosis     Erythematous Silvery Scaling Plaque c/w Psoriasis     See annotation      Assessment / Plan:        Encounter for skin care  No hot water bathing reviewed.    Keratoderma  -     ammonium lactate (LAC-HYDRIN) 12 % lotion; Apply topically 2 (two) times daily. soles  Dispense: 225 g; Refill: 3  Discussed with patient the etiology and pathogenesis of the disease or skin lesion(s) and possible treatments and aggravators.    Reviewed with patient different treatment options and associated risks.    Proper application of medications and or care for affected area(s) and condition(s) reviewed.  Chronic nature of this condition discussed with patient.  Watch for callus pressure effect if doesn't improve.  May need podiatry vs PT.           Follow up if symptoms worsen or fail to improve.

## 2022-09-20 RX ORDER — EPINEPHRINE 0.3 MG/.3ML
1 INJECTION SUBCUTANEOUS ONCE
Qty: 0.6 ML | Refills: 5 | Status: SHIPPED | OUTPATIENT
Start: 2022-09-20 | End: 2024-02-05

## 2023-01-15 ENCOUNTER — PATIENT MESSAGE (OUTPATIENT)
Dept: ADMINISTRATIVE | Facility: HOSPITAL | Age: 48
End: 2023-01-15
Payer: COMMERCIAL

## 2023-01-15 DIAGNOSIS — Z12.11 SCREENING FOR COLON CANCER: ICD-10-CM

## 2023-01-18 ENCOUNTER — PATIENT MESSAGE (OUTPATIENT)
Dept: ADMINISTRATIVE | Facility: HOSPITAL | Age: 48
End: 2023-01-18
Payer: COMMERCIAL

## 2023-01-18 DIAGNOSIS — Z12.11 COLON CANCER SCREENING: ICD-10-CM

## 2023-01-27 ENCOUNTER — PATIENT MESSAGE (OUTPATIENT)
Dept: RESEARCH | Facility: HOSPITAL | Age: 48
End: 2023-01-27
Payer: COMMERCIAL

## 2023-03-02 ENCOUNTER — RESEARCH ENCOUNTER (OUTPATIENT)
Dept: RESEARCH | Facility: HOSPITAL | Age: 48
End: 2023-03-02
Payer: COMMERCIAL

## 2023-03-02 NOTE — PROGRESS NOTES
Study title: [facility]  IRB #: [id number]  IRB approval date: 1/19/2022       Patient called to discuss participation into the [facility] study. Explained overview of study. Patient expressed interest and is willing to see us on  3/14 @ 10 a Patient voiced understanding.  Reminder MyChart message will be sent about appointment.

## 2023-03-13 ENCOUNTER — PATIENT MESSAGE (OUTPATIENT)
Dept: RESEARCH | Facility: HOSPITAL | Age: 48
End: 2023-03-13
Payer: COMMERCIAL

## 2023-03-14 ENCOUNTER — PATIENT MESSAGE (OUTPATIENT)
Dept: RESEARCH | Facility: HOSPITAL | Age: 48
End: 2023-03-14
Payer: COMMERCIAL

## 2023-03-14 ENCOUNTER — RESEARCH ENCOUNTER (OUTPATIENT)
Dept: HEMATOLOGY/ONCOLOGY | Facility: CLINIC | Age: 48
End: 2023-03-14
Payer: COMMERCIAL

## 2023-03-14 NOTE — RESEARCH
Met with patient at the [facility] regarding participation in IRB protocol #2022.001, [facility] [employer] study. Pt was agreeable.    The Informed Consent Form (ICF) was reviewed with pt. The discussion included:  - participation is voluntary  - pt can change their mind about participating  - pt was informed that participation in this study would not preclude them from participating in any other research if offered  - specimens will be used by [facility] researchers in this study  - specimens collected (blood, saliva, nasal swab, urine, stool) include only those discussed with the patient at the time of consent and are indicated on the ICF   - specimens will be used in a multi-omics study regarding Covid-19 severity and biomarkers that are involved  - all specimens released to researchers will be stripped of identifiers  - no personal information will be released to any parties outside of this research study  - there will be no other physical risks outside of those involved in standard of care procedure.     Pt had a chance to ask any questions, and all questions were answered. Pt willingly and independently signed ICF.    A copy of ICF was given to pt with instructions to call with any questions that may arise or if they should change their mind regarding participation in [facility] study.    Four questionnaires and two take home sample collection kits were provided to the pt at the end of this encounter. The patient was instructed on how to collect and return these study items. Pt was then informed of a $50 stipend that would be offered to them upon return of these questionnaires and samples.    The following specimens were collected from the pt at the time of this encounter: blood, saliva, nasal swab

## 2023-03-29 ENCOUNTER — LAB VISIT (OUTPATIENT)
Dept: LAB | Facility: HOSPITAL | Age: 48
End: 2023-03-29
Attending: FAMILY MEDICINE
Payer: COMMERCIAL

## 2023-03-29 ENCOUNTER — OFFICE VISIT (OUTPATIENT)
Dept: FAMILY MEDICINE | Facility: CLINIC | Age: 48
End: 2023-03-29
Attending: FAMILY MEDICINE
Payer: COMMERCIAL

## 2023-03-29 VITALS — BODY MASS INDEX: 25.71 KG/M2 | WEIGHT: 160 LBS | HEIGHT: 66 IN

## 2023-03-29 DIAGNOSIS — N92.6 MISSED PERIODS: Primary | ICD-10-CM

## 2023-03-29 DIAGNOSIS — Z12.11 SCREENING FOR COLORECTAL CANCER: ICD-10-CM

## 2023-03-29 DIAGNOSIS — Z12.12 SCREENING FOR COLORECTAL CANCER: ICD-10-CM

## 2023-03-29 DIAGNOSIS — R11.0 NAUSEA: ICD-10-CM

## 2023-03-29 DIAGNOSIS — K21.9 GASTROESOPHAGEAL REFLUX DISEASE, UNSPECIFIED WHETHER ESOPHAGITIS PRESENT: ICD-10-CM

## 2023-03-29 LAB
ALBUMIN SERPL BCP-MCNC: 4.2 G/DL (ref 3.5–5.2)
ALP SERPL-CCNC: 42 U/L (ref 55–135)
ALT SERPL W/O P-5'-P-CCNC: 13 U/L (ref 10–44)
ANION GAP SERPL CALC-SCNC: 8 MMOL/L (ref 8–16)
AST SERPL-CCNC: 19 U/L (ref 10–40)
BILIRUB SERPL-MCNC: 1.9 MG/DL (ref 0.1–1)
BUN SERPL-MCNC: 11 MG/DL (ref 6–20)
CALCIUM SERPL-MCNC: 9.2 MG/DL (ref 8.7–10.5)
CHLORIDE SERPL-SCNC: 101 MMOL/L (ref 95–110)
CO2 SERPL-SCNC: 28 MMOL/L (ref 23–29)
CREAT SERPL-MCNC: 0.8 MG/DL (ref 0.5–1.4)
ERYTHROCYTE [DISTWIDTH] IN BLOOD BY AUTOMATED COUNT: 12.6 % (ref 11.5–14.5)
EST. GFR  (NO RACE VARIABLE): >60 ML/MIN/1.73 M^2
GLUCOSE SERPL-MCNC: 88 MG/DL (ref 70–110)
HCT VFR BLD AUTO: 45 % (ref 37–48.5)
HGB BLD-MCNC: 14.3 G/DL (ref 12–16)
MCH RBC QN AUTO: 28.5 PG (ref 27–31)
MCHC RBC AUTO-ENTMCNC: 31.8 G/DL (ref 32–36)
MCV RBC AUTO: 90 FL (ref 82–98)
PLATELET # BLD AUTO: 228 K/UL (ref 150–450)
PMV BLD AUTO: 11.7 FL (ref 9.2–12.9)
POTASSIUM SERPL-SCNC: 3.5 MMOL/L (ref 3.5–5.1)
PROT SERPL-MCNC: 7.2 G/DL (ref 6–8.4)
RBC # BLD AUTO: 5.01 M/UL (ref 4–5.4)
SODIUM SERPL-SCNC: 137 MMOL/L (ref 136–145)
WBC # BLD AUTO: 6.06 K/UL (ref 3.9–12.7)

## 2023-03-29 PROCEDURE — 85027 COMPLETE CBC AUTOMATED: CPT | Performed by: FAMILY MEDICINE

## 2023-03-29 PROCEDURE — 99214 PR OFFICE/OUTPT VISIT, EST, LEVL IV, 30-39 MIN: ICD-10-PCS | Mod: 95,,, | Performed by: FAMILY MEDICINE

## 2023-03-29 PROCEDURE — 1160F PR REVIEW ALL MEDS BY PRESCRIBER/CLIN PHARMACIST DOCUMENTED: ICD-10-PCS | Mod: CPTII,95,, | Performed by: FAMILY MEDICINE

## 2023-03-29 PROCEDURE — 99214 OFFICE O/P EST MOD 30 MIN: CPT | Mod: 95,,, | Performed by: FAMILY MEDICINE

## 2023-03-29 PROCEDURE — 1159F PR MEDICATION LIST DOCUMENTED IN MEDICAL RECORD: ICD-10-PCS | Mod: CPTII,95,, | Performed by: FAMILY MEDICINE

## 2023-03-29 PROCEDURE — 3008F PR BODY MASS INDEX (BMI) DOCUMENTED: ICD-10-PCS | Mod: CPTII,95,, | Performed by: FAMILY MEDICINE

## 2023-03-29 PROCEDURE — 1159F MED LIST DOCD IN RCRD: CPT | Mod: CPTII,95,, | Performed by: FAMILY MEDICINE

## 2023-03-29 PROCEDURE — 80053 COMPREHEN METABOLIC PANEL: CPT | Performed by: FAMILY MEDICINE

## 2023-03-29 PROCEDURE — 86677 HELICOBACTER PYLORI ANTIBODY: CPT | Performed by: FAMILY MEDICINE

## 2023-03-29 PROCEDURE — 1160F RVW MEDS BY RX/DR IN RCRD: CPT | Mod: CPTII,95,, | Performed by: FAMILY MEDICINE

## 2023-03-29 PROCEDURE — 36415 COLL VENOUS BLD VENIPUNCTURE: CPT | Mod: PO | Performed by: FAMILY MEDICINE

## 2023-03-29 PROCEDURE — 3008F BODY MASS INDEX DOCD: CPT | Mod: CPTII,95,, | Performed by: FAMILY MEDICINE

## 2023-03-29 NOTE — PROGRESS NOTES
The patient location is: home  The chief complaint leading to consultation is: nausea    Visit type: audiovisual    Face to Face time with patient: 20  30 minutes of total time spent on the encounter, which includes face to face time and non-face to face time preparing to see the patient (eg, review of tests), Obtaining and/or reviewing separately obtained history, Documenting clinical information in the electronic or other health record, Independently interpreting results (not separately reported) and communicating results to the patient/family/caregiver, or Care coordination (not separately reported).         Each patient to whom he or she provides medical services by telemedicine is:  (1) informed of the relationship between the physician and patient and the respective role of any other health care provider with respect to management of the patient; and (2) notified that he or she may decline to receive medical services by telemedicine and may withdraw from such care at any time.    Notes:   Subjective:       Patient ID: Karla Washington is a 48 y.o. female.    Chief Complaint: Nausea    Nausea  Associated symptoms include nausea and vomiting. Pertinent negatives include no abdominal pain, arthralgias, chest pain, chills, fatigue, fever, headaches, joint swelling, neck pain or weakness.   Pt is in virtual visit for c/o daily nausea over a week she has gerd does not take meds she diet changes have improved her symptoms she denies sob/cp she is not sure if it is diet related no acute abd pain  Pt has not had a period since December however pt did not consider this as she has been told with single f tube it is unlikely she would conceive.  Pos nausea no vomiting no vaginal bleeding  Review of Systems   Constitutional:  Negative for activity change, chills, fatigue, fever and unexpected weight change.   HENT:  Negative for hearing loss, rhinorrhea and trouble swallowing.    Eyes:  Negative for discharge and visual  "disturbance.   Respiratory:  Negative for chest tightness and wheezing.    Cardiovascular:  Negative for chest pain and palpitations.   Gastrointestinal:  Positive for nausea and vomiting. Negative for abdominal distention, abdominal pain, blood in stool, constipation and diarrhea.   Endocrine: Negative for polydipsia and polyuria.   Genitourinary:  Positive for menstrual problem. Negative for difficulty urinating, dysuria, hematuria and vaginal bleeding.   Musculoskeletal:  Negative for arthralgias, joint swelling and neck pain.   Neurological:  Negative for weakness and headaches.   Psychiatric/Behavioral:  Negative for confusion and dysphoric mood.      Objective:    Ht 5' 6" (1.676 m)   Wt 72.6 kg (160 lb)   BMI 25.82 kg/m²     Physical Exam  Constitutional:       Appearance: Normal appearance. She is not ill-appearing.   HENT:      Head: Normocephalic and atraumatic.   Pulmonary:      Effort: Pulmonary effort is normal. No respiratory distress.   Neurological:      General: No focal deficit present.      Mental Status: She is alert and oriented to person, place, and time.      Coordination: Coordination normal.   Psychiatric:         Mood and Affect: Mood normal.         Behavior: Behavior normal.         Thought Content: Thought content normal.         Judgment: Judgment normal.     T bili 1.6 in 7/2022  Assessment:       1. Missed periods    2. Nausea    3. Gastroesophageal reflux disease, unspecified whether esophagitis present    4. Screening for colorectal cancer          Plan:     Orders upt, cmp cbc hg pylori  Consider restart ppi  F/u gi  F/u ob/gyn if pos upt  F/u pcp          "This note will not be shared with the patient."     "

## 2023-03-30 LAB — H PYLORI IGG SERPL QL IA: NEGATIVE

## 2023-04-08 ENCOUNTER — PATIENT MESSAGE (OUTPATIENT)
Dept: FAMILY MEDICINE | Facility: CLINIC | Age: 48
End: 2023-04-08
Payer: COMMERCIAL

## 2023-04-17 ENCOUNTER — PATIENT OUTREACH (OUTPATIENT)
Dept: ADMINISTRATIVE | Facility: HOSPITAL | Age: 48
End: 2023-04-17
Payer: COMMERCIAL

## 2023-04-17 ENCOUNTER — PATIENT MESSAGE (OUTPATIENT)
Dept: ADMINISTRATIVE | Facility: HOSPITAL | Age: 48
End: 2023-04-17
Payer: COMMERCIAL

## 2023-04-17 DIAGNOSIS — Z12.31 ENCOUNTER FOR SCREENING MAMMOGRAM FOR BREAST CANCER: Primary | ICD-10-CM

## 2023-04-26 LAB — NONINV COLON CA DNA+OCC BLD SCRN STL QL: NEGATIVE

## 2023-05-09 ENCOUNTER — OFFICE VISIT (OUTPATIENT)
Dept: GASTROENTEROLOGY | Facility: CLINIC | Age: 48
End: 2023-05-09
Payer: COMMERCIAL

## 2023-05-09 VITALS — WEIGHT: 163.81 LBS | BODY MASS INDEX: 26.33 KG/M2 | HEIGHT: 66 IN

## 2023-05-09 DIAGNOSIS — R11.0 NAUSEA: ICD-10-CM

## 2023-05-09 DIAGNOSIS — K21.9 GASTROESOPHAGEAL REFLUX DISEASE, UNSPECIFIED WHETHER ESOPHAGITIS PRESENT: ICD-10-CM

## 2023-05-09 PROCEDURE — 3008F PR BODY MASS INDEX (BMI) DOCUMENTED: ICD-10-PCS | Mod: CPTII,S$GLB,, | Performed by: INTERNAL MEDICINE

## 2023-05-09 PROCEDURE — 1159F MED LIST DOCD IN RCRD: CPT | Mod: CPTII,S$GLB,, | Performed by: INTERNAL MEDICINE

## 2023-05-09 PROCEDURE — 99999 PR PBB SHADOW E&M-EST. PATIENT-LVL IV: CPT | Mod: PBBFAC,,, | Performed by: INTERNAL MEDICINE

## 2023-05-09 PROCEDURE — 3008F BODY MASS INDEX DOCD: CPT | Mod: CPTII,S$GLB,, | Performed by: INTERNAL MEDICINE

## 2023-05-09 PROCEDURE — 99999 PR PBB SHADOW E&M-EST. PATIENT-LVL IV: ICD-10-PCS | Mod: PBBFAC,,, | Performed by: INTERNAL MEDICINE

## 2023-05-09 PROCEDURE — 1159F PR MEDICATION LIST DOCUMENTED IN MEDICAL RECORD: ICD-10-PCS | Mod: CPTII,S$GLB,, | Performed by: INTERNAL MEDICINE

## 2023-05-09 PROCEDURE — 99204 OFFICE O/P NEW MOD 45 MIN: CPT | Mod: S$GLB,,, | Performed by: INTERNAL MEDICINE

## 2023-05-09 PROCEDURE — 99204 PR OFFICE/OUTPT VISIT, NEW, LEVL IV, 45-59 MIN: ICD-10-PCS | Mod: S$GLB,,, | Performed by: INTERNAL MEDICINE

## 2023-05-09 NOTE — PATIENT INSTRUCTIONS
EGD Instructions    [facility] [address]  Clinic Office [phone]  Endoscopy Lab [phone]    You are scheduled for an EGD with Dr. Frias  on  6/9/23 at [facility] in [place].    Check in at the Hospital -1st floor, Information desk.   Call [phone] to reschedule.    You cannot have anything to eat or drink after Midnight. You can brush your teeth with a sip of water.     An adult friend/family member must come with you to drive you home.  You cannot drive, take a taxi, Uber/Lyft or bus to leave the Endoscopy Center alone.  If you do not have someone to drive you home, your test will be cancelled.     Please follow the directions of your doctor if you take any pills that thin your blood. If you take these meds: Aggrenox, Brilinta, Effient, Eliquis, Lovenox, Plavix, Pletal, Pradaxa, Ticilid, Xarelto or Coumadin, let the doctor's office know.    DON'T: On the morning of the test do not take insulin or pills for diabetes.     DO: On the morning of the test, do take any pills for blood pressure, heart, anti-rejection and or seizures with a small sip of water. Bring any inhalers with you.    Leave all valuables and jewelry at home. You will be at the hospital for 2-4 hours.    Call the Endoscopy department at [phone] with any questions about your procedure.    Thank you for choosing [facility].

## 2023-05-09 NOTE — PROGRESS NOTES
GASTROENTEROLOGY CLINIC NOTE    Reason for visit: Diagnoses of Nausea and Gastroesophageal reflux disease, unspecified whether esophagitis present were pertinent to this visit.  Referring provider/PCP: Geovani Gomez MD    HPI:  Karla Washington is a 48 y.o. female here today for nausea , new patient.    Nausea , this is somewhat of a new symptom, symptoms started about 2 months ago. Never had this before.  She is not identified a trigger.  It is not strictly postprandial in fact it does not seem related to oral intake.  Not related to bowel movements.  There has been a intermittent right upper quadrant pain near the costal margin that she describes, however this has been present for weight longer than the nausea.  She takes Voltaren occasionally , but not consistently.  She is been taking Voltaren for a long time and does not feel this is causing any problems with nausea.  No new supplements, no new medications, no new over-the-counters.  She recently had a pregnancy test by her primary care doctor and that was negative.  Otherwise her labs have been unremarkable that were done a month or 2 ago.  She does have a chronically low elevated T bili consistent with Gilbert syndrome, that goes back even past 2014.  She did notice that around the time of the nausea starting she was having irregular menses.  She has not seen a gynecologist.  No specific abdominal pain.  No radiating factors.  The nausea has been associated with 1 episode of vomiting after meals. No heartburn, but she was told years ago might have reflux and tried prilosec but didn't know if that helped in past.    Prior Endoscopy:  EGD: no  Colon:  Never but had cologuard this year - negative.    (Portions of this note were dictated using voice recognition software and may contain dictation related errors in spelling/grammar/syntax not found on text review)    Review of Systems   Constitutional:  Negative for fever and malaise/fatigue.    Respiratory:  Negative for cough and shortness of breath.    Cardiovascular:  Negative for chest pain and palpitations.   Gastrointestinal:  Positive for nausea and vomiting. Negative for abdominal pain and blood in stool.   Neurological:  Negative for dizziness and headaches.     Past Medical History: has a past medical history of Abnormal Pap smear of cervix, Angio-edema, Fibroid of cervix, and History of colposcopy.    Past Surgical History: has a past surgical history that includes Tonsillectomy.    Family History:family history includes Alcohol abuse in her brother, father, maternal aunt, and sister; Bladder Cancer in her maternal grandfather; Breast cancer in her paternal aunt; Colon cancer in her maternal grandfather and maternal grandmother; Glaucoma in her maternal grandmother; Macular degeneration in her paternal grandmother; Melanoma in her mother; Thyroid disease in her mother, paternal grandmother, and sister.    Allergies:   Review of patient's allergies indicates:   Allergen Reactions    Pcn [penicillins] Hives       Social History: reports that she has never smoked. She has never used smokeless tobacco. She reports that she does not currently use alcohol after a past usage of about 6.0 standard drinks per week. She reports that she does not use drugs.    Home medications:   Current Outpatient Medications on File Prior to Visit   Medication Sig Dispense Refill    ammonium lactate (LAC-HYDRIN) 12 % lotion Apply topically 2 (two) times daily. soles 225 g 3    b complex vitamins tablet Take 1 tablet by mouth once daily.      diclofenac (VOLTAREN) 50 MG EC tablet Take 1 tablet (50 mg total) by mouth 2 (two) times daily. 40 tablet 1    EPINEPHrine (EPIPEN 2-MALDONADO) 0.3 mg/0.3 mL AtIn Inject 0.3 mLs (0.3 mg total) into the muscle once. for 1 dose 0.6 mL 5    Lactobacillus acidophilus (PROBIOTIC) 10 billion cell Cap       magnesium citrate 100 mg Tab       [DISCONTINUED] clobetasoL (TEMOVATE) 0.05 % cream  "Apply BID 2-4 weeks only unless already discussed 30 g 0    [DISCONTINUED] hydrOXYzine HCL (ATARAX) 50 MG tablet Take 1 tablet (50 mg total) by mouth nightly as needed (insomnia). 30 tablet 0    [DISCONTINUED] sars-cov-2, covid-19, (MODERNA COVID-19) 100 mcg/0.5 ml injection       [DISCONTINUED] triamcinolone acetonide 0.1% (KENALOG) 0.1 % cream Apply topically 2 (two) times daily. 45 g 0     No current facility-administered medications on file prior to visit.       Vital signs:  Ht 5' 6" (1.676 m)   Wt 74.3 kg (163 lb 12.8 oz)   BMI 26.44 kg/m²     Physical Exam  Vitals reviewed.   Constitutional:       General: She is not in acute distress.  HENT:      Head: Normocephalic and atraumatic.   Eyes:      General: No scleral icterus.     Conjunctiva/sclera: Conjunctivae normal.   Abdominal:      General: There is no distension.      Palpations: Abdomen is soft.      Tenderness: There is no abdominal tenderness.   Skin:     General: Skin is warm.      Coloration: Skin is not pale.      Findings: No rash.   Neurological:      Mental Status: She is oriented to person, place, and time.      Gait: Gait normal.   Psychiatric:         Mood and Affect: Mood normal.         Behavior: Behavior normal.       I have reviewed prior labs, imaging, notes from last month    Assessment:  1. Nausea    2. Gastroesophageal reflux disease, unspecified whether esophagitis present      Discussed the multiple etiologies of nausea.  Unclear etiology at present, but not quite convinced this is GI in origin, no relation to meals, no other abd symptoms etc.  She denies any specific heartburn symptoms but states years ago was told she might have reflux.    Plan:  Orders Placed This Encounter    US Abdomen Limited    Case Request Endoscopy: EGD (ESOPHAGOGASTRODUODENOSCOPY)     I suggest she follow up with GYN given the changes in menses.  We will plan U/s to eval GB And biliary disease.  We will plan EGD      RTC based on above.    Jim NICHOLS" MD [place]  [facility] [place]

## 2023-05-17 ENCOUNTER — HOSPITAL ENCOUNTER (OUTPATIENT)
Dept: RADIOLOGY | Facility: HOSPITAL | Age: 48
Discharge: HOME | End: 2023-05-17
Attending: INTERNAL MEDICINE
Payer: COMMERCIAL

## 2023-05-17 DIAGNOSIS — R11.0 NAUSEA: ICD-10-CM

## 2023-05-17 PROCEDURE — 76705 ECHO EXAM OF ABDOMEN: CPT | Mod: TC

## 2023-05-17 PROCEDURE — 76705 ECHO EXAM OF ABDOMEN: CPT | Mod: 26,,, | Performed by: RADIOLOGY

## 2023-05-17 PROCEDURE — 76705 US ABDOMEN LIMITED: ICD-10-PCS | Mod: 26,,, | Performed by: RADIOLOGY

## 2023-05-18 ENCOUNTER — HOSPITAL ENCOUNTER (OUTPATIENT)
Dept: RADIOLOGY | Facility: HOSPITAL | Age: 48
Discharge: HOME | End: 2023-05-18
Attending: INTERNAL MEDICINE
Payer: COMMERCIAL

## 2023-05-18 DIAGNOSIS — Z12.31 ENCOUNTER FOR SCREENING MAMMOGRAM FOR BREAST CANCER: ICD-10-CM

## 2023-05-18 PROCEDURE — 77067 SCR MAMMO BI INCL CAD: CPT | Mod: TC

## 2023-05-18 PROCEDURE — 77063 MAMMO DIGITAL SCREENING BILAT WITH TOMO: ICD-10-PCS | Mod: 26,,, | Performed by: RADIOLOGY

## 2023-05-18 PROCEDURE — 77067 SCR MAMMO BI INCL CAD: CPT | Mod: 26,,, | Performed by: RADIOLOGY

## 2023-05-18 PROCEDURE — 77067 MAMMO DIGITAL SCREENING BILAT WITH TOMO: ICD-10-PCS | Mod: 26,,, | Performed by: RADIOLOGY

## 2023-05-18 PROCEDURE — 77063 BREAST TOMOSYNTHESIS BI: CPT | Mod: 26,,, | Performed by: RADIOLOGY

## 2023-06-07 ENCOUNTER — TELEPHONE (OUTPATIENT)
Dept: ENDOSCOPY | Facility: HOSPITAL | Age: 48
End: 2023-06-07
Payer: COMMERCIAL

## 2023-06-07 NOTE — TELEPHONE ENCOUNTER
Left message  for arrival time @ 0830on Friday June 9    NPO status reviewed: Patient must have nothing to eat after midnight.  Pt may have CLEAR liquids ONLY until completely NPO 3 [id number]    Medications: Do not take Insulin or oral diabetic medications the day of the procedure.  Take as prescribed: heart, seizure and blood pressure medication in the morning with a sip of water (less than an ounce).  Take any breathing medications and bring inhalers to hospital with you Leave all valuables and jewelry at home.     Wear comfortable clothes to procedure to change into hospital gown You cannot drive for 24 hours after your procedure because you will receive sedation for your procedure to make you comfortable.  A ride must be provided at discharge.

## 2023-06-07 NOTE — TELEPHONE ENCOUNTER
Spoke with patient about arrival time @ 0830      NPO status reviewed: Patient must have nothing to eat or drink after midnight.      Medications: Do not take Insulin or oral diabetic medications the day of the procedure.  Take as prescribed: heart, seizure and blood pressure medication in the morning with a sip of water (less than an ounce).  Take any breathing medications and bring inhalers to hospital with you Leave all valuables and jewelry at home.     Wear comfortable clothes to procedure to change into hospital gown You cannot drive for 24 hours after your procedure because you will receive sedation for your procedure to make you comfortable.  A ride must be provided at discharge.

## 2023-06-09 ENCOUNTER — ANESTHESIA EVENT (OUTPATIENT)
Dept: ENDOSCOPY | Facility: HOSPITAL | Age: 48
End: 2023-06-09
Payer: COMMERCIAL

## 2023-06-09 ENCOUNTER — ANESTHESIA (OUTPATIENT)
Dept: ENDOSCOPY | Facility: HOSPITAL | Age: 48
End: 2023-06-09
Payer: COMMERCIAL

## 2023-06-09 ENCOUNTER — HOSPITAL ENCOUNTER (OUTPATIENT)
Facility: HOSPITAL | Age: 48
Discharge: HOME | End: 2023-06-09
Attending: INTERNAL MEDICINE | Admitting: INTERNAL MEDICINE
Payer: COMMERCIAL

## 2023-06-09 VITALS
TEMPERATURE: 98 F | HEIGHT: 66 IN | HEART RATE: 52 BPM | WEIGHT: 158 LBS | RESPIRATION RATE: 18 BRPM | SYSTOLIC BLOOD PRESSURE: 100 MMHG | BODY MASS INDEX: 25.39 KG/M2 | DIASTOLIC BLOOD PRESSURE: 55 MMHG | OXYGEN SATURATION: 100 %

## 2023-06-09 PROCEDURE — 43239 PR EGD, FLEX, W/BIOPSY, SGL/MULTI: ICD-10-PCS | Mod: ,,, | Performed by: INTERNAL MEDICINE

## 2023-06-09 PROCEDURE — D9220A PRA ANESTHESIA: ICD-10-PCS | Mod: ANES,,, | Performed by: UROLOGY

## 2023-06-09 PROCEDURE — 63600175 PHARM REV CODE 636 W HCPCS: Performed by: NURSE ANESTHETIST, CERTIFIED REGISTERED

## 2023-06-09 PROCEDURE — D9220A PRA ANESTHESIA: Mod: CRNA,,, | Performed by: NURSE ANESTHETIST, CERTIFIED REGISTERED

## 2023-06-09 PROCEDURE — 27201012 HC FORCEPS, HOT/COLD, DISP: Performed by: INTERNAL MEDICINE

## 2023-06-09 PROCEDURE — 43239 EGD BIOPSY SINGLE/MULTIPLE: CPT | Performed by: INTERNAL MEDICINE

## 2023-06-09 PROCEDURE — D9220A PRA ANESTHESIA: Mod: ANES,,, | Performed by: UROLOGY

## 2023-06-09 PROCEDURE — 25000003 PHARM REV CODE 250: Performed by: INTERNAL MEDICINE

## 2023-06-09 PROCEDURE — 37000009 HC ANESTHESIA EA ADD 15 MINS: Performed by: INTERNAL MEDICINE

## 2023-06-09 PROCEDURE — 43239 EGD BIOPSY SINGLE/MULTIPLE: CPT | Mod: ,,, | Performed by: INTERNAL MEDICINE

## 2023-06-09 PROCEDURE — 25000003 PHARM REV CODE 250: Performed by: NURSE ANESTHETIST, CERTIFIED REGISTERED

## 2023-06-09 PROCEDURE — 88305 TISSUE EXAM BY PATHOLOGIST: CPT | Mod: 26,,, | Performed by: PATHOLOGY

## 2023-06-09 PROCEDURE — 88305 TISSUE EXAM BY PATHOLOGIST: ICD-10-PCS | Mod: 26,,, | Performed by: PATHOLOGY

## 2023-06-09 PROCEDURE — 37000008 HC ANESTHESIA 1ST 15 MINUTES: Performed by: INTERNAL MEDICINE

## 2023-06-09 PROCEDURE — D9220A PRA ANESTHESIA: ICD-10-PCS | Mod: CRNA,,, | Performed by: NURSE ANESTHETIST, CERTIFIED REGISTERED

## 2023-06-09 PROCEDURE — 88305 TISSUE EXAM BY PATHOLOGIST: CPT | Mod: 59 | Performed by: PATHOLOGY

## 2023-06-09 RX ORDER — PROPOFOL 10 MG/ML
VIAL (ML) INTRAVENOUS
Status: DISCONTINUED | OUTPATIENT
Start: 2023-06-09 | End: 2023-06-09

## 2023-06-09 RX ORDER — SODIUM CHLORIDE 9 MG/ML
INJECTION, SOLUTION INTRAVENOUS CONTINUOUS
Status: DISCONTINUED | OUTPATIENT
Start: 2023-06-09 | End: 2023-06-09 | Stop reason: HOSPADM

## 2023-06-09 RX ORDER — PROPOFOL 10 MG/ML
VIAL (ML) INTRAVENOUS CONTINUOUS PRN
Status: DISCONTINUED | OUTPATIENT
Start: 2023-06-09 | End: 2023-06-09

## 2023-06-09 RX ORDER — PANTOPRAZOLE SODIUM 40 MG/1
40 TABLET, DELAYED RELEASE ORAL DAILY
Qty: 30 TABLET | Refills: 11 | Status: SHIPPED | OUTPATIENT
Start: 2023-06-09 | End: 2023-11-06

## 2023-06-09 RX ORDER — LIDOCAINE HYDROCHLORIDE 20 MG/ML
INJECTION INTRAVENOUS
Status: DISCONTINUED | OUTPATIENT
Start: 2023-06-09 | End: 2023-06-09

## 2023-06-09 RX ORDER — SUCRALFATE 1 G/1
1 TABLET ORAL
Qty: 120 TABLET | Refills: 2 | Status: SHIPPED | OUTPATIENT
Start: 2023-06-09 | End: 2023-11-06

## 2023-06-09 RX ADMIN — PROPOFOL 90 MG: 10 INJECTION, EMULSION INTRAVENOUS at 10:06

## 2023-06-09 RX ADMIN — LIDOCAINE HYDROCHLORIDE 100 MG: 20 INJECTION, SOLUTION INTRAVENOUS at 10:06

## 2023-06-09 RX ADMIN — PROPOFOL 150 MCG/KG/MIN: 10 INJECTION, EMULSION INTRAVENOUS at 10:06

## 2023-06-09 RX ADMIN — SODIUM CHLORIDE: 0.9 INJECTION, SOLUTION INTRAVENOUS at 09:06

## 2023-06-09 NOTE — ANESTHESIA PREPROCEDURE EVALUATION
06/09/2023  Karla Washington is a 48 y.o., female      Patient Active Problem List   Diagnosis    Family history of thyroid disease    History of HPV infection    Myalgia    Arthralgia    Angio-edema    Thyroid nodule    Moderate right ankle sprain       Past Medical History:   Diagnosis Date    Abnormal Pap smear of cervix     Angio-edema     Fibroid of cervix     History of colposcopy 1997    x2   1997, 2011       ECHO: No results found for this or any previous visit.      There is no height or weight on file to calculate BMI.    Tobacco Use: Low Risk     Smoking Tobacco Use: Never    Smokeless Tobacco Use: Never    Passive Exposure: Not on file       Social History     Substance and Sexual Activity   Drug Use No        Alcohol Use: Not on file       Review of patient's allergies indicates:   Allergen Reactions    Pcn [penicillins] Hives         Airway:  No value filed.         Pre-op Assessment    I have reviewed the Patient Summary Reports.     I have reviewed the Nursing Notes. I have reviewed the NPO Status.   I have reviewed the Medications.     Review of Systems  Anesthesia Hx:  History of prior surgery of interest to airway management or planning: Denies Family Hx of Anesthesia complications.   Denies Personal Hx of Anesthesia complications.       Physical Exam  General: Well nourished, Cooperative, Alert, Anxious and Oriented    Airway:  Mallampati: I / I  Mouth Opening: Normal  Tongue: Normal  Neck ROM: Normal ROM    Dental:  Intact        Anesthesia Plan  Type of Anesthesia, risks & benefits discussed:    Anesthesia Type: Gen Natural Airway  Intra-op Monitoring Plan: Standard ASA Monitors  Post Op Pain Control Plan: multimodal analgesia and IV/PO Opioids PRN  Induction:  IV  ASA Score: 2    Ready For Surgery From Anesthesia Perspective.     .

## 2023-06-09 NOTE — TRANSFER OF CARE
"Anesthesia Transfer of Care Note    Patient: Karla Washington    Procedure(s) Performed: Procedure(s) (LRB):  EGD (ESOPHAGOGASTRODUODENOSCOPY) (N/A)    Patient location: GI    Anesthesia Type: general    Transport from OR: Transported from OR on room air with adequate spontaneous ventilation    Post pain: adequate analgesia    Post assessment: no apparent anesthetic complications    Post vital signs: stable    Level of consciousness: awake, alert and oriented    Nausea/Vomiting: no nausea/vomiting    Complications: none    Transfer of care protocol was followed      Last vitals:   Visit Vitals  BP (!) 89/50   Pulse 61   Temp 36.8 °C (98.2 °F)   Resp 17   Ht 5' 6" (1.676 m)   Wt 71.7 kg (158 lb)   SpO2 96%   Breastfeeding No   BMI 25.50 kg/m²     "

## 2023-06-09 NOTE — [employer] PATIENT INSTRUCTIONS
Discharge Summary/Instructions after an Endoscopic Procedure  Patient Name: Karla Washington  Patient MRN: [id number]  Patient YOB: 1975  Friday, June 9, 2023  Jim Frias MD  Dear patient,  As a result of recent federal legislation (The Federal Cures Act), you may   receive lab or pathology results from your procedure in your [facility]   account before your physician is able to contact you. Your physician or   their representative will relay the results to you with their   recommendations at their soonest availability.  Thank you,  Your health is very important to us during the Covid Crisis. Following your   procedure today, you will receive a daily text for 2 weeks asking about   signs or symptoms of Covid 19.  Please respond to this text when you   receive it so we can follow up and keep you as safe as possible.   RESTRICTIONS:  During your procedure today, you received medications for sedation.  These   medications may affect your judgment, balance and coordination.  Therefore,   for 24 hours, you have the following restrictions:   - DO NOT drive a car, operate machinery, make legal/financial decisions,   sign important papers or drink alcohol.    ACTIVITY:  Today: no heavy lifting, straining or running due to procedural   sedation/anesthesia.  The following day: return to full activity including work.  DIET:  Eat and drink normally unless instructed otherwise.     TREATMENT FOR COMMON SIDE EFFECTS:  - Mild abdominal pain, nausea, belching, bloating or excessive gas:  rest,   eat lightly and use a heating pad.  - Sore Throat: treat with throat lozenges and/or gargle with warm salt   water.  - Because air was used during the procedure, expelling large amounts of air   from your rectum or belching is normal.  - If a bowel prep was taken, you may not have a bowel movement for 1-3 days.    This is normal.  SYMPTOMS TO WATCH FOR AND REPORT TO YOUR PHYSICIAN:  1. Abdominal pain or bloating, other than gas  cramps.  2. Chest pain.  3. Back pain.  4. Signs of infection such as: chills or fever occurring within 24 hours   after the procedure.  5. Rectal bleeding, which would show as bright red, maroon, or black stools.   (A tablespoon of blood from the rectum is not serious, especially if   hemorrhoids are present.)  6. Vomiting.  7. Weakness or dizziness.  GO DIRECTLY TO THE NEAREST EMERGENCY ROOM IF YOU HAVE ANY OF THE FOLLOWING:      Difficulty breathing              Chills and/or fever over 101 F   Persistent vomiting and/or vomiting blood   Severe abdominal pain   Severe chest pain   Black, tarry stools   Bleeding- more than one tablespoon   Any other symptom or condition that you feel may need urgent attention  Your doctor recommends these additional instructions:  If any biopsies were taken, your doctors clinic will contact you in 1 to 2   weeks with any results.  - Discharge patient to home.   - Patient has a contact number available for emergencies.  The signs and   symptoms of potential delayed complications were discussed with the   patient.  Return to normal activities tomorrow.  Written discharge   instructions were provided to the patient.   - Resume previous diet.   - Continue present medications.   - Await pathology results.   - Consider trial of protonix and carafate. Avoid NSAIDs (aleve, ibuprofen,   voltaren, etc)  For questions, problems or results please call your physician - Jim Frias MD.  EMERGENCY PHONE NUMBER: [phone],  LAB RESULTS: [phone]  IF A COMPLICATION OR EMERGENCY SITUATION ARISES AND YOU ARE UNABLE TO REACH   YOUR PHYSICIAN - GO DIRECTLY TO THE EMERGENCY ROOM.  Jim Frias MD  6/9/2023 10:12:32 AM  This report has been verified and signed electronically.  Dear patient,  As a result of recent federal legislation (The Federal Cures Act), you may   receive lab or pathology results from your procedure in your [facility]   account before your physician is able to  contact you. Your physician or   their representative will relay the results to you with their   recommendations at their soonest availability.  Thank you,  [employer]ATION

## 2023-06-09 NOTE — ANESTHESIA POSTPROCEDURE EVALUATION
Anesthesia Post Evaluation    Patient: Karla Washington    Procedure(s) Performed: Procedure(s) (LRB):  EGD (ESOPHAGOGASTRODUODENOSCOPY) (N/A)    Final Anesthesia Type: general      Patient location during evaluation: GI PACU  Patient participation: Yes- Able to Participate  Level of consciousness: awake and alert and oriented  Post-procedure vital signs: reviewed and stable  Pain management: adequate  Airway patency: patent    PONV status at discharge: No PONV  Anesthetic complications: no      Cardiovascular status: hemodynamically stable  Respiratory status: unassisted  Hydration status: euvolemic  Follow-up not needed.          Vitals Value Taken Time   /55 06/09/23 1038   Temp 98 06/09/23 1116   Pulse 52 06/09/23 1038   Resp 18 06/09/23 1038   SpO2 100 % 06/09/23 1038         Event Time   Out of Recovery 10:44:35         Pain/Lanny Score: Lanny Score: 10 (6/9/2023 10:38 AM)

## 2023-06-09 NOTE — H&P
Short Stay Endoscopy History and Physical    PCP - Geovani Gomez MD     Procedure - EGD  ASA - per anesthesia  Mallampati - per anesthesia  History of Anesthesia problems - no  Family history Anesthesia problems -  no   Plan of anesthesia - General    HPI:  This is a 48 y.o. female here for evaluation of :     nausea  gerd      ROS:  Constitutional: No fevers, chills, No weight loss  CV: No chest pain  Pulm: No cough, No shortness of breath  Ophtho: No vision changes  GI: see HPI  Derm: No rash    Medical History:  has a past medical history of Abnormal Pap smear of cervix, Angio-edema, Fibroid of cervix, and History of colposcopy (1997).    Surgical History:  has a past surgical history that includes Tonsillectomy.    Family History: family history includes Alcohol abuse in her brother, father, maternal aunt, and sister; Bladder Cancer in her maternal grandfather; Breast cancer in her paternal aunt; Colon cancer in her maternal grandfather and maternal grandmother; Glaucoma in her maternal grandmother; Macular degeneration in her paternal grandmother; Melanoma in her mother; Thyroid disease in her mother, paternal grandmother, and sister.. Otherwise no colon cancer, inflammatory bowel disease, or GI malignancies.    Social History:  reports that she has never smoked. She has never used smokeless tobacco. She reports that she does not currently use alcohol. She reports that she does not use drugs.    Review of patient's allergies indicates:   Allergen Reactions    Pcn [penicillins] Hives       Medications:   Medications Prior to Admission   Medication Sig Dispense Refill Last Dose    ammonium lactate (LAC-HYDRIN) 12 % lotion Apply topically 2 (two) times daily. soles 225 g 3 6/8/2023    b complex vitamins tablet Take 1 tablet by mouth once daily.   6/8/2023    diclofenac (VOLTAREN) 50 MG EC tablet Take 1 tablet (50 mg total) by mouth 2 (two) times daily. 40 tablet 1 Past Month    Lactobacillus acidophilus  (PROBIOTIC) 10 billion cell Cap    6/8/2023    magnesium citrate 100 mg Tab    6/8/2023    EPINEPHrine (EPIPEN 2-MALDONADO) 0.3 mg/0.3 mL AtIn Inject 0.3 mLs (0.3 mg total) into the muscle once. for 1 dose 0.6 mL 5        Physical Exam:    Vital Signs:   Vitals:    06/09/23 0855   BP: 110/63   Pulse: 73   Resp: 16   Temp: 98.2 °F (36.8 °C)       General Appearance: Well appearing in no acute distress  Eyes:    No scleral icterus  ENT: Neck supple, Lips, mucosa, and tongue normal; teeth and gums normal  Abdomen: Soft, non tender, non distended with normal bowel sounds. No hepatosplenomegaly, ascites, or mass.  Extremities: No edema  Skin: No rash    Labs:  Lab Results   Component Value Date    WBC 6.06 03/29/2023    HGB 14.3 03/29/2023    HCT 45.0 03/29/2023     03/29/2023    CHOL 240 (H) 07/11/2022    TRIG 74 07/11/2022    HDL 46 07/11/2022    ALT 13 03/29/2023    AST 19 03/29/2023     03/29/2023    K 3.5 03/29/2023     03/29/2023    CREATININE 0.8 03/29/2023    BUN 11 03/29/2023    CO2 28 03/29/2023    TSH 1.004 07/11/2022    HGBA1C 5.0 07/11/2022       I have explained the risks and benefits of endoscopy procedures to the patient including but not limited to bleeding, perforation, infection, and death.  The patient was asked if they understand and allowed to ask any further questions to their satisfaction.      Jim Frias MD

## 2023-06-14 LAB
FINAL PATHOLOGIC DIAGNOSIS: NORMAL
GROSS: NORMAL
Lab: NORMAL

## 2023-06-27 ENCOUNTER — LAB VISIT (OUTPATIENT)
Dept: LAB | Facility: OTHER | Age: 48
End: 2023-06-27
Attending: OBSTETRICS & GYNECOLOGY
Payer: COMMERCIAL

## 2023-06-27 ENCOUNTER — OFFICE VISIT (OUTPATIENT)
Dept: OBSTETRICS AND GYNECOLOGY | Facility: CLINIC | Age: 48
End: 2023-06-27
Attending: OBSTETRICS & GYNECOLOGY
Payer: COMMERCIAL

## 2023-06-27 VITALS
SYSTOLIC BLOOD PRESSURE: 110 MMHG | HEIGHT: 66 IN | WEIGHT: 165.38 LBS | BODY MASS INDEX: 26.58 KG/M2 | DIASTOLIC BLOOD PRESSURE: 70 MMHG

## 2023-06-27 DIAGNOSIS — N95.1 PERIMENOPAUSAL: Primary | ICD-10-CM

## 2023-06-27 DIAGNOSIS — N92.6 IRREGULAR MENSTRUAL BLEEDING: ICD-10-CM

## 2023-06-27 DIAGNOSIS — N95.1 PERIMENOPAUSAL: ICD-10-CM

## 2023-06-27 DIAGNOSIS — R11.0 NAUSEA: ICD-10-CM

## 2023-06-27 LAB
BASOPHILS # BLD AUTO: 0.03 K/UL (ref 0–0.2)
BASOPHILS NFR BLD: 0.6 % (ref 0–1.9)
DIFFERENTIAL METHOD: NORMAL
EOSINOPHIL # BLD AUTO: 0 K/UL (ref 0–0.5)
EOSINOPHIL NFR BLD: 0.6 % (ref 0–8)
ERYTHROCYTE [DISTWIDTH] IN BLOOD BY AUTOMATED COUNT: 11.9 % (ref 11.5–14.5)
FSH SERPL-ACNC: 10.67 MIU/ML
HCG INTACT+B SERPL-ACNC: <1.2 MIU/ML
HCT VFR BLD AUTO: 40.3 % (ref 37–48.5)
HGB BLD-MCNC: 13.4 G/DL (ref 12–16)
IMM GRANULOCYTES # BLD AUTO: 0.01 K/UL (ref 0–0.04)
IMM GRANULOCYTES NFR BLD AUTO: 0.2 % (ref 0–0.5)
LH SERPL-ACNC: 3.3 MIU/ML
LYMPHOCYTES # BLD AUTO: 2.3 K/UL (ref 1–4.8)
LYMPHOCYTES NFR BLD: 44.7 % (ref 18–48)
MCH RBC QN AUTO: 29.1 PG (ref 27–31)
MCHC RBC AUTO-ENTMCNC: 33.3 G/DL (ref 32–36)
MCV RBC AUTO: 88 FL (ref 82–98)
MONOCYTES # BLD AUTO: 0.5 K/UL (ref 0.3–1)
MONOCYTES NFR BLD: 9.2 % (ref 4–15)
NEUTROPHILS # BLD AUTO: 2.3 K/UL (ref 1.8–7.7)
NEUTROPHILS NFR BLD: 44.7 % (ref 38–73)
NRBC BLD-RTO: 0 /100 WBC
PLATELET # BLD AUTO: 165 K/UL (ref 150–450)
PMV BLD AUTO: 11.6 FL (ref 9.2–12.9)
RBC # BLD AUTO: 4.6 M/UL (ref 4–5.4)
TSH SERPL DL<=0.005 MIU/L-ACNC: 1.3 UIU/ML (ref 0.4–4)
WBC # BLD AUTO: 5.1 K/UL (ref 3.9–12.7)

## 2023-06-27 PROCEDURE — 99999 PR PBB SHADOW E&M-EST. PATIENT-LVL III: ICD-10-PCS | Mod: PBBFAC,,, | Performed by: OBSTETRICS & GYNECOLOGY

## 2023-06-27 PROCEDURE — 3078F DIAST BP <80 MM HG: CPT | Mod: CPTII,S$GLB,, | Performed by: OBSTETRICS & GYNECOLOGY

## 2023-06-27 PROCEDURE — 85025 COMPLETE CBC W/AUTO DIFF WBC: CPT | Performed by: OBSTETRICS & GYNECOLOGY

## 2023-06-27 PROCEDURE — 3074F SYST BP LT 130 MM HG: CPT | Mod: CPTII,S$GLB,, | Performed by: OBSTETRICS & GYNECOLOGY

## 2023-06-27 PROCEDURE — 83002 ASSAY OF GONADOTROPIN (LH): CPT | Performed by: OBSTETRICS & GYNECOLOGY

## 2023-06-27 PROCEDURE — 1159F PR MEDICATION LIST DOCUMENTED IN MEDICAL RECORD: ICD-10-PCS | Mod: CPTII,S$GLB,, | Performed by: OBSTETRICS & GYNECOLOGY

## 2023-06-27 PROCEDURE — 84702 CHORIONIC GONADOTROPIN TEST: CPT | Performed by: OBSTETRICS & GYNECOLOGY

## 2023-06-27 PROCEDURE — 3008F BODY MASS INDEX DOCD: CPT | Mod: CPTII,S$GLB,, | Performed by: OBSTETRICS & GYNECOLOGY

## 2023-06-27 PROCEDURE — 1159F MED LIST DOCD IN RCRD: CPT | Mod: CPTII,S$GLB,, | Performed by: OBSTETRICS & GYNECOLOGY

## 2023-06-27 PROCEDURE — 3078F PR MOST RECENT DIASTOLIC BLOOD PRESSURE < 80 MM HG: ICD-10-PCS | Mod: CPTII,S$GLB,, | Performed by: OBSTETRICS & GYNECOLOGY

## 2023-06-27 PROCEDURE — 99213 PR OFFICE/OUTPT VISIT, EST, LEVL III, 20-29 MIN: ICD-10-PCS | Mod: S$GLB,,, | Performed by: OBSTETRICS & GYNECOLOGY

## 2023-06-27 PROCEDURE — 3074F PR MOST RECENT SYSTOLIC BLOOD PRESSURE < 130 MM HG: ICD-10-PCS | Mod: CPTII,S$GLB,, | Performed by: OBSTETRICS & GYNECOLOGY

## 2023-06-27 PROCEDURE — 84443 ASSAY THYROID STIM HORMONE: CPT | Performed by: OBSTETRICS & GYNECOLOGY

## 2023-06-27 PROCEDURE — 1160F RVW MEDS BY RX/DR IN RCRD: CPT | Mod: CPTII,S$GLB,, | Performed by: OBSTETRICS & GYNECOLOGY

## 2023-06-27 PROCEDURE — 83001 ASSAY OF GONADOTROPIN (FSH): CPT | Performed by: OBSTETRICS & GYNECOLOGY

## 2023-06-27 PROCEDURE — 1160F PR REVIEW ALL MEDS BY PRESCRIBER/CLIN PHARMACIST DOCUMENTED: ICD-10-PCS | Mod: CPTII,S$GLB,, | Performed by: OBSTETRICS & GYNECOLOGY

## 2023-06-27 PROCEDURE — 99999 PR PBB SHADOW E&M-EST. PATIENT-LVL III: CPT | Mod: PBBFAC,,, | Performed by: OBSTETRICS & GYNECOLOGY

## 2023-06-27 PROCEDURE — 3008F PR BODY MASS INDEX (BMI) DOCUMENTED: ICD-10-PCS | Mod: CPTII,S$GLB,, | Performed by: OBSTETRICS & GYNECOLOGY

## 2023-06-27 PROCEDURE — 82672 ASSAY OF ESTROGEN: CPT | Performed by: OBSTETRICS & GYNECOLOGY

## 2023-06-27 PROCEDURE — 99213 OFFICE O/P EST LOW 20 MIN: CPT | Mod: S$GLB,,, | Performed by: OBSTETRICS & GYNECOLOGY

## 2023-06-27 NOTE — PROGRESS NOTES
"Chief Complaint   Patient presents with    Nausea       HPI:  Karla Washington is a 48 y.o. female patient  who presents today to discuss perimenopausal issues.  Over the past 6 months, periods have become irregular and spacing out, now occurring every 1-3 months.  Her last period occurred 23 and lasted 5 days in duration.  Denies hot flashes and sweats.  For the past 6 months, she has also noted intermittent episodes of nausea which do not have a specific inciting event.  Specifically, this nausea is not related to diet, medication, time of day, or menses.  She has seen GI and undergone an upper GI evaluation without a specific etiology found.  Patient's last menstrual period was 2023 (exact date).    Blood pressure 110/70, height 5' 6" (1.676 m), weight 75 kg (165 lb 5.5 oz), last menstrual period 2023.    23  Mammogram: Negative, TC 20.51    21 Pap: Negative, HPV: Negative    23 Abdominal sono:  FINDINGS:  Liver: Normal in size measuring 15.3 cm.  Homogeneous echotexture.  Two foci of shadowing calcification suggesting granulomas, largest measuring up to 1.8 cm.  Gallbladder: No calculi, wall thickening, or pericholecystic fluid.  No sonographic Lovell's sign.  Biliary system: The common duct is not dilated, measuring 2 mm.  No intrahepatic ductal dilatation.  Spleen: Normal in size, measuring 8.9 cm.  Miscellaneous: Visualized pancreas is unremarkable.  No upper abdominal ascites.  Impression:  No significant abnormality.      Past Medical History:   Diagnosis Date    Abnormal Pap smear of cervix     Angio-edema     Fibroid of cervix     History of colposcopy 1997    x2   1997,        Past Surgical History:   Procedure Laterality Date    ESOPHAGOGASTRODUODENOSCOPY N/A 2023    Procedure: EGD (ESOPHAGOGASTRODUODENOSCOPY);  Surgeon: Jim Frias MD;  Location: [facility];  Service: Endoscopy;  Laterality: N/A;    TONSILLECTOMY           Diagnosis:  1. Perimenopausal  "   2. Irregular menstrual bleeding    3. Nausea          PLAN:    Orders Placed This Encounter    TSH    CBC Auto Differential    FOLLICLE STIMULATING HORMONE    LUTEINIZING HORMONE    ESTROGENS, TOTAL    HCG, QUANTITATIVE, PREGNANCY       Patient was counseled today on nausea and various etiologies.  GI work-up has been essentially negative.  She will have hormone levels drawn for evaluation of her menopausal status.  We also reviewed expected perimenopausal bleeding patterns.      Follow-up after labs.  Return for annual exam.    I spent a total of 22 minutes on the day of the visit.This includes face to face time and non-face to face time preparing to see the patient (eg, review of tests), Obtaining and/or reviewing separately obtained history, Documenting clinical information in the electronic or other health record, Independently interpreting resultsand communicating results to the patient/family/caregiver, or Care coordination.    This note was created with voice recognition software.  Grammatical, syntax and spelling errors may be inevitable.

## 2023-06-28 ENCOUNTER — TELEPHONE (OUTPATIENT)
Dept: OBSTETRICS AND GYNECOLOGY | Facility: CLINIC | Age: 48
End: 2023-06-28
Payer: COMMERCIAL

## 2023-06-28 NOTE — TELEPHONE ENCOUNTER
Called patient:    Discussed results of labs - consistent with perimenopausal status:    FSH 10.67,  LH 3.3,  E2 pending    TSH 1.304,  H/H 13.4/40.3,  BHCG Negative

## 2023-07-06 ENCOUNTER — TELEPHONE (OUTPATIENT)
Dept: OBSTETRICS AND GYNECOLOGY | Facility: CLINIC | Age: 48
End: 2023-07-06
Payer: COMMERCIAL

## 2023-07-06 NOTE — TELEPHONE ENCOUNTER
----- Message from Astrid Nupur sent at 7/6/2023  9:28 AM CDT -----  Regarding: Lab test cancellation  There was an issue with the Total Estrogen test ordered on this patient from 6/27/2023.    Unfortunately, the reference lab received a sample that was insufficient in volume (Quantity Not Sufficient;QNS) for testing so the order has been cancelled and will need to be reordered and recollected if clinically indicated.    Please call the Sendout lab at [phone] ext. [phone] if there are any questions.  Anyone in the Sendout lab will be able to assist you.

## 2023-07-10 ENCOUNTER — HOSPITAL ENCOUNTER (OUTPATIENT)
Dept: RADIOLOGY | Facility: HOSPITAL | Age: 48
Discharge: HOME | End: 2023-07-10
Attending: FAMILY MEDICINE
Payer: COMMERCIAL

## 2023-07-10 ENCOUNTER — TELEPHONE (OUTPATIENT)
Dept: FAMILY MEDICINE | Facility: CLINIC | Age: 48
End: 2023-07-10

## 2023-07-10 ENCOUNTER — OFFICE VISIT (OUTPATIENT)
Dept: FAMILY MEDICINE | Facility: CLINIC | Age: 48
End: 2023-07-10
Attending: FAMILY MEDICINE
Payer: COMMERCIAL

## 2023-07-10 VITALS — HEIGHT: 66 IN | BODY MASS INDEX: 26.52 KG/M2 | WEIGHT: 165 LBS

## 2023-07-10 DIAGNOSIS — M54.50 DORSALGIA OF LUMBAR REGION: Primary | ICD-10-CM

## 2023-07-10 DIAGNOSIS — M54.50 DORSALGIA OF LUMBAR REGION: ICD-10-CM

## 2023-07-10 PROCEDURE — 1160F RVW MEDS BY RX/DR IN RCRD: CPT | Mod: CPTII,95,, | Performed by: FAMILY MEDICINE

## 2023-07-10 PROCEDURE — 72110 X-RAY EXAM L-2 SPINE 4/>VWS: CPT | Mod: TC,PN

## 2023-07-10 PROCEDURE — 99213 PR OFFICE/OUTPT VISIT, EST, LEVL III, 20-29 MIN: ICD-10-PCS | Mod: 95,,, | Performed by: FAMILY MEDICINE

## 2023-07-10 PROCEDURE — 3008F BODY MASS INDEX DOCD: CPT | Mod: CPTII,95,, | Performed by: FAMILY MEDICINE

## 2023-07-10 PROCEDURE — 1160F PR REVIEW ALL MEDS BY PRESCRIBER/CLIN PHARMACIST DOCUMENTED: ICD-10-PCS | Mod: CPTII,95,, | Performed by: FAMILY MEDICINE

## 2023-07-10 PROCEDURE — 72110 X-RAY EXAM L-2 SPINE 4/>VWS: CPT | Mod: 26,,, | Performed by: RADIOLOGY

## 2023-07-10 PROCEDURE — 1159F PR MEDICATION LIST DOCUMENTED IN MEDICAL RECORD: ICD-10-PCS | Mod: CPTII,95,, | Performed by: FAMILY MEDICINE

## 2023-07-10 PROCEDURE — 99213 OFFICE O/P EST LOW 20 MIN: CPT | Mod: 95,,, | Performed by: FAMILY MEDICINE

## 2023-07-10 PROCEDURE — 1159F MED LIST DOCD IN RCRD: CPT | Mod: CPTII,95,, | Performed by: FAMILY MEDICINE

## 2023-07-10 PROCEDURE — 3008F PR BODY MASS INDEX (BMI) DOCUMENTED: ICD-10-PCS | Mod: CPTII,95,, | Performed by: FAMILY MEDICINE

## 2023-07-10 PROCEDURE — 72110 XR LUMBAR SPINE COMPLETE 5 VIEW: ICD-10-PCS | Mod: 26,,, | Performed by: RADIOLOGY

## 2023-07-10 RX ORDER — CYCLOBENZAPRINE HCL 5 MG
5 TABLET ORAL NIGHTLY PRN
Qty: 10 TABLET | Refills: 0 | Status: SHIPPED | OUTPATIENT
Start: 2023-07-10 | End: 2023-07-20

## 2023-07-10 NOTE — PROGRESS NOTES
The patient location is: home  The chief complaint leading to consultation is: back pain    Visit type: audiovisual    Face to Face time with patient: 15  20 minutes of total time spent on the encounter, which includes face to face time and non-face to face time preparing to see the patient (eg, review of tests), Obtaining and/or reviewing separately obtained history, Documenting clinical information in the electronic or other health record, Independently interpreting results (not separately reported) and communicating results to the patient/family/caregiver, or Care coordination (not separately reported).         Each patient to whom he or she provides medical services by telemedicine is:  (1) informed of the relationship between the physician and patient and the respective role of any other health care provider with respect to management of the patient; and (2) notified that he or she may decline to receive medical services by telemedicine and may withdraw from such care at any time.    Notes:   Subjective:       Patient ID: Karla Washington is a 48 y.o. female.    Chief Complaint: No chief complaint on file.    Back Pain  This is a recurrent problem. The current episode started yesterday. The problem occurs intermittently. The problem has been waxing and waning since onset. The pain is present in the gluteal. The quality of the pain is described as burning, shooting and stabbing. The pain radiates to the right foot and right thigh. The pain is at a severity of 9/10. The pain is severe. The pain is The same all the time. The symptoms are aggravated by bending, coughing, position, lying down, sitting and standing. Associated symptoms include headaches, leg pain, numbness, paresthesias and tingling. Pertinent negatives include no abdominal pain, bladder incontinence, bowel incontinence, chest pain, dysuria, fever, paresis, pelvic pain, perianal numbness, weakness or weight loss. The treatment provided mild relief.  "  Pt in virtual visit for c/o low back pain she has had this in the past she has been active with increased pain over the past day or so 5-9/10.  She was told by her GI not to take nsaids and tylenol isnt helping.    Review of Systems   Constitutional:  Negative for chills, fatigue, fever and weight loss.   Respiratory:  Negative for cough, chest tightness and shortness of breath.    Cardiovascular:  Negative for chest pain and palpitations.   Gastrointestinal:  Negative for abdominal pain and bowel incontinence.   Genitourinary:  Negative for bladder incontinence, dysuria, hematuria and pelvic pain.   Musculoskeletal:  Positive for back pain. Negative for neck pain.   Neurological:  Positive for tingling, numbness, headaches and paresthesias. Negative for weakness.     Objective:    LMP 06/16/2023 (Exact Date)     Physical Exam  Constitutional:       Appearance: Normal appearance. She is not ill-appearing.   HENT:      Head: Normocephalic and atraumatic.      Nose: Nose normal.   Pulmonary:      Effort: Pulmonary effort is normal. No respiratory distress.   Musculoskeletal:         General: Signs of injury present. No deformity.   Neurological:      General: No focal deficit present.      Mental Status: She is alert and oriented to person, place, and time.      Cranial Nerves: No cranial nerve deficit.      Coordination: Coordination normal.   Psychiatric:         Mood and Affect: Mood normal.         Behavior: Behavior normal.         Thought Content: Thought content normal.         Judgment: Judgment normal.       Assessment:       1. Dorsalgia of lumbar region        Plan:     Orders lumbar x-ray  F/u back and spine  Start otc topicals  Flexeril prn pt aware sedation  F/u pcp       "This note will not be shared with the patient."   "

## 2023-07-11 ENCOUNTER — OFFICE VISIT (OUTPATIENT)
Dept: SPINE | Facility: CLINIC | Age: 48
End: 2023-07-11
Attending: FAMILY MEDICINE
Payer: COMMERCIAL

## 2023-07-11 VITALS
OXYGEN SATURATION: 100 % | SYSTOLIC BLOOD PRESSURE: 126 MMHG | BODY MASS INDEX: 26.63 KG/M2 | DIASTOLIC BLOOD PRESSURE: 82 MMHG | HEART RATE: 87 BPM | RESPIRATION RATE: 18 BRPM | WEIGHT: 165 LBS | TEMPERATURE: 98 F

## 2023-07-11 DIAGNOSIS — M54.50 DORSALGIA OF LUMBAR REGION: ICD-10-CM

## 2023-07-11 DIAGNOSIS — M47.26 OSTEOARTHRITIS OF SPINE WITH RADICULOPATHY, LUMBAR REGION: ICD-10-CM

## 2023-07-11 DIAGNOSIS — M51.36 DDD (DEGENERATIVE DISC DISEASE), LUMBAR: Primary | ICD-10-CM

## 2023-07-11 DIAGNOSIS — S39.012A STRAIN OF LUMBAR REGION, INITIAL ENCOUNTER: ICD-10-CM

## 2023-07-11 PROCEDURE — 3079F DIAST BP 80-89 MM HG: CPT | Mod: CPTII,S$GLB,, | Performed by: NURSE PRACTITIONER

## 2023-07-11 PROCEDURE — 3074F PR MOST RECENT SYSTOLIC BLOOD PRESSURE < 130 MM HG: ICD-10-PCS | Mod: CPTII,S$GLB,, | Performed by: NURSE PRACTITIONER

## 2023-07-11 PROCEDURE — 99204 OFFICE O/P NEW MOD 45 MIN: CPT | Mod: S$GLB,,, | Performed by: NURSE PRACTITIONER

## 2023-07-11 PROCEDURE — 1159F MED LIST DOCD IN RCRD: CPT | Mod: CPTII,S$GLB,, | Performed by: NURSE PRACTITIONER

## 2023-07-11 PROCEDURE — 1159F PR MEDICATION LIST DOCUMENTED IN MEDICAL RECORD: ICD-10-PCS | Mod: CPTII,S$GLB,, | Performed by: NURSE PRACTITIONER

## 2023-07-11 PROCEDURE — 3079F PR MOST RECENT DIASTOLIC BLOOD PRESSURE 80-89 MM HG: ICD-10-PCS | Mod: CPTII,S$GLB,, | Performed by: NURSE PRACTITIONER

## 2023-07-11 PROCEDURE — 1160F PR REVIEW ALL MEDS BY PRESCRIBER/CLIN PHARMACIST DOCUMENTED: ICD-10-PCS | Mod: CPTII,S$GLB,, | Performed by: NURSE PRACTITIONER

## 2023-07-11 PROCEDURE — 1160F RVW MEDS BY RX/DR IN RCRD: CPT | Mod: CPTII,S$GLB,, | Performed by: NURSE PRACTITIONER

## 2023-07-11 PROCEDURE — 99999 PR PBB SHADOW E&M-EST. PATIENT-LVL V: ICD-10-PCS | Mod: PBBFAC,,, | Performed by: NURSE PRACTITIONER

## 2023-07-11 PROCEDURE — 99204 PR OFFICE/OUTPT VISIT, NEW, LEVL IV, 45-59 MIN: ICD-10-PCS | Mod: S$GLB,,, | Performed by: NURSE PRACTITIONER

## 2023-07-11 PROCEDURE — 3074F SYST BP LT 130 MM HG: CPT | Mod: CPTII,S$GLB,, | Performed by: NURSE PRACTITIONER

## 2023-07-11 PROCEDURE — 3008F BODY MASS INDEX DOCD: CPT | Mod: CPTII,S$GLB,, | Performed by: NURSE PRACTITIONER

## 2023-07-11 PROCEDURE — 3008F PR BODY MASS INDEX (BMI) DOCUMENTED: ICD-10-PCS | Mod: CPTII,S$GLB,, | Performed by: NURSE PRACTITIONER

## 2023-07-11 PROCEDURE — 99999 PR PBB SHADOW E&M-EST. PATIENT-LVL V: CPT | Mod: PBBFAC,,, | Performed by: NURSE PRACTITIONER

## 2023-07-11 RX ORDER — DICLOFENAC SODIUM 10 MG/G
2 GEL TOPICAL 4 TIMES DAILY
Qty: 2 G | Refills: 1 | Status: SHIPPED | OUTPATIENT
Start: 2023-07-11

## 2023-07-11 NOTE — PROGRESS NOTES
Subjective:     Patient ID: Karla Washington is a 48 y.o. female.    Chief Complaint: No chief complaint on file.    HPI Ms. Washington is a 48-year-old female here for evaluation of low back and leg pain    Complaints of right glute and right leg sciatica type symptoms that started Sunday after lifting a cardiac  She does note a history of sciatica type pain in the past on her left  She describes the pain as starting in the right glute and radiating down the right leg sharp and shooting to the bottom of her foot and her big toe with a burning sensation in the calf  No PT or injections in the past  Works as a   Prescribed Flexeril by PCP but has not started it yet    Lumbar xray 2023    FINDINGS:  The disc spaces are well preserved.  No fracture, spondylolisthesis or bone destruction identified     Impression:     See above    Past Medical History:   Diagnosis Date    Abnormal Pap smear of cervix     Angio-edema     Fibroid of cervix     History of colposcopy 1997    x2   1997, 2011       Past Surgical History:   Procedure Laterality Date    ESOPHAGOGASTRODUODENOSCOPY N/A 6/9/2023    Procedure: EGD (ESOPHAGOGASTRODUODENOSCOPY);  Surgeon: Jim Frias MD;  Location: [facility];  Service: Endoscopy;  Laterality: N/A;    TONSILLECTOMY         Family History   Problem Relation Age of Onset    Thyroid disease Mother     Melanoma Mother     Alcohol abuse Father     Alcohol abuse Sister     Alcohol abuse Brother     Alcohol abuse Maternal Aunt     Thyroid disease Sister     Thyroid disease Paternal Grandmother     Macular degeneration Paternal Grandmother     Colon cancer Maternal Grandmother     Glaucoma Maternal Grandmother     Breast cancer Paternal Aunt     Bladder Cancer Maternal Grandfather         metastasized to his breast    Colon cancer Maternal Grandfather     Ovarian cancer Neg Hx     Blindness Neg Hx     Retinal detachment Neg Hx        Social History     Socioeconomic History    Marital  status:     Number of children: 0   Tobacco Use    Smoking status: Former     Types: Cigarettes    Smokeless tobacco: Never   Substance and Sexual Activity    Alcohol use: Not Currently    Drug use: No    Sexual activity: Yes     Partners: Male     Birth control/protection: None   Social History Narrative    The patient does exercise regularly (5-6x/week; ).    Rates diet as good.    She is not satisfied with weight.           Current Outpatient Medications   Medication Sig Dispense Refill    ammonium lactate (LAC-HYDRIN) 12 % lotion Apply topically 2 (two) times daily. soles 225 g 3    b complex vitamins tablet Take 1 tablet by mouth once daily.      cyclobenzaprine (FLEXERIL) 5 MG tablet Take 1 tablet (5 mg total) by mouth nightly as needed. 10 tablet 0    diclofenac (VOLTAREN) 50 MG EC tablet Take 1 tablet (50 mg total) by mouth 2 (two) times daily. 40 tablet 1    EPINEPHrine (EPIPEN 2-MALDONADO) 0.3 mg/0.3 mL AtIn Inject 0.3 mLs (0.3 mg total) into the muscle once. for 1 dose 0.6 mL 5    Lactobacillus acidophilus (PROBIOTIC) 10 billion cell Cap       magnesium citrate 100 mg Tab       pantoprazole (PROTONIX) 40 MG tablet Take 1 tablet (40 mg total) by mouth once daily. 30 tablet 11    sucralfate (CARAFATE) 1 gram tablet Take 1 tablet (1 g total) by mouth every meal as needed (nausea). 120 tablet 2     No current facility-administered medications for this visit.       Review of patient's allergies indicates:   Allergen Reactions    Pcn [penicillins] Hives         Review of Systems   Constitutional: Negative for fever.   Cardiovascular:  Negative for chest pain.   Respiratory:  Negative for shortness of breath.    Musculoskeletal:  Positive for back pain. Negative for falls.   Gastrointestinal:  Negative for abdominal pain, bowel incontinence, nausea and vomiting.   Genitourinary:  Negative for bladder incontinence.   Neurological:  Positive for paresthesias.      Objective:     General: Karla is  well-developed, well-nourished, appears stated age, in no acute distress, alert and oriented to time, place and person.     General    Vitals reviewed.  Constitutional: She is oriented to person, place, and time. She appears well-developed and well-nourished.   HENT:   Head: Atraumatic.   Nose: Nose normal.   Eyes: Conjunctivae are normal.   Cardiovascular:  Normal rate.            Pulmonary/Chest: Effort normal.   Neurological: She is alert and oriented to person, place, and time.   Psychiatric: She has a normal mood and affect. Her behavior is normal. Judgment and thought content normal.     General Musculoskeletal Exam   Gait: normal     Back (L-Spine & T-Spine) / Neck (C-Spine) Exam     Tenderness Posterior midline palpation reveals tenderness of the Lower L-Spine. Right paramedian tenderness of the Lower L-Spine and Sacrum.     Back (L-Spine & T-Spine) Range of Motion   Extension:  10   Flexion:  90   Lateral bend right:  10   Lateral bend left:  10     Spinal Sensation   Right Side Sensation  L-Spine Level: normal  S-Spine Level: normal  Left Side Sensation  L-Spine Level: normal  S-Spine Level: normal    Other   She has no scoliosis .  Spinal Kyphosis:  Absent    Comments:  No pain with straight leg raise      Muscle Strength   Right Upper Extremity   Biceps: 5/5   Deltoid:  5/5  Triceps:  5/5  Left Upper Extremity  Biceps: 5/5   Deltoid:  5/5  Triceps:  5/5  Right Lower Extremity   Hip Flexion: 5/5   Quadriceps:  5/5   Ankle Dorsiflexion:  5/5   Anterior tibial:  5/5   EHL:  5/5  Left Lower Extremity   Hip Flexion: 5/5   Quadriceps:  5/5   Ankle Dorsiflexion:  5/5   Anterior tibial:  5/5   EHL:  5/5    Reflexes     Left Side  Biceps:  2+  Brachioradialis:  2+  Achilles:  2+  Ankle Clonus:  absent    Right Side   Biceps:  2+  Brachioradialis:  2+  Achilles:  2+  Ankle Clonus:  absent    Vascular Exam     Right Pulses        Carotid:                  2+    Left Pulses        Carotid:                   2+        Assessment:     1. DDD (degenerative disc disease), lumbar    2. Dorsalgia of lumbar region    3. Strain of lumbar region, initial encounter    4. Osteoarthritis of spine with radiculopathy, lumbar region         Plan:        Prior records reviewed today  We discussed back pain and the nature of back pain.  We discussed that it is not one thing that causes the pain but an accumulation of multiple things that we do.    Referral to physical therapy for evaluation and treatment of low back and leg pain and good home exercise program  Discussed trying Medrol Dosepak, but she deferred due to side effects  She can not take oral anti-inflammatories due to GI upset  Will try Rx for Voltaren gel as needed for pain  Consider lumbar MRI if no improvement with conservative treatment.  Pain appears to be consistent with an acute L5-S1 radiculopathy  We discussed posture sitting and the importance of trying to sit better.    We discussed the benefits of therapy and exercise and continuing to move.   Return for follow-up in 8 weeks    More than 50% of the total time  of 45 minutes was spent face to face in counseling on diagnosis and treatment options. I also counseled patient  on common and most usual side effect of prescribed medications.  I reviewed Primary care , and other specialty's notes to better coordinate patient's care. All questions were answered, and patient voiced understanding.      Follow-up: Follow up in about 8 weeks (around 9/5/2023). If there are any questions prior to this, the patient was instructed to contact the office.

## 2023-08-14 ENCOUNTER — CLINICAL SUPPORT (OUTPATIENT)
Dept: REHABILITATION | Facility: HOSPITAL | Age: 48
End: 2023-08-14
Payer: COMMERCIAL

## 2023-08-14 DIAGNOSIS — R29.898 WEAKNESS OF BACK: ICD-10-CM

## 2023-08-14 DIAGNOSIS — M47.26 OSTEOARTHRITIS OF SPINE WITH RADICULOPATHY, LUMBAR REGION: ICD-10-CM

## 2023-08-14 DIAGNOSIS — M51.36 DDD (DEGENERATIVE DISC DISEASE), LUMBAR: ICD-10-CM

## 2023-08-14 DIAGNOSIS — S39.012A STRAIN OF LUMBAR REGION, INITIAL ENCOUNTER: ICD-10-CM

## 2023-08-14 DIAGNOSIS — M62.81 WEAKNESS OF TRUNK MUSCULATURE: ICD-10-CM

## 2023-08-14 PROCEDURE — 97161 PT EVAL LOW COMPLEX 20 MIN: CPT | Mod: PO | Performed by: PHYSICAL THERAPIST

## 2023-08-14 NOTE — PROGRESS NOTES
[facility]   Physical Therapy Initial Evaluation     Date: 8/14/2023   Name: Karla Washington  Clinic Number: [id number]    Therapy Diagnosis:   Encounter Diagnoses   Name Primary?    DDD (degenerative disc disease), lumbar     Strain of lumbar region, initial encounter     Osteoarthritis of spine with radiculopathy, lumbar region     Weakness of back     Weakness of trunk musculature      Physician: Alfreda Renteria, NP    Physician Orders: PT Eval and Treat low back  Medical Diagnosis from Referral:   DDD (degenerative disc disease), lumbar [M51.36], Strain of lumbar region, initial encounter [S39.012A], Osteoarthritis of spine with radiculopathy, lumbar region [M47.26]  Evaluation Date: 8/14/2023  Authorization Period Expiration: 7/10/2024  Plan of Care Expiration: 11/14/2023  Progress Note Due: 9/14/2023  Visit # / Visits authorized: 1/ 1   FOTO: 1/ 3     Precautions: Standard    Time In: 1510  Time Out: 1610  Total Appointment Time (timed & untimed codes): 60 minutes      SUBJECTIVE       History of current condition: Karla reports there is a dull ache in the right  low back into the right gluteal. The pain is intermittent.       Date of onset: three months ago. Sudden onset. She says she was lifting a Kayak off the ground about a foot and experienced sudden pain. Presently improved. She says she did not have any pain last week but last night she side bent to the right to ;ull down the toilet seat and encountered immediate pain. Past history of back pain that started three years ago. Last episode was 6 months ago.   Falls: 0  Pain:  Current 2/10, worst 9/10, best 0/10   Location: right back  and buttocks    Description: Aching, Burning, Throbbing, and Sharp  Aggravating Factors: Getting out of bed/chair, transition movements   Easing Factors:  stretchng, movement and doing extensions, walking .   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   Imaging, X-Ray 7/11/2023:     The disc spaces are well preserved.  No fracture, spondylolisthesis or bone destruction identified        Medical History:   Past Medical History:   Diagnosis Date    Abnormal Pap smear of cervix     Angio-edema     Fibroid of cervix     History of colposcopy 1997    x2   1997, 2011       Surgical History:   Karla Washington  has a past surgical history that includes Tonsillectomy and Esophagogastroduodenoscopy (N/A, 6/9/2023).    Medications:   Karla has a current medication list which includes the following prescription(s): ammonium lactate, b complex vitamins, diclofenac, diclofenac sodium, epinephrine, probiotic, magnesium citrate, pantoprazole, and sucralfate.    Allergies:   Review of patient's allergies indicates:   Allergen Reactions    Pcn [penicillins] Hives          OBJECTIVE       Posture Alignment: right shoulder high, T-spine kyphosis reduced.   Sensation: Light Touch: Intact  Palpation: pain right lumbar PSM at level of L3.       Lumbar/Thoracic AROM: Pain/Dysfunction with Movement:   Flexion                           130/80 DN  spine symmetry, posterior weight shift   Extension                         15/5 DN     Right side bending              20              NP   Left side bending                 20 NP    Right rotation                         75%  DN   Left rotation                            75% DP       LOWER EXTREMITY STRENGTH:   Left  5/5 Right  5/5           DTR's:   Left Right   Patella  Tendon 2+ 2+   Achilles Tendon 2+ 2+           Selective Functional Movement Assessment:  FN: functional, non-painful  FP: functional, painful  DP: dysfunctional, painful  DN: dysfunctional, non-painful      Multi-Segmental Flexion: see above   Multi-Segmental Extension: see above     Multi-Segmental Rotation:   right see above   left see above         FUNCTIONAL MOVEMENT BREAKOUTS:  Long sitting  = DN spine symmetry   SLR   -Active   right FN  left FN    Knees to chest   Active - FN  Prone Rocking - FN   Press up  - DN  Seated Rotation  R- DN  L- FN  Lumbar exten / rot  Active  R- DN  L - DP  Passive  R - FN  L - FP    Lumbar locked rotation   Active  R - FN  L - FN  Hip extension   Active  R - FN  L - DN  Passive   R - FN  L - FN       FLEXIBILITY  Hamstrings  right no limitation   left no limitation     Piriformis       right no limitation   left no limitation     Hip flexors ( psoas)   / quads (rectus femoris) R no limitation    L no limitation   IT band no limitation   Back extensors no limitation         GAIT: ambulates with no assistive device with independently.     GAIT DEVIATIONS: displays no deviations                Limitation/Restriction for FOTO lumbar spine Survey    Therapist reviewed FOTO scores for Karla ROSALES Felix on 8/14/2023.   FOTO documents entered into [employer] - see Media section.    Limitation Score: 62%         TREATMENT     Total Treatment time (time-based codes) separate from Evaluation: 0 minutes      Karla received the treatments listed below:      therapeutic exercises to develop strength, ROM, posture, and core stabilization for - minutes including:  -    manual therapy techniques:  were applied to the: - for - minutes, including:  -    neuromuscular re-education activities to improve: Balance, Proprioception, and stability for - minutes. The following activities were included:  -    therapeutic activities to improve functional performance for -  minutes, including:  -    gait training to  improve functional mobility and safety for -  minutes, including:  -    PATIENT EDUCATION AND HOME EXERCISES     Education provided:   -    Written Home Exercises Provided: no.     ASSESSMENT     Karla is a 48 y.o. female referred to outpatient Physical Therapy with a medical diagnosis of DDD (degenerative disc disease), lumbar [M51.36], Strain of lumbar region, initial encounter [S39.012A], Osteoarthritis of spine with radiculopathy, lumbar region [M47.26]. Patient presents with clinical findings of a weight bearing spine flexion stability and motor control dysfunction, lumbar extension rotation pain dysfunction, thoracic extension rotation and a core stability motor control and or an active hip extension stability and motor control dysfunction.     Patient prognosis is Good.   Patient will benefit from skilled outpatient Physical Therapy to address the deficits stated above and in the chart below, provide patient /family education, and to maximize patientt's level of independence.     Plan of care discussed with patient: Yes  Patient's spiritual, cultural and educational needs considered and patient is agreeable to the plan of care and goals as stated below:     Anticipated Barriers for therapy: none     Medical Necessity is demonstrated by the following  History  Co-morbidities and personal factors that may impact the plan of care [x] LOW: no personal factors / co-morbidities  [] MODERATE: 1-2 personal factors / co-morbidities  [] HIGH: 3+ personal factors / co-morbidities    Moderate / High Support Documentation:   Co-morbidities affecting plan of care: none     Personal Factors:   no deficits     Examination  Body Structures and Functions, activity limitations and participation restrictions that may impact the plan of care [x] LOW: addressing 1-2 elements  [] MODERATE: 3+ elements  [] HIGH: 4+ elements (please support below)    Moderate / High Support Documentation: none     Clinical Presentation [x] LOW:  stable  [] MODERATE: Evolving  [] HIGH: Unstable     Decision Making/ Complexity Score: low       Goals:    Goals to be met:    Short Term GOALS: 5 weeks. Pt agrees with goals set.  1. Pts pain intensity decreased 20-40% for improved quality of life and functional mobilty. ONGOING  2. Pt to demonstrate core activation with spinal stability during transitional movements for improved quality of movement. ONGOING  3.  Pt demonstrates functional active hip extension on the left for restoring functional mobility.ONGOING  4. Patient demonstrates independence with HEP for self management . ONGOING  5. Patient demonstrates independence with Postural Awareness for control of pain.ONGOING  6. Pt demonstrates active lumbar extension rotation to the left w/o pain for improved functional mobility ONGOING    Long Term GOALS: 10 weeks. Pt agrees with goals set.  1. Patient demonstrates increased lumbar extension rotation mobility with  to restore functional mobility and tolerance to functional activities. ONGOING  2. Pt demonstrates standing extension and flexion with improved lumbar mobility to restore functional mobility. ONGOING  3. Patient demonstrates improved overall function and return demonstration to occupational activities and recreational lifestyle. ONGOING  4. Pt demonstrates  [place] with body mechanics to control episodes of pain associated with daily ADL and improve functional lifestyle. ONGOING  5. Pts pain level decreased to 75% or greater for with rising to stand from a bed or chair for restoring functional mobility and ADL. ONGOING   Plan     Plan of care Certification: 8/14/2023 to 11/14/2023.    Outpatient Physical Therapy 2 times weekly for 10 weeks to include the following interventions: Manual Therapy, Patient Education, Therapeutic Activities, and Therapeutic Exercise.     Magdy Lockett, PT

## 2023-08-16 NOTE — PLAN OF CARE
[facility]   Physical Therapy Initial Evaluation     Date: 8/14/2023   Name: Karla Washington  Clinic Number: [id number]    Therapy Diagnosis:   Encounter Diagnoses   Name Primary?    DDD (degenerative disc disease), lumbar     Strain of lumbar region, initial encounter     Osteoarthritis of spine with radiculopathy, lumbar region     Weakness of back     Weakness of trunk musculature      Physician: Alfreda Renteria, NP    Physician Orders: PT Eval and Treat low back  Medical Diagnosis from Referral:   DDD (degenerative disc disease), lumbar [M51.36], Strain of lumbar region, initial encounter [S39.012A], Osteoarthritis of spine with radiculopathy, lumbar region [M47.26]  Evaluation Date: 8/14/2023  Authorization Period Expiration: 7/10/2024  Plan of Care Expiration: 11/14/2023  Progress Note Due: 9/14/2023  Visit # / Visits authorized: 1/ 1   FOTO: 1/ 3     Precautions: Standard    Time In: 1510  Time Out: 1610  Total Appointment Time (timed & untimed codes): 60 minutes      SUBJECTIVE       History of current condition: Karla reports there is a dull ache in the right  low back into the right gluteal. The pain is intermittent.       Date of onset: three months ago. Sudden onset. She says she was lifting a Kayak off the ground about a foot and experienced sudden pain. Presently improved. She says she did not have any pain last week but last night she side bent to the right to ;ull down the toilet seat and encountered immediate pain. Past history of back pain that started three years ago. Last episode was 6 months ago.   Falls: 0  Pain:  Current 2/10, worst 9/10, best 0/10   Location: right back  and buttocks    Description: Aching, Burning, Throbbing, and Sharp  Aggravating Factors: Getting out of bed/chair, transition movements   Easing Factors: stretchng, movement and doing extensions, walking .   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   Imaging, X-Ray 7/11/2023:     The disc spaces are well preserved.  No fracture, spondylolisthesis or bone destruction identified        Medical History:   Past Medical History:   Diagnosis Date    Abnormal Pap smear of cervix     Angio-edema     Fibroid of cervix     History of colposcopy 1997    x2   1997, 2011       Surgical History:   Karla Washington  has a past surgical history that includes Tonsillectomy and Esophagogastroduodenoscopy (N/A, 6/9/2023).    Medications:   Karla has a current medication list which includes the following prescription(s): ammonium lactate, b complex vitamins, diclofenac, diclofenac sodium, epinephrine, probiotic, magnesium citrate, pantoprazole, and sucralfate.    Allergies:   Review of patient's allergies indicates:   Allergen Reactions    Pcn [penicillins] Hives          OBJECTIVE       Posture Alignment: right shoulder high, T-spine kyphosis reduced.   Sensation: Light Touch: Intact  Palpation: pain right lumbar PSM at level of L3.       Lumbar/Thoracic AROM: Pain/Dysfunction with Movement:   Flexion                           130/80 DN  spine symmetry, posterior weight shift   Extension                         15/5 DN     Right side bending              20              NP   Left side bending                 20 NP    Right rotation                         75%  DN   Left rotation                            75% DP       LOWER EXTREMITY STRENGTH:   Left  5/5 Right  5/5           DTR's:   Left Right   Patella  Tendon 2+ 2+   Achilles Tendon 2+ 2+           Selective Functional Movement Assessment:  FN: functional, non-painful  FP: functional, painful  DP: dysfunctional, painful  DN: dysfunctional, non-painful      Multi-Segmental Flexion: see above   Multi-Segmental Extension: see above     Multi-Segmental Rotation:   right see above   left see above         FUNCTIONAL MOVEMENT BREAKOUTS:  Long sitting  = DN spine symmetry   SLR   -Active   right FN  left FN    Knees to chest   Active - FN  Prone Rocking - FN   Press up  - DN  Seated Rotation  R- DN  L- FN  Lumbar exten / rot  Active  R- DN  L - DP  Passive  R - FN  L - FP    Lumbar locked rotation   Active  R - FN  L - FN  Hip extension   Active  R - FN  L - DN  Passive   R - FN  L - FN       FLEXIBILITY  Hamstrings  right no limitation   left no limitation     Piriformis       right no limitation   left no limitation     Hip flexors ( psoas)   / quads (rectus femoris) R no limitation    L no limitation   IT band no limitation   Back extensors no limitation         GAIT: ambulates with no assistive device with independently.     GAIT DEVIATIONS: displays no deviations                Limitation/Restriction for FOTO lumbar spine Survey    Therapist reviewed FOTO scores for Karla ROSALES Felix on 8/14/2023.   FOTO documents entered into [employer] - see Media section.    Limitation Score: 62%         TREATMENT     Total Treatment time (time-based codes) separate from Evaluation: 0 minutes      Karla received the treatments listed below:      therapeutic exercises to develop strength, ROM, posture, and core stabilization for - minutes including:  -    manual therapy techniques:  were applied to the: - for - minutes, including:  -    neuromuscular re-education activities to improve: Balance, Proprioception, and stability for - minutes. The following activities were included:  -    therapeutic activities to improve functional performance for -  minutes, including:  -    gait training to  improve functional mobility and safety for -  minutes, including:  -    PATIENT EDUCATION AND HOME EXERCISES     Education provided:   -    Written Home Exercises Provided: no.     ASSESSMENT     Karla is a 48 y.o. female referred to outpatient Physical Therapy with a medical diagnosis of DDD (degenerative disc disease), lumbar [M51.36], Strain of lumbar region, initial encounter [S39.012A], Osteoarthritis of spine with radiculopathy, lumbar region [M47.26]. Patient presents with clinical findings of a weight bearing spine flexion stability and motor control dysfunction, lumbar extension rotation pain dysfunction, thoracic extension rotation and a core stability motor control and or an active hip extension stability and motor control dysfunction.     Patient prognosis is Good.   Patient will benefit from skilled outpatient Physical Therapy to address the deficits stated above and in the chart below, provide patient /family education, and to maximize patientt's level of independence.     Plan of care discussed with patient: Yes  Patient's spiritual, cultural and educational needs considered and patient is agreeable to the plan of care and goals as stated below:     Anticipated Barriers for therapy: none     Medical Necessity is demonstrated by the following  History  Co-morbidities and personal factors that may impact the plan of care [x] LOW: no personal factors / co-morbidities  [] MODERATE: 1-2 personal factors / co-morbidities  [] HIGH: 3+ personal factors / co-morbidities    Moderate / High Support Documentation:   Co-morbidities affecting plan of care: none     Personal Factors:   no deficits     Examination  Body Structures and Functions, activity limitations and participation restrictions that may impact the plan of care [x] LOW: addressing 1-2 elements  [] MODERATE: 3+ elements  [] HIGH: 4+ elements (please support below)    Moderate / High Support Documentation: none     Clinical Presentation [x] LOW:  stable  [] MODERATE: Evolving  [] HIGH: Unstable     Decision Making/ Complexity Score: low       Goals:    Goals to be met:    Short Term GOALS: 5 weeks. Pt agrees with goals set.  1. Pts pain intensity decreased 20-40% for improved quality of life and functional mobilty. ONGOING  2. Pt to demonstrate core activation with spinal stability during transitional movements for improved quality of movement. ONGOING  3.  Pt demonstrates functional active hip extension on the left for restoring functional mobility.ONGOING  4. Patient demonstrates independence with HEP for self management . ONGOING  5. Patient demonstrates independence with Postural Awareness for control of pain.ONGOING  6. Pt demonstrates active lumbar extension rotation to the left w/o pain for improved functional mobility ONGOING    Long Term GOALS: 10 weeks. Pt agrees with goals set.  1. Patient demonstrates increased lumbar extension rotation mobility with  to restore functional mobility and tolerance to functional activities. ONGOING  2. Pt demonstrates standing extension and flexion with improved lumbar mobility to restore functional mobility. ONGOING  3. Patient demonstrates improved overall function and return demonstration to occupational activities and recreational lifestyle. ONGOING  4. Pt demonstrates  [place] with body mechanics to control episodes of pain associated with daily ADL and improve functional lifestyle. ONGOING  5. Pts pain level decreased to 75% or greater for with rising to stand from a bed or chair for restoring functional mobility and ADL. ONGOING   Plan     Plan of care Certification: 8/14/2023 to 11/14/2023.    Outpatient Physical Therapy 2 times weekly for 10 weeks to include the following interventions: Manual Therapy, Patient Education, Therapeutic Activities, and Therapeutic Exercise.     Magdy Lockett, PT

## 2023-08-30 ENCOUNTER — CLINICAL SUPPORT (OUTPATIENT)
Dept: REHABILITATION | Facility: HOSPITAL | Age: 48
End: 2023-08-30
Payer: COMMERCIAL

## 2023-08-30 DIAGNOSIS — R29.898 WEAKNESS OF BACK: Primary | ICD-10-CM

## 2023-08-30 DIAGNOSIS — M62.81 WEAKNESS OF TRUNK MUSCULATURE: ICD-10-CM

## 2023-08-30 PROCEDURE — 97140 MANUAL THERAPY 1/> REGIONS: CPT | Mod: PO | Performed by: PHYSICAL THERAPIST

## 2023-08-30 PROCEDURE — 97112 NEUROMUSCULAR REEDUCATION: CPT | Mod: PO | Performed by: PHYSICAL THERAPIST

## 2023-08-30 NOTE — PROGRESS NOTES
" [facility]PAT[facility]ER[facility] AND [facility]   Physical Therapy Treatment Note     Name: Karla Washington  Clinic Number: [id number]    Therapy Diagnosis: No diagnosis found.  Physician: Alfreda Renteria NP    Visit Date: 8/30/2023    Physician Orders: PT Eval and Treat low back  Medical Diagnosis from Referral:   DDD (degenerative disc disease), lumbar [M51.36], Strain of lumbar region, initial encounter [S39.012A], Osteoarthritis of spine with radiculopathy, lumbar region [M47.26]  Evaluation Date: 8/14/2023  Authorization Period Expiration: 7/10/2024  Plan of Care Expiration: 11/14/2023  Progress Note Due: 9/14/2023  Visit # / Visits authorized: 1/ 1   FOTO: 1/ 3   PTA Visit #: 0/5     Precautions: Standard    Time In: 1310  Time Out: 1410  Total Billable Time: 60 minutes      SUBJECTIVE     Pt reports: the back is a little twingy but not too bad. .  She was not issued a home exercise program.  Response to previous treatment: IE performed   Functional change: ongoing    Pain: 2/10 constant   Location: right low back       OBJECTIVE           TREATMENT        Karla received the treatments listed below:    - THERAPEUTIC EXERCISES to develop  strength, ROM, and flexibility for - minutes including       .BOLD INDICATES ACTIVITIES PERFORMED / DISCUSSED     Leg press   Recumbent bike  Upright bike   UE ergometer  Treadmill   Elliptical       THERAPEUTIC EXERCISE  SUPINE  -  -    SIDELYING  -  -    PRONE  -  -  STANDING.  -  SITTING  -      MANUAL THERAPY TECHNIQUES  were applied to the lumbar spine  for 15 minutes.  -cephalocaudad oscillations centrally and unilaterally - R   -SL left for oscillations and MOUNIKA of the trunk rotators       neuromuscular re-education activities to improve: Balance, motor control and stability  for 40 minutes. The following activities were included:  -core activation hold 10" x 12   CORE TRAINING   Level 1 hook lying   +limb touch   -Marching heel tap x15  -Kick outs x15  -SLR x15 " past opp knee   -dead bugs alt arm/leg x15  -LTR w/4#MB overhead.   -BLE kick outs w/MB overhead. X15    SIDE LYING   -Bilateral open book x15  -open book thoracic and lumbar biased x15 ea     +no limb touch   -Marching x15  -Kick outs x15  -SLR x15    Level 2 90/90  +limb touch   -Marching x15  -Kick outs x15  -SLR x15  +no limb touch   -Marching x15  -Kick outs x15  -SLR x15 -  therapeutic activities to improve functional performance for 0  minutes, including:  -       MODALITY      PATIENT EDUCATION AND HOME EXERCISES     Home Exercises Provided and Patient Education Provided     Education provided:   -Findings of evaluation and examination, and affect of these on plan for treatment  -Prognosis and expectations  -Home exercise program and expectations of therapy to be provided upon correct return demonstration of exercises.    Written Home Exercises Provided: no  ASSESSMENT     The patient performed the activities noted. She exhibited good core activation and did not encounter any pain associated with the exercises performed. Overall her ability to perform the exercises is enhanced by her piliates background. She did not have any challenge with the routine outlined.     Karla Is progressing towards her goals.   Pt prognosis is Good.     Pt will continue to benefit from skilled outpatient physical therapy to address the deficits listed in the problem list box on initial evaluation, provide pt/family education and to maximize pt's level of independence in the home and community environment.     Pt's spiritual, cultural and educational needs considered and pt agreeable to plan of care and goals.     Anticipated barriers to physical therapy: none     Goals:   Goals to be met:     Short Term GOALS: 5 weeks. Pt agrees with goals set.  1. Pts pain intensity decreased 20-40% for improved quality of life and functional mobilty. ONGOING  2. Pt to demonstrate core activation with spinal stability during transitional movements  for improved quality of movement. ONGOING  3.  Pt demonstrates functional active hip extension on the left for restoring functional mobility.ONGOING  4. Patient demonstrates independence with HEP for self management . ONGOING  5. Patient demonstrates independence with Postural Awareness for control of pain.ONGOING  6. Pt demonstrates active lumbar extension rotation to the left w/o pain for improved functional mobility ONGOING     Long Term GOALS: 10 weeks. Pt agrees with goals set.  1. Patient demonstrates increased lumbar extension rotation mobility with  to restore functional mobility and tolerance to functional activities. ONGOING  2. Pt demonstrates standing extension and flexion with improved lumbar mobility to restore functional mobility. ONGOING  3. Patient demonstrates improved overall function and return demonstration to occupational activities and recreational lifestyle. ONGOING  4. Pt demonstrates  [place] with body mechanics to control episodes of pain associated with daily ADL and improve functional lifestyle. ONGOING  5. Pts pain level decreased to 75% or greater for with rising to stand from a bed or chair for restoring functional mobility and ADL. ONGOING   Plan      Plan of care Certification: 8/14/2023 to 11/14/2023.     Outpatient Physical Therapy 2 times weekly for 10 weeks to include the following interventions: Manual Therapy, Patient Education, Therapeutic Activities, and Therapeutic Exercise.        Magdy Lockett, PT

## 2023-09-01 ENCOUNTER — CLINICAL SUPPORT (OUTPATIENT)
Dept: REHABILITATION | Facility: HOSPITAL | Age: 48
End: 2023-09-01
Payer: COMMERCIAL

## 2023-09-01 DIAGNOSIS — M62.81 WEAKNESS OF TRUNK MUSCULATURE: ICD-10-CM

## 2023-09-01 DIAGNOSIS — R29.898 WEAKNESS OF BACK: Primary | ICD-10-CM

## 2023-09-01 PROCEDURE — 97530 THERAPEUTIC ACTIVITIES: CPT | Mod: PO | Performed by: PHYSICAL THERAPIST

## 2023-09-01 PROCEDURE — 97140 MANUAL THERAPY 1/> REGIONS: CPT | Mod: PO | Performed by: PHYSICAL THERAPIST

## 2023-09-01 NOTE — PROGRESS NOTES
[facility]   Physical Therapy Treatment Note     Name: Karla Washington  Clinic Number: [id number]    Therapy Diagnosis:   Encounter Diagnoses   Name Primary?    Weakness of back Yes    Weakness of trunk musculature      Physician: Alfreda Renteria NP    Visit Date: 9/1/2023    Physician Orders: PT Eval and Treat low back  Medical Diagnosis from Referral:   DDD (degenerative disc disease), lumbar [M51.36], Strain of lumbar region, initial encounter [S39.012A], Osteoarthritis of spine with radiculopathy, lumbar region [M47.26]  Evaluation Date: 8/14/2023  Authorization Period Expiration: 7/10/2024  Plan of Care Expiration: 11/14/2023  Progress Note Due: 9/14/2023  Visit # / Visits authorized: 1/ 1,  2/20   FOTO: 1/ 3   PTA Visit #: 0/5     Precautions: Standard    Time In: 1115  Time Out: 1215  Total Billable Time: 60 minutes      SUBJECTIVE     Pt reports: she has some pain in the back this AM in the right side of the low back.   The pain is intermittent.Slept fine. Says she worked out pretty hard yesterday.     She was not issued a home exercise program.  Response to previous treatment: did fine     Functional change: ongoing    Pain: 3 /10 constant   Location: right low back       OBJECTIVE           TREATMENT        Karla received the treatments listed below:      .BOLD INDICATES ACTIVITIES PERFORMED / DISCUSSED     THERAPEUTIC EXERCISES to develop  strength, ROM, and flexibility for - minutes including   Leg press   Recumbent bike  Upright bike   UE ergometer  Treadmill   Elliptical     SUPINE  -  -    SIDELYING  -  -    PRONE  -  -  STANDING.  -  SITTING  -      MANUAL THERAPY TECHNIQUES  were applied to the lumbar spine  for 15 minutes.  -cephalocaudad oscillations centrally and unilaterally - R   -SL left for oscillations and MOUNIKA of the trunk rotators       neuromuscular re-education activities to improve: Balance, motor control and stability  for 45 minutes. The  "following activities were included:  -core activation hold 10" x 12   CORE TRAINING   Level 1 hook lying   +limb touch   -Marching heel tap     x15  -Kick outs  x15  -SLR           x15 past opp knee   -dead bugs alt arm/leg     x15  -LTR w/4#MB overhead.   -BLE kick outs w/MB overhead. X15    SIDE LYING   -Bilateral open book       x15  -open book thoracic and lumbar biased    x15 ea     +no limb touch   -Marching x15  -Kick outs x15  -SLR x15    Level 2 90/90  +limb touch   -Marching x15  -Kick outs x15  -SLR x15  +no limb touch   -Marching x15  -Kick outs x15  -SLR x15 -    Sitting  -Trunk rotation w/FMS red cord x15 end range to mid pos / mid pos to end range x15      therapeutic activities to improve functional performance for 0  minutes, including:  -    MODALITY      PATIENT EDUCATION AND HOME EXERCISES     Home Exercises Provided and Patient Education Provided     Education provided:   -Findings of evaluation and examination, and affect of these on plan for treatment  -Prognosis and expectations  -Home exercise program and expectations of therapy to be provided upon correct return demonstration of exercises.    Written Home Exercises Provided: no  ASSESSMENT      The routine was performed without difficulty. She exhibits good core activation and understands the concept of spine stability. Progressing very well with core training program.   She did not report pain at the end of the session.      Karla Is progressing towards her goals.   Pt prognosis is Good.     Pt will continue to benefit from skilled outpatient physical therapy to address the deficits listed in the problem list box on initial evaluation, provide pt/family education and to maximize pt's level of independence in the home and community environment.   Pt's spiritual, cultural and educational needs considered and pt agreeable to plan of care and goals.     Anticipated barriers to physical therapy: none     Goals:   Goals to be met:     Short Term " GOALS: 5 weeks. Pt agrees with goals set.  1. Pts pain intensity decreased 20-40% for improved quality of life and functional mobilty. ONGOING  2. Pt to demonstrate core activation with spinal stability during transitional movements for improved quality of movement. ONGOING  3.  Pt demonstrates functional active hip extension on the left for restoring functional mobility.ONGOING  4. Patient demonstrates independence with HEP for self management . ONGOING  5. Patient demonstrates independence with Postural Awareness for control of pain.ONGOING  6. Pt demonstrates active lumbar extension rotation to the left w/o pain for improved functional mobility ONGOING     Long Term GOALS: 10 weeks. Pt agrees with goals set.  1. Patient demonstrates increased lumbar extension rotation mobility with  to restore functional mobility and tolerance to functional activities. ONGOING  2. Pt demonstrates standing extension and flexion with improved lumbar mobility to restore functional mobility. ONGOING  3. Patient demonstrates improved overall function and return demonstration to occupational activities and recreational lifestyle. ONGOING  4. Pt demonstrates  [place] with body mechanics to control episodes of pain associated with daily ADL and improve functional lifestyle. ONGOING  5. Pts pain level decreased to 75% or greater for with rising to stand from a bed or chair for restoring functional mobility and ADL. ONGOING   Plan      Plan of care Certification: 8/14/2023 to 11/14/2023.     Outpatient Physical Therapy 2 times weekly for 10 weeks to include the following interventions: Manual Therapy, Patient Education, Therapeutic Activities, and Therapeutic Exercise.        Magdy Lockett, PT

## 2023-09-08 ENCOUNTER — CLINICAL SUPPORT (OUTPATIENT)
Dept: REHABILITATION | Facility: HOSPITAL | Age: 48
End: 2023-09-08
Payer: COMMERCIAL

## 2023-09-08 DIAGNOSIS — M62.81 WEAKNESS OF TRUNK MUSCULATURE: ICD-10-CM

## 2023-09-08 DIAGNOSIS — R29.898 WEAKNESS OF BACK: Primary | ICD-10-CM

## 2023-09-08 PROCEDURE — 97140 MANUAL THERAPY 1/> REGIONS: CPT | Mod: PO | Performed by: PHYSICAL THERAPIST

## 2023-09-08 PROCEDURE — 97110 THERAPEUTIC EXERCISES: CPT | Mod: PO | Performed by: PHYSICAL THERAPIST

## 2023-09-08 PROCEDURE — 97530 THERAPEUTIC ACTIVITIES: CPT | Mod: PO | Performed by: PHYSICAL THERAPIST

## 2023-09-08 NOTE — PROGRESS NOTES
[facility]   Physical Therapy Treatment Note     Name: Karla Washington  Clinic Number: [id number]    Therapy Diagnosis:   Encounter Diagnoses   Name Primary?    Weakness of back Yes    Weakness of trunk musculature      Physician: Alfreda Renteria NP    Visit Date: 9/8/2023    Physician Orders: PT Eval and Treat low back  Medical Diagnosis from Referral:   DDD (degenerative disc disease), lumbar [M51.36], Strain of lumbar region, initial encounter [S39.012A], Osteoarthritis of spine with radiculopathy, lumbar region [M47.26]  Evaluation Date: 8/14/2023  Authorization Period Expiration: 7/10/2024  Plan of Care Expiration: 11/14/2023  Progress Note Due: 9/14/2023  Visit # / Visits authorized: 1/ 1,  4/20   FOTO: 1/ 3   PTA Visit #: 0/5     Precautions: Standard    Time In: 1110  Time Out: 1210  Total Billable Time: 60 minutes      SUBJECTIVE     Pt reports: the back is a bit angry this AM. Started yesterday during the class ([employer]).      She was not issued a home exercise program.  Response to previous treatment: did fine, no pain    Functional change: ongoing    Pain: 5 /10 constant   Location: right low back       OBJECTIVE           TREATMENT        Karla received the treatments listed below:      .BOLD INDICATES ACTIVITIES PERFORMED / DISCUSSED     THERAPEUTIC EXERCISES to develop  strength, ROM, and flexibility for - minutes including   Leg press   Recumbent bike  Upright bike   UE ergometer 8' for segment rotation   Treadmill   Elliptical     SUPINE  -  -    SIDELYING  -  -    PRONE  -  -  STANDING.  -  SITTING  -      MANUAL THERAPY TECHNIQUES  were applied to the lumbar spine  for 15 minutes.  -cephalocaudad oscillations centrally and unilaterally - R   -SL for cephalocaudad oscillations - R   -SL left for oscillations and MOUNIKA of the trunk rotators   -SL for HVLATM right side and left side     neuromuscular re-education activities to improve: Balance, motor control  "and stability  for 35 minutes. The following activities were included:  -core activation hold 10" x 12   CORE TRAINING   Level 1 hook lying   +limb touch   -Marching heel tap     x15  -Kick outs  x15  -SLR           x15 past opp knee   -dead bugs alt arm/leg     x15  -LTR w/4#MB overhead.   -BLE kick outs w/MB overhead. X15    SIDE LYING   -Bilateral open book       x15  -open book thoracic and lumbar biased    x15 ea     +no limb touch   -Marching x15  -Kick outs x15  -SLR x15    Level 2 90/90  +limb touch   -Marching x15  -Kick outs x15  -SLR x15  +no limb touch   -Marching x15  -Kick outs x15  -SLR x15 -    Sitting  -Trunk rotation w/FMS red cord x15 end range to mid pos / mid pos to end range x15      therapeutic activities to improve functional performance for 0  minutes, including:  -    MODALITY      PATIENT EDUCATION AND HOME EXERCISES     Home Exercises Provided and Patient Education Provided     Education provided:   -Findings of evaluation and examination, and affect of these on plan for treatment  -Prognosis and expectations  -Home exercise program and expectations of therapy to be provided upon correct return demonstration of exercises.    Written Home Exercises Provided: no  ASSESSMENT      The patient performed the activities as noted.  She was not limited with any of the exercises performed. Movement quality is good. Demonstrating good core activation. Appeared to respond well to manual interventions.  She is not significantly mobility limited.  Progress with core program.      Karla Is progressing towards her goals.   Pt prognosis is Good.     Pt will continue to benefit from skilled outpatient physical therapy to address the deficits listed in the problem list box on initial evaluation, provide pt/family education and to maximize pt's level of independence in the home and community environment.   Pt's spiritual, cultural and educational needs considered and pt agreeable to plan of care and goals.   "   Anticipated barriers to physical therapy: none     Goals:   Goals to be met:     Short Term GOALS: 5 weeks. Pt agrees with goals set.  1. Pts pain intensity decreased 20-40% for improved quality of life and functional mobilty. ONGOING  2. Pt to demonstrate core activation with spinal stability during transitional movements for improved quality of movement. ONGOING  3.  Pt demonstrates functional active hip extension on the left for restoring functional mobility.ONGOING  4. Patient demonstrates independence with HEP for self management . ONGOING  5. Patient demonstrates independence with Postural Awareness for control of pain.ONGOING  6. Pt demonstrates active lumbar extension rotation to the left w/o pain for improved functional mobility ONGOING     Long Term GOALS: 10 weeks. Pt agrees with goals set.  1. Patient demonstrates increased lumbar extension rotation mobility with  to restore functional mobility and tolerance to functional activities. ONGOING  2. Pt demonstrates standing extension and flexion with improved lumbar mobility to restore functional mobility. ONGOING  3. Patient demonstrates improved overall function and return demonstration to occupational activities and recreational lifestyle. ONGOING  4. Pt demonstrates  [place] with body mechanics to control episodes of pain associated with daily ADL and improve functional lifestyle. ONGOING  5. Pts pain level decreased to 75% or greater for with rising to stand from a bed or chair for restoring functional mobility and ADL. ONGOING   Plan      Plan of care Certification: 8/14/2023 to 11/14/2023.     Outpatient Physical Therapy 2 times weekly for 10 weeks to include the following interventions: Manual Therapy, Patient Education, Therapeutic Activities, and Therapeutic Exercise.        Magdy Lockett, PT

## 2023-09-13 ENCOUNTER — CLINICAL SUPPORT (OUTPATIENT)
Dept: REHABILITATION | Facility: HOSPITAL | Age: 48
End: 2023-09-13
Payer: COMMERCIAL

## 2023-09-13 DIAGNOSIS — M62.81 WEAKNESS OF TRUNK MUSCULATURE: ICD-10-CM

## 2023-09-13 DIAGNOSIS — R29.898 WEAKNESS OF BACK: Primary | ICD-10-CM

## 2023-09-13 PROCEDURE — 97110 THERAPEUTIC EXERCISES: CPT | Mod: PO,CQ

## 2023-09-13 PROCEDURE — 97112 NEUROMUSCULAR REEDUCATION: CPT | Mod: PO,CQ

## 2023-09-13 NOTE — PROGRESS NOTES
[facility]   Physical Therapy Treatment Note     Name: Karla Washington  Clinic Number: [id number]    Therapy Diagnosis:   Encounter Diagnoses   Name Primary?    Weakness of back Yes    Weakness of trunk musculature      Physician: Alfreda Renteria NP    Visit Date: 9/13/2023    Physician Orders: PT Eval and Treat low back  Medical Diagnosis from Referral:   DDD (degenerative disc disease), lumbar [M51.36], Strain of lumbar region, initial encounter [S39.012A], Osteoarthritis of spine with radiculopathy, lumbar region [M47.26]  Evaluation Date: 8/14/2023  Authorization Period Expiration: 7/10/2024  Plan of Care Expiration: 11/14/2023  Progress Note Due: 9/14/2023  Visit # / Visits authorized: 1/ 1,  4/20   FOTO: 1/ 3   PTA Visit #: 1/5     Precautions: Standard    Time In: 2:50 (late arrival)  Time Out: 3:30  Total Billable Time: 40 minutes      SUBJECTIVE     Pt reports: no new complaints    She was not issued a home exercise program.  Response to previous treatment: did fine, no pain    Functional change: ongoing    Pain: 4 /10 constant   Location: right low back       OBJECTIVE       TREATMENT        Karla received the treatments listed below:      .BOLD INDICATES ACTIVITIES PERFORMED / DISCUSSED     THERAPEUTIC EXERCISES to develop  strength, ROM, and flexibility for 15 minutes including   Leg press   Recumbent bike  Upright bike   UE ergometer 8' for segment rotation   Treadmill   Elliptical     SUPINE  -Bridges x20    SIDELYING    PRONE    STANDING  -Paloff press with double GTB x20    SITTING      MANUAL THERAPY TECHNIQUES  were applied to the lumbar spine  for 0 minutes.  -cephalocaudad oscillations centrally and unilaterally - R   -SL for cephalocaudad oscillations - R  -SL left for oscillations and MOUNIKA of the trunk rotators   -SL for HVLATM right side and left side    neuromuscular re-education activities to improve: Balance, motor control and stability for 25 minutes. The  "following activities were included:  -core activation hold 10" x 12   CORE TRAINING   Level 1 hook lying    limb touch  -Marching heel tap     x15  -Kick outs  x15  -SLR           x15 past opp knee  -dead bugs alt arm/leg     x15  -LTR w/4#MB overhead x20  -BLE kick outs w/MB overhead. X15    SIDE LYING  -Bilateral open book      x15  -open book thoracic and lumbar biased  x15 ea     +no limb touch   -Marching x15  -Kick outs x15  -SLR x15    Level 2 90/90  +limb touch   -Marching x15  -Kick outs x15  -SLR x15  +no limb touch   -Marching x15  -Kick outs x15  -SLR x15 -    Sitting  -Trunk rotation w/FMS red cord x15 end range to mid pos / mid pos to end range x15    therapeutic activities to improve functional performance for 0  minutes, including:  -    MODALITY      PATIENT EDUCATION AND HOME EXERCISES     Home Exercises Provided and Patient Education Provided     Education provided:   -Findings of evaluation and examination, and affect of these on plan for treatment  -Prognosis and expectations  -Home exercise program and expectations of therapy to be provided upon correct return demonstration of exercises.    Written Home Exercises Provided: no  ASSESSMENT     Karla with good tolerance to treatment session and good carryover with exercises. She was not limited by pain with any of the exercises performed today and she displays good core activation. Added paloff press for continued core strengthening which she tolerated well. Continue to progress with core program per PT POC.     Karla Is progressing towards her goals.   Pt prognosis is Good.     Pt will continue to benefit from skilled outpatient physical therapy to address the deficits listed in the problem list box on initial evaluation, provide pt/family education and to maximize pt's level of independence in the home and community environment.   Pt's spiritual, cultural and educational needs considered and pt agreeable to plan of care and goals.     Anticipated " barriers to physical therapy: none     Goals:   Goals to be met:     Short Term GOALS: 5 weeks. Pt agrees with goals set.  1. Pts pain intensity decreased 20-40% for improved quality of life and functional mobilty. ONGOING  2. Pt to demonstrate core activation with spinal stability during transitional movements for improved quality of movement. ONGOING  3.  Pt demonstrates functional active hip extension on the left for restoring functional mobility.ONGOING  4. Patient demonstrates independence with HEP for self management . ONGOING  5. Patient demonstrates independence with Postural Awareness for control of pain.ONGOING  6. Pt demonstrates active lumbar extension rotation to the left w/o pain for improved functional mobility ONGOING     Long Term GOALS: 10 weeks. Pt agrees with goals set.  1. Patient demonstrates increased lumbar extension rotation mobility with  to restore functional mobility and tolerance to functional activities. ONGOING  2. Pt demonstrates standing extension and flexion with improved lumbar mobility to restore functional mobility. ONGOING  3. Patient demonstrates improved overall function and return demonstration to occupational activities and recreational lifestyle. ONGOING  4. Pt demonstrates  [place] with body mechanics to control episodes of pain associated with daily ADL and improve functional lifestyle. ONGOING  5. Pts pain level decreased to 75% or greater for with rising to stand from a bed or chair for restoring functional mobility and ADL. ONGOING   Plan      Plan of care Certification: 8/14/2023 to 11/14/2023.     Outpatient Physical Therapy 2 times weekly for 10 weeks to include the following interventions: Manual Therapy, Patient Education, Therapeutic Activities, and Therapeutic Exercise.        Rosario Billingsley, PTA

## 2023-09-20 ENCOUNTER — CLINICAL SUPPORT (OUTPATIENT)
Dept: REHABILITATION | Facility: HOSPITAL | Age: 48
End: 2023-09-20
Payer: COMMERCIAL

## 2023-09-20 DIAGNOSIS — M62.81 WEAKNESS OF TRUNK MUSCULATURE: ICD-10-CM

## 2023-09-20 DIAGNOSIS — R29.898 WEAKNESS OF BACK: Primary | ICD-10-CM

## 2023-09-20 PROCEDURE — 97110 THERAPEUTIC EXERCISES: CPT | Mod: PO,CQ

## 2023-09-20 PROCEDURE — 97112 NEUROMUSCULAR REEDUCATION: CPT | Mod: PO,CQ

## 2023-09-20 NOTE — PROGRESS NOTES
[facility]   Physical Therapy Treatment Note     Name: Karla Washington  Clinic Number: [id number]    Therapy Diagnosis:   Encounter Diagnoses   Name Primary?    Weakness of back Yes    Weakness of trunk musculature      Physician: Alfreda Renteria NP    Visit Date: 9/20/2023    Physician Orders: PT Eval and Treat low back  Medical Diagnosis from Referral:   DDD (degenerative disc disease), lumbar [M51.36], Strain of lumbar region, initial encounter [S39.012A], Osteoarthritis of spine with radiculopathy, lumbar region [M47.26]  Evaluation Date: 8/14/2023  Authorization Period Expiration: 7/10/2024  Plan of Care Expiration: 11/14/2023  Progress Note Due: 9/14/2023  Visit # / Visits authorized: 1/ 1,  5/20   FOTO: 1/ 3   PTA Visit #: 2/5     Precautions: Standard    Time In: 2:45   Time Out: 3:30  Total Billable Time: 45 minutes      SUBJECTIVE     Pt reports: she was on her feet all weekend and her back was 7/10 pain the following day. Feels better today.    She was not issued a home exercise program.  Response to previous treatment: did fine, no pain    Functional change: ongoing    Pain: 2 /10  Location: right low back       OBJECTIVE       TREATMENT        Karla received the treatments listed below:      .BOLD INDICATES ACTIVITIES PERFORMED / DISCUSSED     THERAPEUTIC EXERCISES to develop  strength, ROM, and flexibility for 20 minutes including   Leg press   Recumbent bike  Upright bike   UE ergometer 8' for segment rotation   Treadmill   Elliptical     SUPINE  -Bridges with GTB x20  -Quadruped bird/dog x20    SIDELYING    PRONE    STANDING  -Paloff press with double GTB x20  +Step ups with knee drive and 10# KB x15 B    SITTING      MANUAL THERAPY TECHNIQUES  were applied to the lumbar spine  for 0 minutes.  -cephalocaudad oscillations centrally and unilaterally - R   -SL for cephalocaudad oscillations - R  -SL left for oscillations and MOUNIKA of the trunk rotators   -SL for HVLATM  "right side and left side    neuromuscular re-education activities to improve: Balance, motor control and stability for 25 minutes. The following activities were included:  -core activation hold 10" x 12  CORE TRAINING  Level 1 hook lying    limb touch  -Marching heel tap     x15  -Kick outs  x15  -SLR 2# AW   x15 past opp knee  -dead bugs alt arm/leg   x15  -LTR w/4#MB overhead x20  -BLE kick outs w/4#MB overhead x15    SIDE LYING  -Bilateral open book    x10  -open book thoracic and lumbar biased  x10 ea     +no limb touch   -Marching x15  -Kick outs x15  -SLR x15    Level 2 90/90  +limb touch   -Marching x15  -Kick outs with 4# MB overhead x20  -SLR x15  +no limb touch   -Marching x15  -Kick outs x15  -SLR x15    Sitting  -Trunk rotation w/FMS red cord x15 end range to mid pos / mid pos to end range x15    therapeutic activities to improve functional performance for 0  minutes, including:  -    MODALITY      PATIENT EDUCATION AND HOME EXERCISES     Home Exercises Provided and Patient Education Provided     Education provided:   -Findings of evaluation and examination, and affect of these on plan for treatment  -Prognosis and expectations  -Home exercise program and expectations of therapy to be provided upon correct return demonstration of exercises.    Written Home Exercises Provided: no  ASSESSMENT     Karla remains with good tolerance to current treatment plan including progressions made today and increased workload. She remains challenged and has no issues or pain to report. Patient demonstrates good insight with all exercises and good core activation. Continue to progress with core program per PT POC.     Karla Is progressing towards her goals.   Pt prognosis is Good.     Pt will continue to benefit from skilled outpatient physical therapy to address the deficits listed in the problem list box on initial evaluation, provide pt/family education and to maximize pt's level of independence in the home and community " environment.   Pt's spiritual, cultural and educational needs considered and pt agreeable to plan of care and goals.     Anticipated barriers to physical therapy: none     Goals:   Goals to be met:     Short Term GOALS: 5 weeks. Pt agrees with goals set.  1. Pts pain intensity decreased 20-40% for improved quality of life and functional mobilty. ONGOING  2. Pt to demonstrate core activation with spinal stability during transitional movements for improved quality of movement. ONGOING  3.  Pt demonstrates functional active hip extension on the left for restoring functional mobility.ONGOING  4. Patient demonstrates independence with HEP for self management . ONGOING  5. Patient demonstrates independence with Postural Awareness for control of pain.ONGOING  6. Pt demonstrates active lumbar extension rotation to the left w/o pain for improved functional mobility ONGOING     Long Term GOALS: 10 weeks. Pt agrees with goals set.  1. Patient demonstrates increased lumbar extension rotation mobility with  to restore functional mobility and tolerance to functional activities. ONGOING  2. Pt demonstrates standing extension and flexion with improved lumbar mobility to restore functional mobility. ONGOING  3. Patient demonstrates improved overall function and return demonstration to occupational activities and recreational lifestyle. ONGOING  4. Pt demonstrates  [place] with body mechanics to control episodes of pain associated with daily ADL and improve functional lifestyle. ONGOING  5. Pts pain level decreased to 75% or greater for with rising to stand from a bed or chair for restoring functional mobility and ADL. ONGOING   Plan      Plan of care Certification: 8/14/2023 to 11/14/2023.     Outpatient Physical Therapy 2 times weekly for 10 weeks to include the following interventions: Manual Therapy, Patient Education, Therapeutic Activities, and Therapeutic Exercise.        Rosario Billingsley, PTA

## 2023-09-22 ENCOUNTER — CLINICAL SUPPORT (OUTPATIENT)
Dept: REHABILITATION | Facility: HOSPITAL | Age: 48
End: 2023-09-22
Payer: COMMERCIAL

## 2023-09-22 DIAGNOSIS — M62.81 WEAKNESS OF TRUNK MUSCULATURE: ICD-10-CM

## 2023-09-22 DIAGNOSIS — R29.898 WEAKNESS OF BACK: Primary | ICD-10-CM

## 2023-09-22 PROCEDURE — 97110 THERAPEUTIC EXERCISES: CPT | Mod: PO,CQ

## 2023-09-22 PROCEDURE — 97112 NEUROMUSCULAR REEDUCATION: CPT | Mod: PO,CQ

## 2023-09-22 NOTE — PROGRESS NOTES
[facility]   Physical Therapy Treatment Note     Name: Karla Washington  Clinic Number: [id number]    Therapy Diagnosis:   Encounter Diagnoses   Name Primary?    Weakness of back Yes    Weakness of trunk musculature      Physician: Alfreda Renteria NP    Visit Date: 9/22/2023    Physician Orders: PT Eval and Treat low back  Medical Diagnosis from Referral:   DDD (degenerative disc disease), lumbar [M51.36], Strain of lumbar region, initial encounter [S39.012A], Osteoarthritis of spine with radiculopathy, lumbar region [M47.26]  Evaluation Date: 8/14/2023  Authorization Period Expiration: 7/10/2024  Plan of Care Expiration: 11/14/2023  Progress Note Due: 9/14/2023  Visit # / Visits authorized: 1/ 1,  6/20   FOTO: 1/ 3   PTA Visit #: 2/5     Precautions: Standard    Time In: 12:50 (late arrival)  Time Out: 1:35  Total Billable Time: 45 minutes    SUBJECTIVE     Pt reports: she was a little sore after last visit but back feels good today.    She was not issued a home exercise program.  Response to previous treatment: mild soreness    Functional change: ongoing    Pain:  0/10  Location: right low back    OBJECTIVE       TREATMENT        Karla received the treatments listed below:      .BOLD INDICATES ACTIVITIES PERFORMED / DISCUSSED     THERAPEUTIC EXERCISES to develop  strength, ROM, and flexibility for 20 minutes including   Leg press   Recumbent bike  Upright bike   UE ergometer 8' for segment rotation   Treadmill  Elliptical    SUPINE  -Bridges with GTB x20  -Quadruped bird/dog x20    SIDELYING    PRONE    STANDING  -Paloff press with double GTB in lunge position x20  Step ups with knee drive and 10# KB x15 B    SITTING    MANUAL THERAPY TECHNIQUES  were applied to the lumbar spine  for 0 minutes.  -cephalocaudad oscillations centrally and unilaterally - R   -SL for cephalocaudad oscillations - R  -SL left for oscillations and MOUNIKA of the trunk rotators   -SL for HVLATM right side and  "left side    neuromuscular re-education activities to improve: Balance, motor control and stability for 25 minutes. The following activities were included:  -core activation hold 10" x 12  CORE TRAINING  Level 1 hook lying   limb touch  -Marching heel tap x15  -Kick outs  x15  -SLR 2# AW   x15 past opp knee  -dead bugs alt arm/leg x20  -LTR w/4#MB overhead x20  -BLE kick outs w/4#MB overhead x15    SIDE LYING  -Bilateral open book x10  -open book thoracic and lumbar biased  x10 ea    +no limb touch  -Marching x15  -Kick outs x15  -SLR x15    Level 2 90/90  +limb touch  -Marching w/4# MB overhead x20  -Kick outs with 4# MB overhead x20  +Alternating leg lowering with 4# MB overhead x20   -SLR x15  +no limb touch  -Marching x15  -Kick outs x15  -SLR x15    Sitting  -Trunk rotation w/FMS red cord x15 end range to mid pos / mid pos to end range x15    therapeutic activities to improve functional performance for 0  minutes, including:  -    MODALITY      PATIENT EDUCATION AND HOME EXERCISES     Home Exercises Provided and Patient Education Provided     Education provided:   -Findings of evaluation and examination, and affect of these on plan for treatment  -Prognosis and expectations  -Home exercise program and expectations of therapy to be provided upon correct return demonstration of exercises.    Written Home Exercises Provided: no  ASSESSMENT     Karla with good tolerance to continued progressions for core strengthening. She remains challenged by exercises today with reports of muscle fatigue indicating good therapeutic effect. Patient demonstrates good core engagement and insight with exercises. Continue to progress with core program per PT POC.     Karla Is progressing towards her goals.   Pt prognosis is Good.     Pt will continue to benefit from skilled outpatient physical therapy to address the deficits listed in the problem list box on initial evaluation, provide pt/family education and to maximize pt's level of " independence in the home and community environment.   Pt's spiritual, cultural and educational needs considered and pt agreeable to plan of care and goals.     Anticipated barriers to physical therapy: none     Goals:   Goals to be met:     Short Term GOALS: 5 weeks. Pt agrees with goals set.  1. Pts pain intensity decreased 20-40% for improved quality of life and functional mobilty. ONGOING  2. Pt to demonstrate core activation with spinal stability during transitional movements for improved quality of movement. ONGOING  3.  Pt demonstrates functional active hip extension on the left for restoring functional mobility.ONGOING  4. Patient demonstrates independence with HEP for self management . ONGOING  5. Patient demonstrates independence with Postural Awareness for control of pain.ONGOING  6. Pt demonstrates active lumbar extension rotation to the left w/o pain for improved functional mobility ONGOING     Long Term GOALS: 10 weeks. Pt agrees with goals set.  1. Patient demonstrates increased lumbar extension rotation mobility with  to restore functional mobility and tolerance to functional activities. ONGOING  2. Pt demonstrates standing extension and flexion with improved lumbar mobility to restore functional mobility. ONGOING  3. Patient demonstrates improved overall function and return demonstration to occupational activities and recreational lifestyle. ONGOING  4. Pt demonstrates  [place] with body mechanics to control episodes of pain associated with daily ADL and improve functional lifestyle. ONGOING  5. Pts pain level decreased to 75% or greater for with rising to stand from a bed or chair for restoring functional mobility and ADL. ONGOING   Plan      Plan of care Certification: 8/14/2023 to 11/14/2023.     Outpatient Physical Therapy 2 times weekly for 10 weeks to include the following interventions: Manual Therapy, Patient Education, Therapeutic Activities, and Therapeutic Exercise.        Rosario  Jose Cruz, PTA

## 2023-09-27 ENCOUNTER — CLINICAL SUPPORT (OUTPATIENT)
Dept: REHABILITATION | Facility: HOSPITAL | Age: 48
End: 2023-09-27
Payer: COMMERCIAL

## 2023-09-27 DIAGNOSIS — M62.81 WEAKNESS OF TRUNK MUSCULATURE: ICD-10-CM

## 2023-09-27 DIAGNOSIS — R29.898 WEAKNESS OF BACK: Primary | ICD-10-CM

## 2023-09-27 PROCEDURE — 97110 THERAPEUTIC EXERCISES: CPT | Mod: PO | Performed by: PHYSICAL THERAPIST

## 2023-09-27 PROCEDURE — 97112 NEUROMUSCULAR REEDUCATION: CPT | Mod: PO | Performed by: PHYSICAL THERAPIST

## 2023-09-27 NOTE — PROGRESS NOTES
[facility]   Physical Therapy Treatment Note     Name: Karla Washington  Clinic Number: [id number]    Therapy Diagnosis:   Encounter Diagnoses   Name Primary?    Weakness of back Yes    Weakness of trunk musculature      Physician: Alfreda Renteria NP    Visit Date: 9/27/2023    Physician Orders: PT Eval and Treat low back  Medical Diagnosis from Referral:   DDD (degenerative disc disease), lumbar [M51.36], Strain of lumbar region, initial encounter [S39.012A], Osteoarthritis of spine with radiculopathy, lumbar region [M47.26]  Evaluation Date: 8/14/2023  Authorization Period Expiration: 7/10/2024  Plan of Care Expiration: 11/14/2023  Progress Note Due: 9/14/2023  Visit # / Visits authorized: 1/ 1,  7/20   FOTO: 1/ 3   PTA Visit #: 2/5     Precautions: Standard    Time In: 1400  Time Out: 1500  Total Billable Time: 60 minutes    SUBJECTIVE     Pt reports: there  was some pain with extension earlier but no pain at the moment.     She was not issued a home exercise program.  Response to previous treatment: felt fine     Functional change: ongoing    Pain:  0/10  Location: right low back    OBJECTIVE       TREATMENT        Karla received the treatments listed below:      .BOLD INDICATES ACTIVITIES PERFORMED / DISCUSSED     THERAPEUTIC EXERCISES to develop  strength, ROM, and flexibility for 25 minutes including   Leg press   Recumbent bike  Upright bike   UE ergometer 8' for segment rotation   Treadmill  Elliptical    SUPINE  -Bridges with GTB x30    SIDELYING  -open book thoracic and lumbar biased  x10 ea  PRONE  -Quadruped bird/dog x20    STANDING  -Paloff press with double PTB in lunge position x20  -Step ups with knee drive and 10# KB x15 B    SITTING    MANUAL THERAPY TECHNIQUES  were applied to the lumbar spine  for 0 minutes.  -cephalocaudad oscillations centrally and unilaterally - R   -SL for cephalocaudad oscillations - R  -SL left for oscillations and MOUNIKA of the trunk rotators  "  -SL for HVLATM right side and left side    neuromuscular re-education activities to improve: Balance, motor control and stability for  35 minutes. The following activities were included:  -core activation hold 10" x 12  CORE TRAINING  Level 1 hook lying   limb touch  -Marching heel tap x15  -Kick outs  x15  -SLR 2# AW   x15 past opp knee  -dead bugs alt arm/leg x20  -LTR w/4#MB overhead x20  -BLE kick outs w/4#MB overhead x15  +no limb touch  -Marching x15  -Kick outs x15  -SLR x15    Level 2 90/90  +limb touch  -Marching w/4# MB overhead x20  -Kick outs with 4# MB overhead x20  +Alternating leg lowering with 4# MB overhead x20   -SLR x15  +no limb touch  -Marching x15  -Kick outs x15  -SLR x15    SIDE LYING  -Bilateral open book x10  -plank hold 10" x 6     PRONE  -Plank on forearms hold 15" x 8  -plank leg lift x15   -plank arm reach x6  = challenged    -plank  opp arm / leg lift  x15   -TB leg lifts x15  -TB swimmers arms only alternate x15  -TB trunk raise x15  -TB superman  x15   -TB opp arm / leg   Sitting  -Trunk rotation w/FMS red cord x15 end range to mid pos / mid pos to end range x15    therapeutic activities to improve functional performance for 0  minutes, including:  -    MODALITY      PATIENT EDUCATION AND HOME EXERCISES     Home Exercises Provided and Patient Education Provided     Education provided:   -Findings of evaluation and examination, and affect of these on plan for treatment  -Prognosis and expectations  -Home exercise program and expectations of therapy to be provided upon correct return demonstration of exercises.    Written Home Exercises Provided: no  ASSESSMENT     Performed the routine as noted. She is challenged with plan arm reach and TB superman. She does not encounter pain. Noted challenges with reciprocal cross patterned alternating movements in plank position. Core weakness persists. Progress with stabilization as well as trunk / core strengthening.     Karla Is progressing " towards her goals.   Pt prognosis is Good.     Pt will continue to benefit from skilled outpatient physical therapy to address the deficits listed in the problem list box on initial evaluation, provide pt/family education and to maximize pt's level of independence in the home and community environment.   Pt's spiritual, cultural and educational needs considered and pt agreeable to plan of care and goals.     Anticipated barriers to physical therapy: none     Goals:   Goals to be met:     Short Term GOALS: 5 weeks. Pt agrees with goals set.  1. Pts pain intensity decreased 20-40% for improved quality of life and functional mobilty. ONGOING  2. Pt to demonstrate core activation with spinal stability during transitional movements for improved quality of movement. ONGOING  3.  Pt demonstrates functional active hip extension on the left for restoring functional mobility.ONGOING  4. Patient demonstrates independence with HEP for self management . ONGOING  5. Patient demonstrates independence with Postural Awareness for control of pain.ONGOING  6. Pt demonstrates active lumbar extension rotation to the left w/o pain for improved functional mobility ONGOING     Long Term GOALS: 10 weeks. Pt agrees with goals set.  1. Patient demonstrates increased lumbar extension rotation mobility with  to restore functional mobility and tolerance to functional activities. ONGOING  2. Pt demonstrates standing extension and flexion with improved lumbar mobility to restore functional mobility. ONGOING  3. Patient demonstrates improved overall function and return demonstration to occupational activities and recreational lifestyle. ONGOING  4. Pt demonstrates  [place] with body mechanics to control episodes of pain associated with daily ADL and improve functional lifestyle. ONGOING  5. Pts pain level decreased to 75% or greater for with rising to stand from a bed or chair for restoring functional mobility and ADL. ONGOING   Plan      Plan  of care Certification: 8/14/2023 to 11/14/2023.     Outpatient Physical Therapy 2 times weekly for 10 weeks to include the following interventions: Manual Therapy, Patient Education, Therapeutic Activities, and Therapeutic Exercise.        Magdy Lockett, PT

## 2023-10-02 ENCOUNTER — CLINICAL SUPPORT (OUTPATIENT)
Dept: REHABILITATION | Facility: HOSPITAL | Age: 48
End: 2023-10-02
Payer: COMMERCIAL

## 2023-10-02 DIAGNOSIS — R29.898 WEAKNESS OF BACK: Primary | ICD-10-CM

## 2023-10-02 DIAGNOSIS — M62.81 WEAKNESS OF TRUNK MUSCULATURE: ICD-10-CM

## 2023-10-02 PROCEDURE — 97112 NEUROMUSCULAR REEDUCATION: CPT | Mod: PO | Performed by: PHYSICAL THERAPIST

## 2023-10-02 PROCEDURE — 97110 THERAPEUTIC EXERCISES: CPT | Mod: PO | Performed by: PHYSICAL THERAPIST

## 2023-10-02 NOTE — PROGRESS NOTES
[facility]   Physical Therapy Treatment Note     Name: Karla Washington  Clinic Number: [id number]    Therapy Diagnosis:   Encounter Diagnoses   Name Primary?    Weakness of back Yes    Weakness of trunk musculature      Physician: Alfreda Renteria NP    Visit Date: 10/2/2023    Physician Orders: PT Eval and Treat low back  Medical Diagnosis from Referral:   DDD (degenerative disc disease), lumbar [M51.36], Strain of lumbar region, initial encounter [S39.012A], Osteoarthritis of spine with radiculopathy, lumbar region [M47.26]  Evaluation Date: 8/14/2023  Authorization Period Expiration: 7/10/2024  Plan of Care Expiration: 11/14/2023  Progress Note Due: 9/14/2023  Visit # / Visits authorized: 1/ 1,  7/20   FOTO: 1/ 3   PTA Visit #: 2/5     Precautions: Standard    Time In: 1505  Time Out:   Total Billable Time:  minutes    SUBJECTIVE     Pt reports: the back still hurts off and on. There is pain with transition movements.     She was not issued a home exercise program.  Response to previous treatment: felt fine     Functional change: ongoing    Pain:  4 /10  Location: right low back    OBJECTIVE       TREATMENT        Karla received the treatments listed below:      .BOLD INDICATES ACTIVITIES PERFORMED / DISCUSSED     THERAPEUTIC EXERCISES to develop  strength, ROM, and flexibility for 25 minutes including   Leg press   Recumbent bike  Upright bike   UE ergometer 8' for segment rotation   Treadmill  Elliptical    SUPINE  -Bridges with GTB x30    SIDELYING  -open book thoracic and lumbar biased  x10 ea  PRONE  -Quadruped bird/dog x20    STANDING  -Paloff press with double PTB in lunge position x20  -Step ups with knee drive and 10# KB x15 B    SITTING    MANUAL THERAPY TECHNIQUES  were applied to the lumbar spine  for 0 minutes.  -cephalocaudad oscillations centrally and unilaterally - R   -SL for cephalocaudad oscillations - R  -SL left for oscillations and MOUNIKA of the trunk rotators  "  -SL for HVLATM right side and left side    neuromuscular re-education activities to improve: Balance, motor control and stability for  35 minutes. The following activities were included:  -core activation hold 10" x 12  CORE TRAINING  Level 1 hook lying   limb touch  -Marching heel tap x15  -Kick outs  x15  -SLR 2# AW   x15 past opp knee  -dead bugs alt arm/leg x20  -LTR w/4#MB overhead x20  -BLE kick outs w/4#MB overhead x15  +no limb touch  -Marching x15  -Kick outs x15  -SLR x15    Level 2 90/90  +limb touch  -Marching w/4# MB overhead x20  -Kick outs with 4# MB overhead x20  +Alternating leg lowering with 4# MB overhead x20   -SLR x15  +no limb touch  -Marching x15  -Kick outs x15  -SLR x15    SIDE LYING  -Bilateral open book x10  -plank hold 10" x 6     PRONE  -Plank on forearms hold 15" x 8  -plank leg lift x15   -plank arm reach x6  = challenged    -plank  opp arm / leg lift  x15   -TB leg lifts x15  -TB swimmers arms only alternate x15  -TB trunk raise x15  -TB superman  x15   -TB opp arm / leg   Sitting  -Trunk rotation w/FMS red cord x15 end range to mid pos / mid pos to end range x15    therapeutic activities to improve functional performance for 0  minutes, including:  -    MODALITY      PATIENT EDUCATION AND HOME EXERCISES     Home Exercises Provided and Patient Education Provided     Education provided:   -Findings of evaluation and examination, and affect of these on plan for treatment  -Prognosis and expectations  -Home exercise program and expectations of therapy to be provided upon correct return demonstration of exercises.    Written Home Exercises Provided: no  ASSESSMENT     The patient was able to perform the activities with good tolerance. She is executing all without limitation except for plank left arm and right leg lifts.  She is progressing with core / spine stability activities.     Karla Is progressing towards her goals.   Pt prognosis is Good.     Pt will continue to benefit from " skilled outpatient physical therapy to address the deficits listed in the problem list box on initial evaluation, provide pt/family education and to maximize pt's level of independence in the home and community environment.   Pt's spiritual, cultural and educational needs considered and pt agreeable to plan of care and goals.     Anticipated barriers to physical therapy: none     Goals:   Goals to be met:     Short Term GOALS: 5 weeks. Pt agrees with goals set.  1. Pts pain intensity decreased 20-40% for improved quality of life and functional mobilty. ONGOING  2. Pt to demonstrate core activation with spinal stability during transitional movements for improved quality of movement. ONGOING  3.  Pt demonstrates functional active hip extension on the left for restoring functional mobility.ONGOING  4. Patient demonstrates independence with HEP for self management . ONGOING  5. Patient demonstrates independence with Postural Awareness for control of pain.ONGOING  6. Pt demonstrates active lumbar extension rotation to the left w/o pain for improved functional mobility ONGOING     Long Term GOALS: 10 weeks. Pt agrees with goals set.  1. Patient demonstrates increased lumbar extension rotation mobility with  to restore functional mobility and tolerance to functional activities. ONGOING  2. Pt demonstrates standing extension and flexion with improved lumbar mobility to restore functional mobility. ONGOING  3. Patient demonstrates improved overall function and return demonstration to occupational activities and recreational lifestyle. ONGOING  4. Pt demonstrates  [place] with body mechanics to control episodes of pain associated with daily ADL and improve functional lifestyle. ONGOING  5. Pts pain level decreased to 75% or greater for with rising to stand from a bed or chair for restoring functional mobility and ADL. ONGOING   Plan      Plan of care Certification: 8/14/2023 to 11/14/2023.     Outpatient Physical Therapy  2 times weekly for 10 weeks to include the following interventions: Manual Therapy, Patient Education, Therapeutic Activities, and Therapeutic Exercise.        Magdy Lockett, PT

## 2023-10-06 ENCOUNTER — CLINICAL SUPPORT (OUTPATIENT)
Dept: REHABILITATION | Facility: HOSPITAL | Age: 48
End: 2023-10-06
Payer: COMMERCIAL

## 2023-10-06 DIAGNOSIS — R29.898 WEAKNESS OF BACK: Primary | ICD-10-CM

## 2023-10-06 DIAGNOSIS — M62.81 WEAKNESS OF TRUNK MUSCULATURE: ICD-10-CM

## 2023-10-06 PROCEDURE — 97110 THERAPEUTIC EXERCISES: CPT | Mod: PO | Performed by: PHYSICAL THERAPIST

## 2023-10-06 PROCEDURE — 97112 NEUROMUSCULAR REEDUCATION: CPT | Mod: PO | Performed by: PHYSICAL THERAPIST

## 2023-10-06 NOTE — PROGRESS NOTES
[facility]   Physical Therapy Treatment Note     Name: Karla Washington  Clinic Number: [id number]    Therapy Diagnosis:   Encounter Diagnoses   Name Primary?    Weakness of back Yes    Weakness of trunk musculature      Physician: Alfreda Renteria NP    Visit Date: 10/6/2023    Physician Orders: PT Eval and Treat low back  Medical Diagnosis from Referral:   DDD (degenerative disc disease), lumbar [M51.36], Strain of lumbar region, initial encounter [S39.012A], Osteoarthritis of spine with radiculopathy, lumbar region [M47.26]  Evaluation Date: 8/14/2023  Authorization Period Expiration: 7/10/2024  Plan of Care Expiration: 11/14/2023  Progress Note Due: 9/14/2023  Visit # / Visits authorized: 1/ 1,  7/20   FOTO: 1/ 3   PTA Visit #: 2/5     Precautions: Standard    Time In: 1005  Time Out: 1110  Total Billable Time: 65 minutes    SUBJECTIVE     Pt reports: no pain. The back is feeling pretty good.     She was not issued a home exercise program.  Response to previous treatment: felt fine. Gluteals were sore.     Functional change: ongoing    Pain:  0 /10  Location: right low back    OBJECTIVE       TREATMENT        Karla received the treatments listed below:      .BOLD INDICATES ACTIVITIES PERFORMED / DISCUSSED     THERAPEUTIC EXERCISES to develop  strength, ROM, and flexibility for 20 minutes including   Leg press   Recumbent bike  Upright bike   UE ergometer 8' for segment rotation   Treadmill  Elliptical    SUPINE  -Bridges with GTB x30    SIDELYING  -open book thoracic and lumbar biased  x10 ea  PRONE  -Quadruped bird/dog x20    STANDING  -Paloff press with double PTB in lunge position x20  -Step ups with knee drive and 10# KB x15 B    SITTING    MANUAL THERAPY TECHNIQUES  were applied to the lumbar spine  for 0 minutes.  -cephalocaudad oscillations centrally and unilaterally - R   -SL for cephalocaudad oscillations - R  -SL left for oscillations and MOUNIKA of the trunk rotators   -SL  "for HVLATM right side and left side    NEUROMUSCULAR RE-EDUCATION activities to improve: Balance, motor control and stability for  40 minutes. The following activities were included:  -core activation hold 10" x 12  CORE TRAINING  Level 1 hook lying   limb touch  -Marching heel tap x15  -Kick outs  x15  -SLR 2# AW   x15 past opp knee  -dead bugs alt arm/leg x20  -LTR w/4#MB overhead x20  -BLE kick outs w/4#MB overhead x15  +no limb touch  -Marching x15  -Kick outs x15  -SLR x15    Level 2 90/90  +limb touch  -Marching w/4# MB overhead x20  -Kick outs with 4# MB overhead x20  +Alternating leg lowering with 4# MB overhead x20   -SLR x15  +no limb touch  -Marching x15  -Kick outs x15  -SLR x15    SIDE LYING  -Bilateral open book x10  -plank hold 10" x 6     PRONE  -Plank on forearms hold 15" x 8  -plank leg lift x15   -plank arm reach x6  = challenged    -plank  opp arm / leg lift  x15   -TB leg lifts x15  -TB swimmers arms only alternate x15  -TB trunk raise x15  -TB superman  x15   -TB opp arm / leg     Sitting  -Trunk rotation w/FMS red cord x15 end range to mid pos / mid pos to end range x15    therapeutic activities to improve functional performance for 0  minutes, including:  -    MODALITY      PATIENT EDUCATION AND HOME EXERCISES     Home Exercises Provided and Patient Education Provided     Education provided:   -Findings of evaluation and examination, and affect of these on plan for treatment  -Prognosis and expectations  -Home exercise program and expectations of therapy to be provided upon correct return demonstration of exercises.    Written Home Exercises Provided: no  ASSESSMENT     The patient performed all activities noted. Pain is not produced during the movements. She is progressing with plank activities and is demonstrating alternating arm/leg lifts in plank position. Progress is very favorable.       Karla Is progressing towards her goals.   Pt prognosis is Good.     Pt will continue to benefit from " skilled outpatient physical therapy to address the deficits listed in the problem list box on initial evaluation, provide pt/family education and to maximize pt's level of independence in the home and community environment.   Pt's spiritual, cultural and educational needs considered and pt agreeable to plan of care and goals.     Anticipated barriers to physical therapy: none     Goals:   Goals to be met:     Short Term GOALS: 5 weeks. Pt agrees with goals set.  1. Pts pain intensity decreased 20-40% for improved quality of life and functional mobilty. ONGOING  2. Pt to demonstrate core activation with spinal stability during transitional movements for improved quality of movement. ONGOING  3.  Pt demonstrates functional active hip extension on the left for restoring functional mobility.ONGOING  4. Patient demonstrates independence with HEP for self management . ONGOING  5. Patient demonstrates independence with Postural Awareness for control of pain.ONGOING  6. Pt demonstrates active lumbar extension rotation to the left w/o pain for improved functional mobility ONGOING     Long Term GOALS: 10 weeks. Pt agrees with goals set.  1. Patient demonstrates increased lumbar extension rotation mobility with  to restore functional mobility and tolerance to functional activities. ONGOING  2. Pt demonstrates standing extension and flexion with improved lumbar mobility to restore functional mobility. ONGOING  3. Patient demonstrates improved overall function and return demonstration to occupational activities and recreational lifestyle. ONGOING  4. Pt demonstrates  [place] with body mechanics to control episodes of pain associated with daily ADL and improve functional lifestyle. ONGOING  5. Pts pain level decreased to 75% or greater for with rising to stand from a bed or chair for restoring functional mobility and ADL. ONGOING   Plan      Plan of care Certification: 8/14/2023 to 11/14/2023.     Outpatient Physical Therapy  2 times weekly for 10 weeks to include the following interventions: Manual Therapy, Patient Education, Therapeutic Activities, and Therapeutic Exercise.        Magdy Lockett, PT

## 2023-10-11 ENCOUNTER — CLINICAL SUPPORT (OUTPATIENT)
Dept: REHABILITATION | Facility: HOSPITAL | Age: 48
End: 2023-10-11
Payer: COMMERCIAL

## 2023-10-11 DIAGNOSIS — R29.898 WEAKNESS OF BACK: Primary | ICD-10-CM

## 2023-10-11 DIAGNOSIS — M62.81 WEAKNESS OF TRUNK MUSCULATURE: ICD-10-CM

## 2023-10-11 PROCEDURE — 97112 NEUROMUSCULAR REEDUCATION: CPT | Mod: PO | Performed by: PHYSICAL THERAPIST

## 2023-10-11 PROCEDURE — 97110 THERAPEUTIC EXERCISES: CPT | Mod: PO | Performed by: PHYSICAL THERAPIST

## 2023-10-11 NOTE — PROGRESS NOTES
[facility]   Physical Therapy Treatment Note     Name: Karla Washington  Clinic Number: [id number]    Therapy Diagnosis:   Encounter Diagnoses   Name Primary?    Weakness of back Yes    Weakness of trunk musculature      Physician: Alfreda Renteria NP    Visit Date: 10/11/2023    Physician Orders: PT Eval and Treat low back  Medical Diagnosis from Referral:   DDD (degenerative disc disease), lumbar [M51.36], Strain of lumbar region, initial encounter [S39.012A], Osteoarthritis of spine with radiculopathy, lumbar region [M47.26]  Evaluation Date: 8/14/2023  Authorization Period Expiration: 7/10/2024  Plan of Care Expiration: 11/14/2023  Progress Note Due: 9/14/2023  Visit # / Visits authorized: 1/ 1,  10/20   FOTO: 1/ 3   PTA Visit #: 2/5     Precautions: Standard    Time In: 1405  Time Out: 1455  Total Billable Time:  50 minutes    SUBJECTIVE     Pt reports: some pain in the whole low back. The pain today is constant. Had a stomach flu.       She was not issued a home exercise program.  Response to previous treatment: felt fine. Gluteals were sore.     Functional change: ongoing    Pain:  0 /10  Location: right low back    OBJECTIVE       TREATMENT        Karla received the treatments listed below:      .BOLD INDICATES ACTIVITIES PERFORMED / DISCUSSED     THERAPEUTIC EXERCISES to develop  strength, ROM, and flexibility for 15 minutes including   Leg press   Recumbent bike  Upright bike   UE ergometer 8' for segment rotation   Treadmill  Elliptical    SUPINE  -Bridges with GTB x30    SIDELYING  -open book thoracic and lumbar biased  x10 ea  PRONE  -Quadruped bird/dog x20    STANDING  -Paloff press with double PTB in lunge position x20  -Step ups with knee drive and 10# KB x15 B    SITTING    MANUAL THERAPY TECHNIQUES  were applied to the lumbar spine  for 0 minutes.  -cephalocaudad oscillations centrally and unilaterally - R   -SL for cephalocaudad oscillations - R  -SL left for  "oscillations and MOUNIKA of the trunk rotators   -SL for HVLATM right side and left side    NEUROMUSCULAR RE-EDUCATION activities to improve: Balance, motor control and stability for  40 minutes. The following activities were included:  -core activation hold 10" x 12  CORE TRAINING  Level 1 hook lying   limb touch  -Marching heel tap x15  -Kick outs  x15  -SLR 2# AW   x15 past opp knee  -dead bugs alt arm/leg x20  -LTR w/4#MB overhead x20  -BLE kick outs w/4#MB overhead x15  +no limb touch  -Marching x15  -Kick outs x15  -SLR x15    Level 2 90/90  +limb touch  -Marching w/4# MB overhead x20  -Kick outs with 4# MB overhead x20  +Alternating leg lowering with 4# MB overhead x20   -SLR x15  +no limb touch  -Marching x15  -Kick outs x15  -SLR x15    SIDE LYING  -Bilateral open book x10  -plank hold 10" x 6     PRONE  -Plank on forearms hold 15" x 8  -plank leg lift x15   -plank arm reach x6  = challenged    -plank  opp arm / leg lift  x15   -TB leg lifts x15  -TB swimmers arms only alternate x15  -TB trunk raise x15  -TB superman  x15   -TB opp arm / leg     Sitting  -Trunk rotation w/FMS red cord x15 end range to mid pos / mid pos to end range x15    therapeutic activities to improve functional performance for 0  minutes, including:  -    MODALITY      PATIENT EDUCATION AND HOME EXERCISES     Home Exercises Provided and Patient Education Provided     Education provided:   -Findings of evaluation and examination, and affect of these on plan for treatment  -Prognosis and expectations  -Home exercise program and expectations of therapy to be provided upon correct return demonstration of exercises.    Written Home Exercises Provided: no  ASSESSMENT     The patient exhibited good return demonstration of the exercises performed. She did not require any significant cueing and exhibits good mobility. Improving with good motor control of her exercises in the plank position and over the TB.   Karla Is progressing towards her goals. "   Pt prognosis is Good.     Pt will continue to benefit from skilled outpatient physical therapy to address the deficits listed in the problem list box on initial evaluation, provide pt/family education and to maximize pt's level of independence in the home and community environment.   Pt's spiritual, cultural and educational needs considered and pt agreeable to plan of care and goals.     Anticipated barriers to physical therapy: none     Goals:   Goals to be met:     Short Term GOALS: 5 weeks. Pt agrees with goals set.  1. Pts pain intensity decreased 20-40% for improved quality of life and functional mobilty. ONGOING  2. Pt to demonstrate core activation with spinal stability during transitional movements for improved quality of movement. ONGOING  3.  Pt demonstrates functional active hip extension on the left for restoring functional mobility.ONGOING  4. Patient demonstrates independence with HEP for self management . ONGOING  5. Patient demonstrates independence with Postural Awareness for control of pain.ONGOING  6. Pt demonstrates active lumbar extension rotation to the left w/o pain for improved functional mobility ONGOING     Long Term GOALS: 10 weeks. Pt agrees with goals set.  1. Patient demonstrates increased lumbar extension rotation mobility with  to restore functional mobility and tolerance to functional activities. ONGOING  2. Pt demonstrates standing extension and flexion with improved lumbar mobility to restore functional mobility. ONGOING  3. Patient demonstrates improved overall function and return demonstration to occupational activities and recreational lifestyle. ONGOING  4. Pt demonstrates  [place] with body mechanics to control episodes of pain associated with daily ADL and improve functional lifestyle. ONGOING  5. Pts pain level decreased to 75% or greater for with rising to stand from a bed or chair for restoring functional mobility and ADL. ONGOING   Plan      Plan of care  Certification: 8/14/2023 to 11/14/2023.     Outpatient Physical Therapy 2 times weekly for 10 weeks to include the following interventions: Manual Therapy, Patient Education, Therapeutic Activities, and Therapeutic Exercise.        Magdy Lockett, PT

## 2023-10-13 ENCOUNTER — CLINICAL SUPPORT (OUTPATIENT)
Dept: REHABILITATION | Facility: HOSPITAL | Age: 48
End: 2023-10-13
Payer: COMMERCIAL

## 2023-10-13 DIAGNOSIS — R29.898 WEAKNESS OF BACK: Primary | ICD-10-CM

## 2023-10-13 DIAGNOSIS — M62.81 WEAKNESS OF TRUNK MUSCULATURE: ICD-10-CM

## 2023-10-13 PROCEDURE — 97112 NEUROMUSCULAR REEDUCATION: CPT | Mod: PO,CQ

## 2023-10-13 PROCEDURE — 97110 THERAPEUTIC EXERCISES: CPT | Mod: PO,CQ

## 2023-10-13 NOTE — PROGRESS NOTES
[facility]   Physical Therapy Treatment Note     Name: Karla Washington  Clinic Number: [id number]    Therapy Diagnosis:   Encounter Diagnoses   Name Primary?    Weakness of back Yes    Weakness of trunk musculature      Physician: Alfreda Renteria NP    Visit Date: 10/13/2023    Physician Orders: PT Eval and Treat low back  Medical Diagnosis from Referral:   DDD (degenerative disc disease), lumbar [M51.36], Strain of lumbar region, initial encounter [S39.012A], Osteoarthritis of spine with radiculopathy, lumbar region [M47.26]  Evaluation Date: 8/14/2023  Authorization Period Expiration: 7/10/2024  Plan of Care Expiration: 11/14/2023  Progress Note Due: 9/14/2023  Visit # / Visits authorized: 1/ 1,  11/20   FOTO: 2/ 3 - last completed on 10/13/2023  PTA Visit #: 1/5     Precautions: Standard    Time In: 7:05  Time Out: 7:55  Total Billable Time:  50 minutes    SUBJECTIVE     Pt reports: some pain in the low back.    She was not issued a home exercise program.  Response to previous treatment: felt fine. Gluteals were sore.     Functional change: ongoing    Pain:  2-3/10  Location: right low back    OBJECTIVE       TREATMENT        Karla received the treatments listed below:    .BOLD INDICATES ACTIVITIES PERFORMED / DISCUSSED     THERAPEUTIC EXERCISES to develop  strength, ROM, and flexibility for 15 minutes including   Leg press   Recumbent bike  Upright bike   UE ergometer 8' for segment rotation   Treadmill  Elliptical    SUPINE  -Bridges with GTB x30    SIDELYING  -open book thoracic and lumbar biased  x10 ea    PRONE  -Quadruped bird/dog x20    STANDING  -Paloff press with rotation with double PTB in lunge position x20  -Step ups with knee drive and 10# KB x15 B    SITTING    MANUAL THERAPY TECHNIQUES  were applied to the lumbar spine  for 0 minutes.  -cephalocaudad oscillations centrally and unilaterally - R   -SL for cephalocaudad oscillations - R  -SL left for oscillations and  "MOUNIKA of the trunk rotators   -SL for HVLATM right side and left side    NEUROMUSCULAR RE-EDUCATION activities to improve: Balance, motor control and stability for 40 minutes. The following activities were included:  -core activation hold 10" x 12  CORE TRAINING  Level 1 hook lying   limb touch  -Marching heel tap x15  -Kick outs  x15  -SLR 2# AW   x15 past opp knee  -dead bugs alt arm/leg x20  -LTR w/4#MB overhead x20  -BLE kick outs w/4#MB overhead x15  +no limb touch  -Marching x15  -Kick outs x15  -SLR x15    Level 2 90/90  +limb touch  -Marching w/4# MB overhead x20  -Kick outs with 4# MB overhead x20  -Alternating leg lowering with 4# MB overhead x20   -SLR x15  +no limb touch  -Marching x15  -Kick outs x15  -SLR x15    SIDE LYING  -Bilateral open book x10  -plank hold 10" x 6    PRONE  -Plank on forearms hold 15" x 8  -plank leg lift x15  -plank arm reach x6  = challenged  -plank opp arm / leg lift x12  -TB leg lifts x15  -TB swimmers arms only alternate x15  -TB trunk raise x15  -TB superman x15  -TB opp arm / leg x15    Sitting  -Trunk rotation w/FMS red cord x15 end range to mid pos / mid pos to end range x15    therapeutic activities to improve functional performance for 0  minutes, including:  -    MODALITY      PATIENT EDUCATION AND HOME EXERCISES     Home Exercises Provided and Patient Education Provided     Education provided:   -Findings of evaluation and examination, and affect of these on plan for treatment  -Prognosis and expectations  -Home exercise program and expectations of therapy to be provided upon correct return demonstration of exercises.    Written Home Exercises Provided: no  ASSESSMENT     The patient tolerated session well despite reports of muscle fatigue indicating good therapeutic effect. She remains challenged by planks but exhibits good execution and insight with core engagement. She continues to improve with good motor control of her exercises in the plank position.    Karla Is " progressing towards her goals.   Pt prognosis is Good.     Pt will continue to benefit from skilled outpatient physical therapy to address the deficits listed in the problem list box on initial evaluation, provide pt/family education and to maximize pt's level of independence in the home and community environment.   Pt's spiritual, cultural and educational needs considered and pt agreeable to plan of care and goals.     Anticipated barriers to physical therapy: none     Goals:   Goals to be met:     Short Term GOALS: 5 weeks. Pt agrees with goals set.  1. Pts pain intensity decreased 20-40% for improved quality of life and functional mobilty. ONGOING  2. Pt to demonstrate core activation with spinal stability during transitional movements for improved quality of movement. ONGOING  3.  Pt demonstrates functional active hip extension on the left for restoring functional mobility.ONGOING  4. Patient demonstrates independence with HEP for self management . ONGOING  5. Patient demonstrates independence with Postural Awareness for control of pain.ONGOING  6. Pt demonstrates active lumbar extension rotation to the left w/o pain for improved functional mobility ONGOING     Long Term GOALS: 10 weeks. Pt agrees with goals set.  1. Patient demonstrates increased lumbar extension rotation mobility with  to restore functional mobility and tolerance to functional activities. ONGOING  2. Pt demonstrates standing extension and flexion with improved lumbar mobility to restore functional mobility. ONGOING  3. Patient demonstrates improved overall function and return demonstration to occupational activities and recreational lifestyle. ONGOING  4. Pt demonstrates  [place] with body mechanics to control episodes of pain associated with daily ADL and improve functional lifestyle. ONGOING  5. Pts pain level decreased to 75% or greater for with rising to stand from a bed or chair for restoring functional mobility and ADL. ONGOING    Plan      Plan of care Certification: 8/14/2023 to 11/14/2023.     Outpatient Physical Therapy 2 times weekly for 10 weeks to include the following interventions: Manual Therapy, Patient Education, Therapeutic Activities, and Therapeutic Exercise.        Rosario Billingsley, PTA

## 2023-10-25 ENCOUNTER — CLINICAL SUPPORT (OUTPATIENT)
Dept: REHABILITATION | Facility: HOSPITAL | Age: 48
End: 2023-10-25
Payer: COMMERCIAL

## 2023-10-25 DIAGNOSIS — M62.81 WEAKNESS OF TRUNK MUSCULATURE: ICD-10-CM

## 2023-10-25 DIAGNOSIS — R29.898 WEAKNESS OF BACK: Primary | ICD-10-CM

## 2023-10-25 PROCEDURE — 97112 NEUROMUSCULAR REEDUCATION: CPT | Mod: PO | Performed by: PHYSICAL THERAPIST

## 2023-10-25 PROCEDURE — 97110 THERAPEUTIC EXERCISES: CPT | Mod: PO | Performed by: PHYSICAL THERAPIST

## 2023-10-25 NOTE — PROGRESS NOTES
[facility]   Physical Therapy Treatment Note     Name: Karla Washington  Clinic Number: [id number]    Therapy Diagnosis:   Encounter Diagnoses   Name Primary?    Weakness of back Yes    Weakness of trunk musculature      Physician: Alfreda Renteria NP    Visit Date: 10/25/2023    Physician Orders: PT Eval and Treat low back  Medical Diagnosis from Referral:   DDD (degenerative disc disease), lumbar [M51.36], Strain of lumbar region, initial encounter [S39.012A], Osteoarthritis of spine with radiculopathy, lumbar region [M47.26]  Evaluation Date: 8/14/2023  Authorization Period Expiration: 7/10/2024  Plan of Care Expiration: 11/14/2023  Progress Note Due: 9/14/2023  Visit # / Visits authorized: 1/ 1,  12 / 20   FOTO: 2/ 3 - last completed on 10/13/2023  PTA Visit #: 1/5     Precautions: Standard    Time In: 1610  Time Out: 1705  Total Billable Time: 55  minutes    SUBJECTIVE     Pt reports: no back pain. She says the  back is feeling so much better.     She was not issued a home exercise program.  Response to previous treatment: felt fine. Gluteals were sore.     Functional change: no limitation with activity  .   Pain:  0/10  Location: right low back    OBJECTIVE       TREATMENT        Karla received the treatments listed below:    .BOLD INDICATES ACTIVITIES PERFORMED / DISCUSSED     THERAPEUTIC EXERCISES to develop  strength, ROM, and flexibility for 15 minutes including   Leg press   Recumbent bike  Upright bike   UE ergometer 8' for segment rotation   Treadmill  Elliptical    SUPINE  -Bridges with GTB x30    SIDELYING  -open book thoracic and lumbar biased  x15 ea w & w/o 4#MB     PRONE  -Quadruped bird/dog x20    STANDING  -Paloff press with rotation with double PTB in lunge position x20  -Step ups with knee drive and 10# KB x15 B    SITTING    MANUAL THERAPY TECHNIQUES  were applied to the lumbar spine  for 0 minutes.  -cephalocaudad oscillations centrally and unilaterally - R  "  -SL for cephalocaudad oscillations - R  -SL left for oscillations and MOUNIKA of the trunk rotators   -SL for HVLATM right side and left side    NEUROMUSCULAR RE-EDUCATION activities to improve: Balance, motor control and stability for 40 minutes. The following activities were included:  -core activation hold 10" x 12    CORE TRAINING  Level 1 hook lying   limb touch  -Marching heel tap x15  -Kick outs  x15  -SLR 2# AW   x15 past opp knee  -dead bugs alt arm/leg x20  -LTR w/4#MB overhead x20  -BLE kick outs w/4#MB overhead x15  +no limb touch  -Marching x15  -Kick outs x15  -SLR x15    Level 2     90/90  +limb touch  -Marching w/4# MB overhead x20  -Kick outs with 4# MB overhead x20  -Alternating leg lowering with 4# MB overhead x20   -dead bugs alt arm/leg x20  -SLR x15    +no limb touch  -Marching x15  -Kick outs x15  -SLR x15    SIDE LYING  -Bilateral open book x10  -plank hold 10" x 6    PRONE  -Plank on forearms hold 15" x 8  -plank leg lift x15  -plank arm reach x6  = challenged  -plank opp arm / leg lift x12  -TB leg lifts x15  -TB swimmers arms only alternate x15  -TB trunk raise x15  -TB superman x15  -TB opp arm / leg x15    Sitting  -Trunk rotation w/FMS red cord x15 end range to mid pos / mid pos to end range x15    therapeutic activities to improve functional performance for 0  minutes, including:  -SL squat x10    MODALITY      PATIENT EDUCATION AND HOME EXERCISES     Home Exercises Provided and Patient Education Provided     Education provided:   -Findings of evaluation and examination, and affect of these on plan for treatment  -Prognosis and expectations  -Home exercise program and expectations of therapy to be provided upon correct return demonstration of exercises.    Written Home Exercises Provided: no  ASSESSMENT     The patient performed the activities without limitations. She is progressing with trunk and core strengthening and motor control. Some activities are performed with better control and " improved mobility. Will progress with TB activities in the supine position.       Karla Is progressing towards her goals.   Pt prognosis is Good.     Pt will continue to benefit from skilled outpatient physical therapy to address the deficits listed in the problem list box on initial evaluation, provide pt/family education and to maximize pt's level of independence in the home and community environment.   Pt's spiritual, cultural and educational needs considered and pt agreeable to plan of care and goals.     Anticipated barriers to physical therapy: none     Goals:   Goals to be met:     Short Term GOALS: 5 weeks. Pt agrees with goals set.  1. Pts pain intensity decreased 20-40% for improved quality of life and functional mobilty. ONGOING  2. Pt to demonstrate core activation with spinal stability during transitional movements for improved quality of movement. ONGOING  3.  Pt demonstrates functional active hip extension on the left for restoring functional mobility.ONGOING  4. Patient demonstrates independence with HEP for self management . ONGOING  5. Patient demonstrates independence with Postural Awareness for control of pain.ONGOING  6. Pt demonstrates active lumbar extension rotation to the left w/o pain for improved functional mobility ONGOING     Long Term GOALS: 10 weeks. Pt agrees with goals set.  1. Patient demonstrates increased lumbar extension rotation mobility with  to restore functional mobility and tolerance to functional activities. ONGOING  2. Pt demonstrates standing extension and flexion with improved lumbar mobility to restore functional mobility. ONGOING  3. Patient demonstrates improved overall function and return demonstration to occupational activities and recreational lifestyle. ONGOING  4. Pt demonstrates  [place] with body mechanics to control episodes of pain associated with daily ADL and improve functional lifestyle. ONGOING  5. Pts pain level decreased to 75% or greater for with  rising to stand from a bed or chair for restoring functional mobility and ADL. ONGOING   Plan      Plan of care Certification: 8/14/2023 to 11/14/2023.     Outpatient Physical Therapy 2 times weekly for 10 weeks to include the following interventions: Manual Therapy, Patient Education, Therapeutic Activities, and Therapeutic Exercise.        Magdy Lockett, PT

## 2023-10-27 ENCOUNTER — CLINICAL SUPPORT (OUTPATIENT)
Dept: REHABILITATION | Facility: HOSPITAL | Age: 48
End: 2023-10-27
Payer: COMMERCIAL

## 2023-10-27 DIAGNOSIS — M62.81 WEAKNESS OF TRUNK MUSCULATURE: ICD-10-CM

## 2023-10-27 DIAGNOSIS — R29.898 WEAKNESS OF BACK: Primary | ICD-10-CM

## 2023-10-27 PROCEDURE — 97110 THERAPEUTIC EXERCISES: CPT | Mod: PO,CQ

## 2023-10-27 PROCEDURE — 97112 NEUROMUSCULAR REEDUCATION: CPT | Mod: PO,CQ

## 2023-10-27 NOTE — PROGRESS NOTES
[facility]   Physical Therapy Treatment Note     Name: Karla Washington  Clinic Number: [id number]    Therapy Diagnosis:   Encounter Diagnoses   Name Primary?    Weakness of back Yes    Weakness of trunk musculature      Physician: Alfreda Renteria NP    Visit Date: 10/27/2023    Physician Orders: PT Eval and Treat low back  Medical Diagnosis from Referral:   DDD (degenerative disc disease), lumbar [M51.36], Strain of lumbar region, initial encounter [S39.012A], Osteoarthritis of spine with radiculopathy, lumbar region [M47.26]  Evaluation Date: 8/14/2023  Authorization Period Expiration: 7/10/2024  Plan of Care Expiration: 11/14/2023  Progress Note Due: 9/14/2023  Visit # / Visits authorized: 1/ 1,  13 / 20   FOTO: 2/ 3 - last completed on 10/13/2023  PTA Visit #: 1/5     Precautions: Standard    Time In: 7:10  Time Out: 8:00  Total Billable Time: 50 minutes    SUBJECTIVE     Pt reports: soreness in the glutes and back. Mild pain in the low back.    She was not issued a home exercise program.  Response to previous treatment: felt fine.    Functional change: no limitation with activity  .   Pain:  1-2/10  Location: right low back    OBJECTIVE       TREATMENT        Karla received the treatments listed below:    .BOLD INDICATES ACTIVITIES PERFORMED / DISCUSSED     THERAPEUTIC EXERCISES to develop  strength, ROM, and flexibility for 20 minutes including   Leg press   Recumbent bike  Upright bike   UE ergometer 8' for segment rotation, level 3  Treadmill  Elliptical    SUPINE  -Bridges with GTB x30    SIDELYING  -open book thoracic and lumbar biased  x15 ea w & w/o 4#MB    PRONE  -Quadruped bird/dog x20    STANDING  -Paloff press with rotation with double PTB in lunge position x20  -Step ups with knee drive and 10# KB x15 B    SITTING    MANUAL THERAPY TECHNIQUES  were applied to the lumbar spine  for 0 minutes  -cephalocaudad oscillations centrally and unilaterally - R  -SL for  "cephalocaudad oscillations - R  -SL left for oscillations and MOUNIKA of the trunk rotators  -SL for HVLATM right side and left side    NEUROMUSCULAR RE-EDUCATION activities to improve: Balance, motor control and stability for 30 minutes. The following activities were included:  -core activation hold 10" x 12    CORE TRAINING  Level 1 hook lying   limb touch  -Marching heel tap x15  -Kick outs  x15  -SLR 2# AW   x15 past opp knee  -dead bugs alt arm/leg x20  -LTR w/4#MB overhead x20  -BLE kick outs w/4#MB overhead x15  +no limb touch  -Marching x15  -Kick outs x15  -SLR x15  +Half kneel chops in freemotion 10# x20 B    Level 2     90/90  +limb touch  -Marching w/4# MB overhead x20  -Kick outs with 4# MB overhead x20  -Alternating leg lowering with 4# MB overhead x20  -dead bugs alt arm/leg x20  -SLR x15 (reports it's too easy)    +no limb touch  -Marching x15  -Kick outs x15  -SLR x15    SIDE LYING  -Bilateral open book x10  -plank hold 10" x 6    PRONE  -Plank on forearms hold 15" x 8  -plank leg lift x15  -plank arm reach x6  = challenged  -plank opp arm / leg lift x12  -TB leg lifts x15  -TB swimmers arms only alternate x15  -TB trunk raise x15  -TB superman x15  -TB opp arm / leg x15    Sitting  -Trunk rotation w/FMS red cord x15 end range to mid pos / mid pos to end range x15    therapeutic activities to improve functional performance for 0 minutes, including:  -SL squat x10    MODALITY    PATIENT EDUCATION AND HOME EXERCISES     Home Exercises Provided and Patient Education Provided     Education provided:   -Findings of evaluation and examination, and affect of these on plan for treatment  -Prognosis and expectations  -Home exercise program and expectations of therapy to be provided upon correct return demonstration of exercises.    Written Home Exercises Provided: no  ASSESSMENT     The patient continues to display good tolerance to current interventions and remains appropriately challenged. She tolerated newly " added exercises with emphasis on core rotation strengthening. Will progress with TB activities in the supine position.    Karla Is progressing towards her goals.   Pt prognosis is Good.     Pt will continue to benefit from skilled outpatient physical therapy to address the deficits listed in the problem list box on initial evaluation, provide pt/family education and to maximize pt's level of independence in the home and community environment.   Pt's spiritual, cultural and educational needs considered and pt agreeable to plan of care and goals.     Anticipated barriers to physical therapy: none     Goals:   Goals to be met:     Short Term GOALS: 5 weeks. Pt agrees with goals set.  1. Pts pain intensity decreased 20-40% for improved quality of life and functional mobilty. ONGOING  2. Pt to demonstrate core activation with spinal stability during transitional movements for improved quality of movement. ONGOING  3.  Pt demonstrates functional active hip extension on the left for restoring functional mobility.ONGOING  4. Patient demonstrates independence with HEP for self management . ONGOING  5. Patient demonstrates independence with Postural Awareness for control of pain.ONGOING  6. Pt demonstrates active lumbar extension rotation to the left w/o pain for improved functional mobility ONGOING     Long Term GOALS: 10 weeks. Pt agrees with goals set.  1. Patient demonstrates increased lumbar extension rotation mobility with  to restore functional mobility and tolerance to functional activities. ONGOING  2. Pt demonstrates standing extension and flexion with improved lumbar mobility to restore functional mobility. ONGOING  3. Patient demonstrates improved overall function and return demonstration to occupational activities and recreational lifestyle. ONGOING  4. Pt demonstrates  [place] with body mechanics to control episodes of pain associated with daily ADL and improve functional lifestyle. ONGOING  5. Pts pain level  decreased to 75% or greater for with rising to stand from a bed or chair for restoring functional mobility and ADL. ONGOING   Plan      Plan of care Certification: 8/14/2023 to 11/14/2023.     Outpatient Physical Therapy 2 times weekly for 10 weeks to include the following interventions: Manual Therapy, Patient Education, Therapeutic Activities, and Therapeutic Exercise.      Rosario Billingsley, PTA

## 2023-11-06 ENCOUNTER — OFFICE VISIT (OUTPATIENT)
Dept: SPINE | Facility: CLINIC | Age: 48
End: 2023-11-06
Payer: COMMERCIAL

## 2023-11-06 ENCOUNTER — CLINICAL SUPPORT (OUTPATIENT)
Dept: REHABILITATION | Facility: HOSPITAL | Age: 48
End: 2023-11-06
Payer: COMMERCIAL

## 2023-11-06 VITALS
RESPIRATION RATE: 18 BRPM | BODY MASS INDEX: 26.5 KG/M2 | HEART RATE: 80 BPM | DIASTOLIC BLOOD PRESSURE: 62 MMHG | SYSTOLIC BLOOD PRESSURE: 97 MMHG | WEIGHT: 164.88 LBS | HEIGHT: 66 IN | OXYGEN SATURATION: 100 %

## 2023-11-06 DIAGNOSIS — M51.36 DDD (DEGENERATIVE DISC DISEASE), LUMBAR: ICD-10-CM

## 2023-11-06 DIAGNOSIS — M62.81 WEAKNESS OF TRUNK MUSCULATURE: ICD-10-CM

## 2023-11-06 DIAGNOSIS — M54.50 DORSALGIA OF LUMBAR REGION: Primary | ICD-10-CM

## 2023-11-06 DIAGNOSIS — M47.26 OSTEOARTHRITIS OF SPINE WITH RADICULOPATHY, LUMBAR REGION: ICD-10-CM

## 2023-11-06 DIAGNOSIS — R29.898 WEAKNESS OF BACK: Primary | ICD-10-CM

## 2023-11-06 DIAGNOSIS — S39.012A STRAIN OF LUMBAR REGION, INITIAL ENCOUNTER: ICD-10-CM

## 2023-11-06 PROCEDURE — 99999 PR PBB SHADOW E&M-EST. PATIENT-LVL III: ICD-10-PCS | Mod: PBBFAC,,, | Performed by: NURSE PRACTITIONER

## 2023-11-06 PROCEDURE — 3078F DIAST BP <80 MM HG: CPT | Mod: CPTII,S$GLB,, | Performed by: NURSE PRACTITIONER

## 2023-11-06 PROCEDURE — 1160F RVW MEDS BY RX/DR IN RCRD: CPT | Mod: CPTII,S$GLB,, | Performed by: NURSE PRACTITIONER

## 2023-11-06 PROCEDURE — 99214 OFFICE O/P EST MOD 30 MIN: CPT | Mod: S$GLB,,, | Performed by: NURSE PRACTITIONER

## 2023-11-06 PROCEDURE — 3074F PR MOST RECENT SYSTOLIC BLOOD PRESSURE < 130 MM HG: ICD-10-PCS | Mod: CPTII,S$GLB,, | Performed by: NURSE PRACTITIONER

## 2023-11-06 PROCEDURE — 3008F PR BODY MASS INDEX (BMI) DOCUMENTED: ICD-10-PCS | Mod: CPTII,S$GLB,, | Performed by: NURSE PRACTITIONER

## 2023-11-06 PROCEDURE — 99999 PR PBB SHADOW E&M-EST. PATIENT-LVL III: CPT | Mod: PBBFAC,,, | Performed by: NURSE PRACTITIONER

## 2023-11-06 PROCEDURE — 1159F MED LIST DOCD IN RCRD: CPT | Mod: CPTII,S$GLB,, | Performed by: NURSE PRACTITIONER

## 2023-11-06 PROCEDURE — 1160F PR REVIEW ALL MEDS BY PRESCRIBER/CLIN PHARMACIST DOCUMENTED: ICD-10-PCS | Mod: CPTII,S$GLB,, | Performed by: NURSE PRACTITIONER

## 2023-11-06 PROCEDURE — 3074F SYST BP LT 130 MM HG: CPT | Mod: CPTII,S$GLB,, | Performed by: NURSE PRACTITIONER

## 2023-11-06 PROCEDURE — 1159F PR MEDICATION LIST DOCUMENTED IN MEDICAL RECORD: ICD-10-PCS | Mod: CPTII,S$GLB,, | Performed by: NURSE PRACTITIONER

## 2023-11-06 PROCEDURE — 97110 THERAPEUTIC EXERCISES: CPT | Mod: PO | Performed by: PHYSICAL THERAPIST

## 2023-11-06 PROCEDURE — 3008F BODY MASS INDEX DOCD: CPT | Mod: CPTII,S$GLB,, | Performed by: NURSE PRACTITIONER

## 2023-11-06 PROCEDURE — 97112 NEUROMUSCULAR REEDUCATION: CPT | Mod: PO | Performed by: PHYSICAL THERAPIST

## 2023-11-06 PROCEDURE — 3078F PR MOST RECENT DIASTOLIC BLOOD PRESSURE < 80 MM HG: ICD-10-PCS | Mod: CPTII,S$GLB,, | Performed by: NURSE PRACTITIONER

## 2023-11-06 PROCEDURE — 99214 PR OFFICE/OUTPT VISIT, EST, LEVL IV, 30-39 MIN: ICD-10-PCS | Mod: S$GLB,,, | Performed by: NURSE PRACTITIONER

## 2023-11-06 NOTE — PROGRESS NOTES
[facility]   Physical Therapy Treatment Note     Name: Karla Washington  Clinic Number: [id number]    Therapy Diagnosis:   Encounter Diagnoses   Name Primary?    Weakness of back Yes    Weakness of trunk musculature      Physician: Alfreda Renteria NP    Visit Date: 11/6/2023    Physician Orders: PT Eval and Treat low back  Medical Diagnosis from Referral:   DDD (degenerative disc disease), lumbar [M51.36], Strain of lumbar region, initial encounter [S39.012A], Osteoarthritis of spine with radiculopathy, lumbar region [M47.26]  Evaluation Date: 8/14/2023  Authorization Period Expiration: 7/10/2024  Plan of Care Expiration: 11/14/2023  Progress Note Due: 9/14/2023  Visit # / Visits authorized: 1/ 1,  14 / 20   FOTO: 2/ 3 - last completed on 10/13/2023  PTA Visit #: 1/5     Precautions: Standard    Time In: 1510  Time Out: 1600  Total Billable Time:  minutes    SUBJECTIVE     Pt reports: no pain    She was not issued a home exercise program.  Response to previous treatment: felt fine.    Functional change: Drove to [place] without any problem. .   Pain:  0 /10  Location: right low back    OBJECTIVE       TREATMENT        Karla received the treatments listed below:    .BOLD INDICATES ACTIVITIES PERFORMED / DISCUSSED     THERAPEUTIC EXERCISES to develop  strength, ROM, and flexibility for 10 minutes including   Leg press   Recumbent bike  Upright bike   UE ergometer 8' for segment rotation, level 3  Treadmill  Elliptical    SUPINE  -Bridges with GTB x30    SIDELYING  -open book thoracic and lumbar biased  x15 ea w & w/o 4#MB    PRONE  -Quadruped bird/dog x20    STANDING  -Paloff press with rotation with double PTB in lunge position x20  -Step ups with knee drive and 10# KB x15 B    SITTING    MANUAL THERAPY TECHNIQUES  were applied to the lumbar spine  for 0 minutes  -cephalocaudad oscillations centrally and unilaterally - R  -SL for cephalocaudad oscillations - R  -SL left for  "oscillations and MOUNIKA of the trunk rotators  -SL for HVLATM right side and left side    NEUROMUSCULAR RE-EDUCATION activities to improve: Balance, motor control and stability for 45 minutes. The following activities were included:  -core activation hold 10" x 12    CORE TRAINING  Level 1 hook lying   limb touch  -Marching heel tap x15  -Kick outs  x15  -SLR 2# AW   x15 past opp knee  -dead bugs alt arm/leg x20  -LTR w/4#MB overhead x20  -BLE kick outs w/4#MB overhead x15  +no limb touch  -Marching x15  -Kick outs x15  -SLR x15  +Half kneel chops in freemotion 10# x20 B    Level 2     90/90  +limb touch  -Marching w/4# MB overhead x20  -Kick outs with 4# MB overhead x20  -Alternating leg lowering with 4# MB overhead x20  -dead bugs alt arm/leg x20  -SLR x15 (reports it's too easy)    +no limb touch  -Marching x15  -Kick outs x15  -SLR x15    -TBall  bridging x15  -TBall  marching x15  -TBall opp arm / opp march x15  -TBall side step arms OH   -TBall feet on ball bridge x15   -TBall legs on ball bridge x15   -TBall legs on ball bridge with leg lift x15  -TBall legs on ball bridge leg lift / opp arm raise     SIDE LYING  -Bilateral open book x10  -plank hold 10" x 6    PRONE  -Plank on forearms hold 15" x 8  -plank leg lift x15  -plank arm reach x6  = challenged  -plank opp arm / leg lift x12  -TB leg lifts x15  -TB swimmers arms only alternate x15  -TB trunk raise x15  -TB superman x15  -TB opp arm / leg x15    Sitting  -Trunk rotation w/FMS red cord x15 end range to mid pos / mid pos to end range x15    therapeutic activities to improve functional performance for 0 minutes, including:  -SL squat x10    MODALITY    PATIENT EDUCATION AND HOME EXERCISES     Home Exercises Provided and Patient Education Provided     Education provided:   -Findings of evaluation and examination, and affect of these on plan for treatment  -Prognosis and expectations  -Home exercise program and expectations of therapy to be provided upon " correct return demonstration of exercises.    Written Home Exercises Provided: no  ASSESSMENT     Karla was progressed with core activity in 90/90 without limb touch.  She did not encounter any problems. Also progressed with stability ball exercises that she performed without difficulty. Some challenge with maintaining the spine in a stable position. Progressing well. Pain is not a factor.      Will progress with TB activities in the supine position.    Karla Is progressing towards her goals.   Pt prognosis is Good.     Pt will continue to benefit from skilled outpatient physical therapy to address the deficits listed in the problem list box on initial evaluation, provide pt/family education and to maximize pt's level of independence in the home and community environment.   Pt's spiritual, cultural and educational needs considered and pt agreeable to plan of care and goals.     Anticipated barriers to physical therapy: none     Goals:   Goals to be met:     Short Term GOALS: 5 weeks. Pt agrees with goals set.  1. Pts pain intensity decreased 20-40% for improved quality of life and functional mobilty. ONGOING  2. Pt to demonstrate core activation with spinal stability during transitional movements for improved quality of movement. ONGOING  3.  Pt demonstrates functional active hip extension on the left for restoring functional mobility.ONGOING  4. Patient demonstrates independence with HEP for self management . ONGOING  5. Patient demonstrates independence with Postural Awareness for control of pain.ONGOING  6. Pt demonstrates active lumbar extension rotation to the left w/o pain for improved functional mobility ONGOING     Long Term GOALS: 10 weeks. Pt agrees with goals set.  1. Patient demonstrates increased lumbar extension rotation mobility with  to restore functional mobility and tolerance to functional activities. ONGOING  2. Pt demonstrates standing extension and flexion with improved lumbar mobility to restore  functional mobility. ONGOING  3. Patient demonstrates improved overall function and return demonstration to occupational activities and recreational lifestyle. ONGOING  4. Pt demonstrates  [place] with body mechanics to control episodes of pain associated with daily ADL and improve functional lifestyle. ONGOING  5. Pts pain level decreased to 75% or greater for with rising to stand from a bed or chair for restoring functional mobility and ADL. ONGOING   Plan      Plan of care Certification: 8/14/2023 to 11/14/2023.     Outpatient Physical Therapy 2 times weekly for 10 weeks to include the following interventions: Manual Therapy, Patient Education, Therapeutic Activities, and Therapeutic Exercise.      Magdy Lockett, PT

## 2023-11-06 NOTE — PROGRESS NOTES
Subjective:     Patient ID: Karla Washington is a 48 y.o. female.    Chief Complaint: Follow-up    Interval History 11/6/23  Ms. Washington presents today for follow up of low back pain. She is currently in PT with good improvement in her back pain. No other complaints today.    HPI Ms. Washington is a 48-year-old female here for evaluation of low back and leg pain    Complaints of right glute and right leg sciatica type symptoms that started Sunday after lifting a cardiac  She does note a history of sciatica type pain in the past on her left  She describes the pain as starting in the right glute and radiating down the right leg sharp and shooting to the bottom of her foot and her big toe with a burning sensation in the calf  No PT or injections in the past  Works as a   Prescribed Flexeril by PCP but has not started it yet    Lumbar xray 2023    FINDINGS:  The disc spaces are well preserved.  No fracture, spondylolisthesis or bone destruction identified     Impression:     See above    Past Medical History:   Diagnosis Date    Abnormal Pap smear of cervix     Angio-edema     Fibroid of cervix     History of colposcopy 1997    x2   1997, 2011       Past Surgical History:   Procedure Laterality Date    ESOPHAGOGASTRODUODENOSCOPY N/A 6/9/2023    Procedure: EGD (ESOPHAGOGASTRODUODENOSCOPY);  Surgeon: Jim Frias MD;  Location: [facility];  Service: Endoscopy;  Laterality: N/A;    TONSILLECTOMY         Family History   Problem Relation Age of Onset    Thyroid disease Mother     Melanoma Mother     Alcohol abuse Father     Alcohol abuse Sister     Alcohol abuse Brother     Alcohol abuse Maternal Aunt     Thyroid disease Sister     Thyroid disease Paternal Grandmother     Macular degeneration Paternal Grandmother     Colon cancer Maternal Grandmother     Glaucoma Maternal Grandmother     Breast cancer Paternal Aunt     Bladder Cancer Maternal Grandfather         metastasized to his breast    Colon cancer  Maternal Grandfather     Ovarian cancer Neg Hx     Blindness Neg Hx     Retinal detachment Neg Hx        Social History     Socioeconomic History    Marital status:     Number of children: 0   Tobacco Use    Smoking status: Former     Types: Cigarettes    Smokeless tobacco: Never   Substance and Sexual Activity    Alcohol use: Not Currently    Drug use: No    Sexual activity: Yes     Partners: Male     Birth control/protection: None   Social History Narrative    The patient does exercise regularly (5-6x/week; ).    Rates diet as good.    She is not satisfied with weight.           Current Outpatient Medications   Medication Sig Dispense Refill    ammonium lactate (LAC-HYDRIN) 12 % lotion Apply topically 2 (two) times daily. soles 225 g 3    b complex vitamins tablet Take 1 tablet by mouth once daily.      diclofenac sodium (VOLTAREN) 1 % Gel Apply 2 g topically 4 (four) times daily. 2 g 1    Lactobacillus acidophilus (PROBIOTIC) 10 billion cell Cap       magnesium citrate 100 mg Tab       diclofenac (VOLTAREN) 50 MG EC tablet Take 1 tablet (50 mg total) by mouth 2 (two) times daily. (Patient not taking: Reported on 7/11/2023) 40 tablet 1    EPINEPHrine (EPIPEN 2-MALDONADO) 0.3 mg/0.3 mL AtIn Inject 0.3 mLs (0.3 mg total) into the muscle once. for 1 dose 0.6 mL 5    pantoprazole (PROTONIX) 40 MG tablet Take 1 tablet (40 mg total) by mouth once daily. (Patient not taking: Reported on 7/11/2023) 30 tablet 11    sucralfate (CARAFATE) 1 gram tablet Take 1 tablet (1 g total) by mouth every meal as needed (nausea). (Patient not taking: Reported on 7/11/2023) 120 tablet 2     No current facility-administered medications for this visit.       Review of patient's allergies indicates:   Allergen Reactions    Pcn [penicillins] Hives         Review of Systems   Constitutional: Negative for fever.   Cardiovascular:  Negative for chest pain.   Respiratory:  Negative for shortness of breath.    Musculoskeletal:   Negative for back pain and falls.   Gastrointestinal:  Negative for abdominal pain, bowel incontinence, nausea and vomiting.   Genitourinary:  Negative for bladder incontinence.        Objective:     General: Karla is well-developed, well-nourished, appears stated age, in no acute distress, alert and oriented to time, place and person.     General    Vitals reviewed.  Constitutional: She is oriented to person, place, and time. She appears well-developed and well-nourished.   HENT:   Head: Atraumatic.   Nose: Nose normal.   Eyes: Conjunctivae are normal.   Cardiovascular:  Normal rate.            Pulmonary/Chest: Effort normal.   Neurological: She is alert and oriented to person, place, and time.   Psychiatric: She has a normal mood and affect. Her behavior is normal. Judgment and thought content normal.     General Musculoskeletal Exam   Gait: normal     Back (L-Spine & T-Spine) / Neck (C-Spine) Exam     Back (L-Spine & T-Spine) Range of Motion   Extension:  10   Flexion:  90   Lateral bend right:  10   Lateral bend left:  10     Spinal Sensation   Right Side Sensation  L-Spine Level: normal  S-Spine Level: normal  Left Side Sensation  L-Spine Level: normal  S-Spine Level: normal    Other   She has no scoliosis .  Spinal Kyphosis:  Absent    Comments:  No pain with straight leg raise      Muscle Strength   Right Upper Extremity   Biceps: 5/5   Deltoid:  5/5  Triceps:  5/5  Left Upper Extremity  Biceps: 5/5   Deltoid:  5/5  Triceps:  5/5  Right Lower Extremity   Hip Flexion: 5/5   Quadriceps:  5/5   Ankle Dorsiflexion:  5/5   Anterior tibial:  5/5   EHL:  5/5  Left Lower Extremity   Hip Flexion: 5/5   Quadriceps:  5/5   Ankle Dorsiflexion:  5/5   Anterior tibial:  5/5   EHL:  5/5    Reflexes     Left Side  Biceps:  2+  Brachioradialis:  2+  Achilles:  2+  Ankle Clonus:  absent    Right Side   Biceps:  2+  Brachioradialis:  2+  Achilles:  2+  Ankle Clonus:  absent    Vascular Exam     Right Pulses        Carotid:                   2+    Left Pulses        Carotid:                  2+          Assessment:     1. Dorsalgia of lumbar region    2. DDD (degenerative disc disease), lumbar    3. Strain of lumbar region, initial encounter    4. Osteoarthritis of spine with radiculopathy, lumbar region           Plan:        Prior records reviewed today  Continue PT and HEP  She can not take oral anti-inflammatories due to GI upset  We discussed posture sitting and the importance of trying to sit better.    We discussed the benefits of therapy and exercise and continuing to move.   Return for follow-up as needed    Follow-up: No follow-ups on file. If there are any questions prior to this, the patient was instructed to contact the office.

## 2023-11-15 ENCOUNTER — CLINICAL SUPPORT (OUTPATIENT)
Dept: REHABILITATION | Facility: HOSPITAL | Age: 48
End: 2023-11-15
Payer: COMMERCIAL

## 2023-11-15 DIAGNOSIS — M62.81 WEAKNESS OF TRUNK MUSCULATURE: ICD-10-CM

## 2023-11-15 DIAGNOSIS — R29.898 WEAKNESS OF BACK: Primary | ICD-10-CM

## 2023-11-15 PROCEDURE — 97112 NEUROMUSCULAR REEDUCATION: CPT | Mod: PO,CQ

## 2023-11-15 PROCEDURE — 97110 THERAPEUTIC EXERCISES: CPT | Mod: PO,CQ

## 2023-11-15 NOTE — PROGRESS NOTES
[facility]   Physical Therapy Treatment Note     Name: Karla Washington  Clinic Number: [id number]    Therapy Diagnosis:   Encounter Diagnoses   Name Primary?    Weakness of back Yes    Weakness of trunk musculature      Physician: Alfreda Renteria NP    Visit Date: 11/15/2023    Physician Orders: PT Eval and Treat low back  Medical Diagnosis from Referral:   DDD (degenerative disc disease), lumbar [M51.36], Strain of lumbar region, initial encounter [S39.012A], Osteoarthritis of spine with radiculopathy, lumbar region [M47.26]  Evaluation Date: 8/14/2023  Authorization Period Expiration: 7/10/2024  Plan of Care Expiration: 11/14/2023  Progress Note Due: 9/14/2023  Visit # / Visits authorized: 1/ 1,  15 / 20   FOTO: 3/ 3 - last completed on 10/13/2023  PTA Visit #: 1/5     Precautions: Standard    Time In: 1:00  Time Out: 2:00  Total Billable Time: 60 minutes    SUBJECTIVE     Pt reports: very little pain, maybe 1 or 2.    She was not issued a home exercise program.  Response to previous treatment: felt fine.    Functional change: Drove to [place] without any problem. .   Pain:  0 /10  Location: right low back    OBJECTIVE       TREATMENT        Karla received the treatments listed below:    .BOLD INDICATES ACTIVITIES PERFORMED / DISCUSSED     THERAPEUTIC EXERCISES to develop  strength, ROM, and flexibility for 15 minutes including   Leg press   Recumbent bike  Upright bike   UE ergometer 8' for segment rotation, level 3  Treadmill  Elliptical    SUPINE  -Bridges with GTB x30    SIDELYING  -open book thoracic and lumbar biased  x15 ea w & w/o 4#MB    PRONE  -Quadruped bird/dog x20    STANDING  -Paloff press with rotation with double PTB in lunge position x20  -Step ups with knee drive and 10# KB x15 B    SITTING    MANUAL THERAPY TECHNIQUES  were applied to the lumbar spine  for 0 minutes  -cephalocaudad oscillations centrally and unilaterally - R  -SL for cephalocaudad oscillations -  "R  -SL left for oscillations and MOUNIKA of the trunk rotators  -SL for HVLATM right side and left side    NEUROMUSCULAR RE-EDUCATION activities to improve: Balance, motor control and stability for 45 minutes. The following activities were included:  -core activation hold 10" x 12    CORE TRAINING  Level 1 hook lying   limb touch  -Marching heel tap x15  -Kick outs  x15  -SLR 2# AW   x15 past opp knee  -dead bugs alt arm/leg x20  -LTR w/4#MB overhead x20  -BLE kick outs w/4#MB overhead x15  +no limb touch  -Marching x15  -Kick outs x15  -SLR x15  +Half kneel chops in freemotion 10# x20 B    Level 2     90/90  +limb touch  -Marching w/4# MB overhead x20  -Kick outs with 4# MB overhead x20  -Alternating leg lowering with 4# MB overhead x20  -dead bugs alt arm/leg x20  -SLR x15 (reports it's too easy)    +no limb touch  -Marching x15  -Kick outs x15  -SLR x15    -TBall  bridging x15  -TBall  marching x15  -TBall opp arm / opp march x15  -TBall side step arms OH   -TBall feet on ball bridge x15   -TBall legs on ball bridge x15   -TBall legs on ball bridge with leg lift x15  -TBall legs on ball bridge leg lift / opp arm raise     SIDE LYING  -Bilateral open book x10  -plank hold 10" x 6    PRONE  -Plank on forearms hold 15" x 8  -plank leg lift x15  -plank arm reach x6  = challenged  -plank opp arm / leg lift x12  -TB leg lifts x15  -TB swimmers arms only alternate x15  -TB trunk raise x15  -TB superman x15  -TB opp arm / leg x15    Sitting  -Trunk rotation w/FMS red cord x15 end range to mid pos / mid pos to end range x15    therapeutic activities to improve functional performance for 0 minutes, including:  -SL squat x10    MODALITY    PATIENT EDUCATION AND HOME EXERCISES     Home Exercises Provided and Patient Education Provided     Education provided:   -Findings of evaluation and examination, and affect of these on plan for treatment  -Prognosis and expectations  -Home exercise program and expectations of therapy to be " provided upon correct return demonstration of exercises.    Written Home Exercises Provided: no  ASSESSMENT     Patient continues to experience some challenge with maintaining the spine in a stable position.  Patient reports the planks on her forearms are the most challenging at this time.  Patient completed her therapy as noted above with no increase in symptoms prior to leaving the clinic. Pain is not a factor.      Will progress with TB activities in the supine position.    Karla Is progressing towards her goals.   Pt prognosis is Good.     Pt will continue to benefit from skilled outpatient physical therapy to address the deficits listed in the problem list box on initial evaluation, provide pt/family education and to maximize pt's level of independence in the home and community environment.   Pt's spiritual, cultural and educational needs considered and pt agreeable to plan of care and goals.     Anticipated barriers to physical therapy: none     Goals:   Goals to be met:     Short Term GOALS: 5 weeks. Pt agrees with goals set.  1. Pts pain intensity decreased 20-40% for improved quality of life and functional mobilty. ONGOING  2. Pt to demonstrate core activation with spinal stability during transitional movements for improved quality of movement. ONGOING  3.  Pt demonstrates functional active hip extension on the left for restoring functional mobility.ONGOING  4. Patient demonstrates independence with HEP for self management . ONGOING  5. Patient demonstrates independence with Postural Awareness for control of pain.ONGOING  6. Pt demonstrates active lumbar extension rotation to the left w/o pain for improved functional mobility ONGOING     Long Term GOALS: 10 weeks. Pt agrees with goals set.  1. Patient demonstrates increased lumbar extension rotation mobility with  to restore functional mobility and tolerance to functional activities. ONGOING  2. Pt demonstrates standing extension and flexion with improved  lumbar mobility to restore functional mobility. ONGOING  3. Patient demonstrates improved overall function and return demonstration to occupational activities and recreational lifestyle. ONGOING  4. Pt demonstrates  [place] with body mechanics to control episodes of pain associated with daily ADL and improve functional lifestyle. ONGOING  5. Pts pain level decreased to 75% or greater for with rising to stand from a bed or chair for restoring functional mobility and ADL. ONGOING   Plan      Plan of care Certification: 8/14/2023 to 11/14/2023.     Outpatient Physical Therapy 2 times weekly for 10 weeks to include the following interventions: Manual Therapy, Patient Education, Therapeutic Activities, and Therapeutic Exercise.      Vladimir Raza, PTA

## 2023-11-20 ENCOUNTER — CLINICAL SUPPORT (OUTPATIENT)
Dept: REHABILITATION | Facility: HOSPITAL | Age: 48
End: 2023-11-20
Payer: COMMERCIAL

## 2023-11-20 DIAGNOSIS — R29.898 WEAKNESS OF BACK: Primary | ICD-10-CM

## 2023-11-20 DIAGNOSIS — M62.81 WEAKNESS OF TRUNK MUSCULATURE: ICD-10-CM

## 2023-11-20 PROCEDURE — 97112 NEUROMUSCULAR REEDUCATION: CPT | Mod: PO | Performed by: PHYSICAL THERAPIST

## 2023-11-20 NOTE — PROGRESS NOTES
[facility]   Physical Therapy Treatment Note     Name: Karla Washington  Clinic Number: [id number]    Therapy Diagnosis:   Encounter Diagnoses   Name Primary?    Weakness of back Yes    Weakness of trunk musculature      Physician: Alfreda Renteria NP    Visit Date: 11/20/2023    Physician Orders: PT Eval and Treat low back  Medical Diagnosis from Referral:   DDD (degenerative disc disease), lumbar [M51.36], Strain of lumbar region, initial encounter [S39.012A], Osteoarthritis of spine with radiculopathy, lumbar region [M47.26]  Evaluation Date: 8/14/2023  Authorization Period Expiration: 7/10/2024  Plan of Care Expiration: 11/14/2023  Progress Note Due: 9/14/2023  Visit # / Visits authorized: 1/ 1,  16 / 20   FOTO: 3/ 3 - last completed on 10/13/2023  PTA Visit #: 1/5     Precautions: Standard    Time In: 1424    arrived 1421  Time Out: 1455  Total Billable Time: 31 minutes    SUBJECTIVE     Pt reports: the back is fine today. It was bothering me yesterday.    She was not issued a home exercise program.  Response to previous treatment: did OK  Functional change: same . .   Pain:  2 /10  Location: right low back    OBJECTIVE       TREATMENT        Karla received the treatments listed below:    .BOLD INDICATES ACTIVITIES PERFORMED / DISCUSSED     THERAPEUTIC EXERCISES to develop  strength, ROM, and flexibility for 15 minutes including   Leg press   Recumbent bike  Upright bike   UE ergometer 8' for segment rotation, level 3  Treadmill  Elliptical    SUPINE  -Bridges with GTB x30    SIDELYING  -open book thoracic and lumbar biased  x15 ea w & w/o 4#MB    PRONE  -Quadruped bird/dog x20    STANDING  -Paloff press with rotation with double PTB in lunge position x20  -Step ups with knee drive and 10# KB x15 B    SITTING    MANUAL THERAPY TECHNIQUES  were applied to the lumbar spine  for 0 minutes  -cephalocaudad oscillations centrally and unilaterally - R  -SL for cephalocaudad oscillations  "- R  -SL left for oscillations and MOUNIKA of the trunk rotators  -SL for HVLATM right side and left side    NEUROMUSCULAR RE-EDUCATION activities to improve: Balance, motor control and stability for 30 minutes. The following activities were included:  -core activation hold 10" x 12    CORE TRAINING  Level 1 hook lying   limb touch  -Marching heel tap x15  -Kick outs  x15  -SLR 2# AW   x15 past opp knee  -dead bugs alt arm/leg x20  -LTR w/4#MB overhead x20  -BLE kick outs w/4#MB overhead x15  +no limb touch  -Marching x15  -Kick outs x15  -SLR x15  +Half kneel chops in freemotion 10# x20 B    Level 2     90/90  +limb touch  -Marching w/4# MB overhead x20  -Kick outs with 4# MB overhead x20  -Alternating leg lowering with 4# MB overhead x20  -dead bugs alt arm/leg x20  -SLR x15 (reports it's too easy)    +no limb touch  -Marching x15 4#MB   -Kick outs x15  4#MB   -SLR x15  4#MB       -TBall  marching x15  -TBall opp arm / opp march x15  -TBall side step arms OH   -TBall feet on ball bridge x15   -TBall legs on ball bridge x15   -TBall legs on ball bridge with leg lift x15  -TBall legs on ball bridge leg lift / opp arm raise x15    SIDE LYING  -Bilateral open book x10  -plank hold 10" x 6    PRONE  -Plank on forearms hold 15" x 8  -plank leg lift x15  -plank arm reach x6  = challenged  -plank opp arm / leg lift x12  -TB leg lifts x15  -TB swimmers arms only alternate x15  -TB trunk raise x15  -TB superman x15  -TB opp arm / leg x15    Sitting  -Trunk rotation w/FMS red cord x15 end range to mid pos / mid pos to end range x15    therapeutic activities to improve functional performance for 0 minutes, including:  -SL squat x10    MODALITY    PATIENT EDUCATION AND HOME EXERCISES     Home Exercises Provided and Patient Education Provided     Education provided:   -Findings of evaluation and examination, and affect of these on plan for treatment  -Prognosis and expectations  -Home exercise program and expectations of therapy to " be provided upon correct return demonstration of exercises.    Written Home Exercises Provided: no  ASSESSMENT     The patient performed the routine with good pace and without limitation. She did not require any cueing and is generally independent with the routine. Will complete the approved sessions and DC to HEP.      Will progress with TB activities in the supine position.    Karla Is progressing towards her goals.   Pt prognosis is Good.     Pt will continue to benefit from skilled outpatient physical therapy to address the deficits listed in the problem list box on initial evaluation, provide pt/family education and to maximize pt's level of independence in the home and community environment.   Pt's spiritual, cultural and educational needs considered and pt agreeable to plan of care and goals.     Anticipated barriers to physical therapy: none     Goals:   Goals to be met:     Short Term GOALS: 5 weeks. Pt agrees with goals set.  1. Pts pain intensity decreased 20-40% for improved quality of life and functional mobilty. ONGOING  2. Pt to demonstrate core activation with spinal stability during transitional movements for improved quality of movement. ONGOING  3.  Pt demonstrates functional active hip extension on the left for restoring functional mobility.ONGOING  4. Patient demonstrates independence with HEP for self management . ONGOING  5. Patient demonstrates independence with Postural Awareness for control of pain.ONGOING  6. Pt demonstrates active lumbar extension rotation to the left w/o pain for improved functional mobility ONGOING     Long Term GOALS: 10 weeks. Pt agrees with goals set.  1. Patient demonstrates increased lumbar extension rotation mobility with  to restore functional mobility and tolerance to functional activities. ONGOING  2. Pt demonstrates standing extension and flexion with improved lumbar mobility to restore functional mobility. ONGOING  3. Patient demonstrates improved overall  function and return demonstration to occupational activities and recreational lifestyle. ONGOING  4. Pt demonstrates  [place] with body mechanics to control episodes of pain associated with daily ADL and improve functional lifestyle. ONGOING  5. Pts pain level decreased to 75% or greater for with rising to stand from a bed or chair for restoring functional mobility and ADL. ONGOING   Plan      Plan of care Certification: 8/14/2023 to 11/14/2023.     Outpatient Physical Therapy 2 times weekly for 10 weeks to include the following interventions: Manual Therapy, Patient Education, Therapeutic Activities, and Therapeutic Exercise.      Magdy Lockett, PT

## 2023-11-27 ENCOUNTER — CLINICAL SUPPORT (OUTPATIENT)
Dept: REHABILITATION | Facility: HOSPITAL | Age: 48
End: 2023-11-27
Payer: COMMERCIAL

## 2023-11-27 DIAGNOSIS — M62.81 WEAKNESS OF TRUNK MUSCULATURE: ICD-10-CM

## 2023-11-27 DIAGNOSIS — R29.898 WEAKNESS OF BACK: Primary | ICD-10-CM

## 2023-11-27 PROCEDURE — 97112 NEUROMUSCULAR REEDUCATION: CPT | Mod: PO | Performed by: PHYSICAL THERAPIST

## 2023-11-27 NOTE — PROGRESS NOTES
[facility]   Physical Therapy Treatment Note     Name: Karla Washington  Clinic Number: [id number]    Therapy Diagnosis:   Encounter Diagnoses   Name Primary?    Weakness of back Yes    Weakness of trunk musculature      Physician: Alfreda Renteria NP    Visit Date: 11/27/2023    Physician Orders: PT Eval and Treat low back  Medical Diagnosis from Referral:   DDD (degenerative disc disease), lumbar [M51.36], Strain of lumbar region, initial encounter [S39.012A], Osteoarthritis of spine with radiculopathy, lumbar region [M47.26]  Evaluation Date: 8/14/2023  Authorization Period Expiration: 7/10/2024  Plan of Care Expiration: 11/14/2023  Progress Note Due: 9/14/2023  Visit # / Visits authorized: 1/ 1,  17 / 20   FOTO: 3/ 3 - last completed on 10/13/2023  PTA Visit #: 1/5     Precautions: Standard    Time In: 1210  Time Out: 1300  Total Billable Time: 50 minutes    SUBJECTIVE     Pt reports: the back has not been too bad. Not too bad.   She was not issued a home exercise program.  Response to previous treatment: did OK  Functional change: same . .   Pain:  0 /10  Location: right low back    OBJECTIVE       TREATMENT        Karla received the treatments listed below:    .BOLD INDICATES ACTIVITIES PERFORMED / DISCUSSED     THERAPEUTIC EXERCISES to develop  strength, ROM, and flexibility for 6 minutes including   Leg press   Recumbent bike  Upright bike   UE ergometer 8' for segment rotation, level 3  Treadmill  Elliptical    SUPINE  -Bridges with GTB x30    SIDELYING  -open book thoracic and lumbar biased  x15 ea w & w/o 4#MB    PRONE  -Quadruped bird/dog x20    STANDING  -Paloff press with rotation with double PTB in lunge position x20  -Step ups with knee drive and 10# KB x15 B  -TB sitting with trunk rotation x15 PTB   start pos to mid pos / mid pos to end range x15  SITTING    MANUAL THERAPY TECHNIQUES  were applied to the lumbar spine  for 0 minutes  -cephalocaudad oscillations  "centrally and unilaterally - R  -SL for cephalocaudad oscillations - R  -SL left for oscillations and MOUNIKA of the trunk rotators  -SL for HVLATM right side and left side    NEUROMUSCULAR RE-EDUCATION activities to improve: Balance, motor control and stability for 45 minutes. The following activities were included:  -core activation hold 10" x 12    CORE TRAINING  Level 1 hook lying   limb touch  -Marching heel tap x15  -Kick outs  x15  -SLR 2# AW   x15 past opp knee  -dead bugs alt arm/leg x20  -LTR w/4#MB overhead x20  -BLE kick outs w/4#MB overhead x15  +no limb touch  -Marching x15  -Kick outs x15  -SLR x15  +Half kneel chops in freemotion 10# x20 B    Level 2     90/90  +limb touch  -Marching w/4# MB overhead x20  -Kick outs with 4# MB overhead x20  -Alternating leg lowering with 4# MB overhead x20  -dead bugs alt arm/leg x20  -SLR x15 (reports it's too easy)    +no limb touch  -Marching x15 4#MB   -Kick outs x15  4#MB   -SLR x15  4#MB     -TBall  marching x15  -TBall opp arm / opp march x15  -TBall side step arms OH   -TBall feet on ball bridge x15   -TBall legs on ball bridge x15   -TBall legs on ball bridge with leg lift x15  -TBall legs on ball bridge leg lift / opp arm raise x15    SIDE LYING  -Bilateral open book x10  -plank hold 10" x 6    PRONE  -Plank on forearms hold 15" x 8  -plank leg lift x15  -plank arm reach x6  = challenged  -plank opp arm / leg lift x12  -TB leg lifts x15  -TB swimmers arms only alternate x15  -TB trunk raise x15  -TB superman x15  -TB opp arm / leg x15    Sitting  -Trunk rotation w/FMS red cord x15  -TB sitting with trunk rotation x15 PTB   start pos to mid pos / mid pos to end range x15    STANDING  -Paloff press with rotation with double PTB in lunge position x20  -Step ups with knee drive and 10# KB x15 B  therapeutic activities to improve functional performance for 0 minutes, including:  -SL squat x10    MODALITY    PATIENT EDUCATION AND HOME EXERCISES     Home Exercises " Provided and Patient Education Provided     Education provided:   -Findings of evaluation and examination, and affect of these on plan for treatment  -Prognosis and expectations  -Home exercise program and expectations of therapy to be provided upon correct return demonstration of exercises.    Written Home Exercises Provided: no  ASSESSMENT     The routine was performed as noted above. Karla does not exhibit any difficulty with the exercises. She is progressing very well. Will continue to complete visits and then DC    Will progress with TB activities in the supine position.    Karla Is progressing towards her goals.   Pt prognosis is Good.     Pt will continue to benefit from skilled outpatient physical therapy to address the deficits listed in the problem list box on initial evaluation, provide pt/family education and to maximize pt's level of independence in the home and community environment.   Pt's spiritual, cultural and educational needs considered and pt agreeable to plan of care and goals.     Anticipated barriers to physical therapy: none     Goals:   Goals to be met:     Short Term GOALS: 5 weeks. Pt agrees with goals set.  1. Pts pain intensity decreased 20-40% for improved quality of life and functional mobilty. ONGOING  2. Pt to demonstrate core activation with spinal stability during transitional movements for improved quality of movement. ONGOING  3.  Pt demonstrates functional active hip extension on the left for restoring functional mobility.ONGOING  4. Patient demonstrates independence with HEP for self management . ONGOING  5. Patient demonstrates independence with Postural Awareness for control of pain.ONGOING  6. Pt demonstrates active lumbar extension rotation to the left w/o pain for improved functional mobility ONGOING     Long Term GOALS: 10 weeks. Pt agrees with goals set.  1. Patient demonstrates increased lumbar extension rotation mobility with  to restore functional mobility and tolerance  to functional activities. ONGOING  2. Pt demonstrates standing extension and flexion with improved lumbar mobility to restore functional mobility. ONGOING  3. Patient demonstrates improved overall function and return demonstration to occupational activities and recreational lifestyle. ONGOING  4. Pt demonstrates  [place] with body mechanics to control episodes of pain associated with daily ADL and improve functional lifestyle. ONGOING  5. Pts pain level decreased to 75% or greater for with rising to stand from a bed or chair for restoring functional mobility and ADL. ONGOING   Plan      Plan of care Certification: 8/14/2023 to 11/14/2023.     Outpatient Physical Therapy 2 times weekly for 10 weeks to include the following interventions: Manual Therapy, Patient Education, Therapeutic Activities, and Therapeutic Exercise.      Magdy Lockett, PT

## 2023-12-11 ENCOUNTER — CLINICAL SUPPORT (OUTPATIENT)
Dept: REHABILITATION | Facility: HOSPITAL | Age: 48
End: 2023-12-11
Payer: COMMERCIAL

## 2023-12-11 DIAGNOSIS — R29.898 WEAKNESS OF BACK: Primary | ICD-10-CM

## 2023-12-11 DIAGNOSIS — M62.81 WEAKNESS OF TRUNK MUSCULATURE: ICD-10-CM

## 2023-12-11 PROCEDURE — 97110 THERAPEUTIC EXERCISES: CPT | Mod: PO | Performed by: PHYSICAL THERAPIST

## 2023-12-11 NOTE — PROGRESS NOTES
[facility]   Physical Therapy Treatment Note     Name: Karla Washington  Clinic Number: [id number]    Therapy Diagnosis:   Encounter Diagnoses   Name Primary?    Weakness of back Yes    Weakness of trunk musculature      Physician: Alfreda Renteria NP    Visit Date: 12/11/2023    Physician Orders: PT Eval and Treat low back  Medical Diagnosis from Referral:   DDD (degenerative disc disease), lumbar [M51.36], Strain of lumbar region, initial encounter [S39.012A], Osteoarthritis of spine with radiculopathy, lumbar region [M47.26]  Evaluation Date: 8/14/2023  Authorization Period Expiration: 7/10/20807  Plan of Care Expiration: 2/20/2024 new   Progress Note Due: 9/14/2023  Visit # / Visits authorized: 1/ 1,  18 / 20   FOTO: 3/ 3 - last completed on 10/13/2023  PTA Visit #: 1/5     Precautions: Standard    Time In: 1125  Time Out: 1200  Total Billable Time: 35 minutes    SUBJECTIVE     Pt reports: the back is slightly sore and some pain yesterday.  She said that she sat on a plane yesterday for about four hours.   She was not issued a home exercise program.  Response to previous treatment: did OK  Functional change: same . .   Pain:  2 /10  Location: right low back    OBJECTIVE   \      Lumbar/Thoracic AROM: Pain/Dysfunction with Movement:   Flexion           130/80      140/70 DN  spine symmetry, posterior weight shift      FN   Extension         15/5              20/5  DN        DN   Right side bending              20     40         NP        NP   Left side bending                 20     40 NP        NP   Right rotation            75%             75% DN        DN   Left rotation              75%              75% DP        DN       TREATMENT        Karla received the treatments listed below:    .BOLD INDICATES ACTIVITIES PERFORMED / DISCUSSED     THERAPEUTIC EXERCISES to develop  strength, ROM, and flexibility for 6 minutes including   Leg press   Recumbent bike  Upright bike   UE  "ergometer 8' for segment rotation, level 3  Treadmill  Elliptical    SUPINE  -Bridges with GTB x30    SIDELYING  -open book thoracic and lumbar biased  x15 ea w & w/o 4#MB    PRONE  -Quadruped bird/dog x20    STANDING  -Paloff press with rotation with double PTB in lunge position x20  -Step ups with knee drive and 10# KB x15 B  -TB sitting with trunk rotation x15 PTB   start pos to mid pos / mid pos to end range x15  SITTING    MANUAL THERAPY TECHNIQUES  were applied to the lumbar spine  for 0 minutes  -cephalocaudad oscillations centrally and unilaterally - R  -SL for cephalocaudad oscillations - R  -SL left for oscillations and MOUNIKA of the trunk rotators  -SL for HVLATM right side and left side    NEUROMUSCULAR RE-EDUCATION activities to improve: Balance, motor control and stability for 45 minutes. The following activities were included:  -core activation hold 10" x 12    CORE TRAINING  Level 1 hook lying   limb touch  -Marching heel tap x15  -Kick outs  x15  -SLR 2# AW   x15 past opp knee  -dead bugs alt arm/leg x20  -LTR w/4#MB overhead x20  -BLE kick outs w/4#MB overhead x15  +no limb touch  -Marching x15  -Kick outs x15  -SLR x15  +Half kneel chops in freemotion 10# x20 B    Level 2     90/90  +limb touch  -Marching w/4# MB overhead x20  -Kick outs with 4# MB overhead x20  -Alternating leg lowering with 4# MB overhead x20  -dead bugs alt arm/leg x20  -SLR x15 (reports it's too easy)    +no limb touch  -Marching x15 4#MB   -Kick outs x15  4#MB   -SLR x15  4#MB     -TBall  marching x15  -TBall opp arm / opp march x15  -TBall side step arms OH   -TBall feet on ball bridge x15   -TBall legs on ball bridge x15   -TBall legs on ball bridge with leg lift x15  -TBall legs on ball bridge leg lift / opp arm raise x15    SIDE LYING  -Bilateral open book x10  -plank hold 10" x 6    PRONE  -Plank on forearms hold 15" x 8  -plank leg lift x15  -plank arm reach x6  = challenged  -plank opp arm / leg lift x12  -TB leg lifts " x15  -TB swimmers arms only alternate x15  -TB trunk raise x15  -TB superman x15  -TB opp arm / leg x15    Sitting  -Trunk rotation w/FMS red cord x15  -TB sitting with trunk rotation x15 PTB   start pos to mid pos / mid pos to end range x15    STANDING  -Paloff press with rotation with double PTB in lunge position x20  -Step ups with knee drive and 10# KB x15 B  therapeutic activities to improve functional performance for 0 minutes, including:  -SL squat x10    MODALITY    PATIENT EDUCATION AND HOME EXERCISES     Home Exercises Provided and Patient Education Provided     Education provided:   -Findings of evaluation and examination, and affect of these on plan for treatment  -Prognosis and expectations  -Home exercise program and expectations of therapy to be provided upon correct return demonstration of exercises.    Written Home Exercises Provided: no  ASSESSMENT     Completed the routine as noted. Improved trunk mobility in flexion and extension. Rotation is unchanged. She did not demonstrate pain in any plane of movement. Exercises also did not provoke pain and she reported no pain at the end of the session.     Karla Is progressing towards her goals.   Pt prognosis is Good.     Pt will continue to benefit from skilled outpatient physical therapy to address the deficits listed in the problem list box on initial evaluation, provide pt/family education and to maximize pt's level of independence in the home and community environment.   Pt's spiritual, cultural and educational needs considered and pt agreeable to plan of care and goals.     Anticipated barriers to physical therapy: none     Goals:   Goals to be met:     Short Term GOALS: 5 weeks. Pt agrees with goals set.  1. Pts pain intensity decreased 20-40% for improved quality of life and functional mobilty. ONGOING  2. Pt to demonstrate core activation with spinal stability during transitional movements for improved quality of movement. ONGOING  3.  Pt  demonstrates functional active hip extension on the left for restoring functional mobility.ONGOING  4. Patient demonstrates independence with HEP for self management . ONGOING  5. Patient demonstrates independence with Postural Awareness for control of pain.ONGOING  6. Pt demonstrates active lumbar extension rotation to the left w/o pain for improved functional mobility ONGOING     Long Term GOALS: 10 weeks. Pt agrees with goals set.  1. Patient demonstrates increased lumbar extension rotation mobility with  to restore functional mobility and tolerance to functional activities. ONGOING  2. Pt demonstrates standing extension and flexion with improved lumbar mobility to restore functional mobility. ONGOING  3. Patient demonstrates improved overall function and return demonstration to occupational activities and recreational lifestyle. ONGOING  4. Pt demonstrates  [place] with body mechanics to control episodes of pain associated with daily ADL and improve functional lifestyle. ONGOING  5. Pts pain level decreased to 75% or greater for with rising to stand from a bed or chair for restoring functional mobility and ADL. ONGOING   Plan      Plan of care Certification:  11/20/2023 to 2/20/2024      Outpatient Physical Therapy 2 times weekly for 10 weeks to include the following interventions: Manual Therapy, Patient Education, Therapeutic Activities, and Therapeutic Exercise.      Magdy Lockett, PT

## 2023-12-18 ENCOUNTER — CLINICAL SUPPORT (OUTPATIENT)
Dept: REHABILITATION | Facility: HOSPITAL | Age: 48
End: 2023-12-18
Payer: COMMERCIAL

## 2023-12-18 DIAGNOSIS — R29.898 WEAKNESS OF BACK: Primary | ICD-10-CM

## 2023-12-18 DIAGNOSIS — M62.81 WEAKNESS OF TRUNK MUSCULATURE: ICD-10-CM

## 2023-12-18 PROCEDURE — 97112 NEUROMUSCULAR REEDUCATION: CPT | Mod: PO | Performed by: PHYSICAL THERAPIST

## 2023-12-18 NOTE — PROGRESS NOTES
[facility]   Physical Therapy Treatment Note     Name: Karla Washington  Clinic Number: [id number]    Therapy Diagnosis:   Encounter Diagnoses   Name Primary?    Weakness of back Yes    Weakness of trunk musculature      Physician: Alfreda Renteria NP    Visit Date: 12/18/2023    Physician Orders: PT Eval and Treat low back  Medical Diagnosis from Referral:   DDD (degenerative disc disease), lumbar [M51.36], Strain of lumbar region, initial encounter [S39.012A], Osteoarthritis of spine with radiculopathy, lumbar region [M47.26]  Evaluation Date: 8/14/2023  Authorization Period Expiration: 7/10/43272  Plan of Care Expiration: 2/20/2024 new   Progress Note Due: 9/14/2023  Visit # / Visits authorized: 1/ 1,  19 / 20   FOTO: / 3 - last completed on 10/13/2023  PTA Visit #: 1/5     Precautions: Standard    Time In: 1215  Time Out: 1300  Total Billable Time:  45 minutes    SUBJECTIVE     Pt reports: today Bob OK. Saturday night it hurt a lot for no particular reason.    She was not issued a home exercise program.  Response to previous treatment: did fine   Functional change: same . .   Pain:  2 /10  Location: left low back and sacrum into the gluteal area.     OBJECTIVE   \    12/11/2023  Lumbar/Thoracic AROM: Pain/Dysfunction with Movement:   Flexion           130/80      140/70 DN  spine symmetry, posterior weight shift      FN   Extension         15/5              20/5  DN        DN   Right side bending              20     40         NP        NP   Left side bending                 20     40 NP        NP   Right rotation            75%             75% DN        DN   Left rotation              75%              75% DP        DN       TREATMENT        Karla received the treatments listed below:    .BOLD INDICATES ACTIVITIES PERFORMED / DISCUSSED     THERAPEUTIC EXERCISES to develop  strength, ROM, and flexibility for  minutes including   Leg press   Recumbent bike  Upright bike   UE  "ergometer 8' for segment rotation, level 3  Treadmill  Elliptical    SUPINE  -Bridges with GTB x30    SIDELYING  -open book thoracic and lumbar biased  x15 ea w & w/o 4#MB    PRONE  -Quadruped bird/dog x20    STANDING  -Paloff press with rotation with double PTB in lunge position x20  -Step ups with knee drive and 10# KB x15 B  -TB sitting with trunk rotation x15 PTB   start pos to mid pos / mid pos to end range x15  SITTING    MANUAL THERAPY TECHNIQUES  were applied to the lumbar spine  for 0 minutes  -cephalocaudad oscillations centrally and unilaterally - R  -SL for cephalocaudad oscillations - R  -SL left for oscillations and MOUNIKA of the trunk rotators  -SL for HVLATM right side and left side    NEUROMUSCULAR RE-EDUCATION activities to improve: Balance, motor control and stability for 45 minutes. The following activities were included:  -core activation hold 10" x 12    CORE TRAINING  Level 1 hook lying   limb touch  -Marching heel tap x15  -Kick outs  x15  -SLR 2# AW   x15 past opp knee  -dead bugs alt arm/leg x20  -LTR w/4#MB overhead x20  -BLE kick outs w/4#MB overhead x15  +no limb touch  -Marching x15  -Kick outs x15  -SLR x15  +Half kneel chops in freemotion 10# x20 B    Level 2     90/90  +limb touch  -Marching w/4# MB overhead x20  -Kick outs with 4# MB overhead x20  -Alternating leg lowering with 4# MB overhead x20  -dead bugs alt arm/leg x20  -SLR x15 (reports it's too easy)    +no limb touch  -Marching x15  4#MB   -Kick outs x15  4#MB   -SLR x15  4#MB   -double leg kick outs w/OH arms 4# MB     -TBall  marching x15  -TBall opp arm / opp march x15  -TBall side step arms OH   -TBall feet on ball bridge x15   -TBall legs on ball bridge x15   -TBall legs on ball bridge with leg lift x15  -TBall legs on ball bridge leg lift / opp arm raise x15    SIDE LYING  -Bilateral open book x10  -plank hold 10" x 6    PRONE  -Plank on forearms hold 15" x 8  -plank leg lift x15  -plank arm reach x6  = challenged  -plank " opp arm / leg lift x12  -TB leg lifts x15  -TB swimmers arms only alternate x15  -TB trunk raise x15  -TB superman x15  -TB opp arm / leg x15    Sitting  -Trunk rotation w/FMS red cord x15  -TB sitting with trunk rotation x15 PTB   start pos to mid pos / mid pos to end range x15    STANDING  -Paloff press with rotation with double PTB in lunge position x20  -Step ups with knee drive and 10# KB x15 B  therapeutic activities to improve functional performance for 0 minutes, including:  -SL squat x10    MODALITY    PATIENT EDUCATION AND HOME EXERCISES     Home Exercises Provided and Patient Education Provided     Education provided:   -Findings of evaluation and examination, and affect of these on plan for treatment  -Prognosis and expectations  -Home exercise program and expectations of therapy to be provided upon correct return demonstration of exercises.    Written Home Exercises Provided: no  ASSESSMENT     The patient performed the routine without cueing and demonstrates good return demonstration of the exercise routine. Pain was eliminated at the end of the session.     Discharge reason : Met goals, Patient has maximized benefit from PT at this time, and all approved sessions were completed.  Goals Achieved: yes     Karla Is progressing towards her goals.   Pt prognosis is Good.     Pt will continue to benefit from skilled outpatient physical therapy to address the deficits listed in the problem list box on initial evaluation, provide pt/family education and to maximize pt's level of independence in the home and community environment.   Pt's spiritual, cultural and educational needs considered and pt agreeable to plan of care and goals.     Anticipated barriers to physical therapy: none     Goals:   Goals to be met:     Short Term GOALS: 5 weeks. Pt agrees with goals set.  1. Pts pain intensity decreased 20-40% for improved quality of life and functional mobilty. MET  2. Pt to demonstrate core activation with spinal  stability during transitional movements for improved quality of movement. MET  3.  Pt demonstrates functional active hip extension on the left for restoring functional mobility. MET  4. Patient demonstrates independence with HEP for self management . MET  5. Patient demonstrates independence with Postural Awareness for control of pain.MET  6. Pt demonstrates active lumbar extension rotation to the left w/o pain for improved functional mobility MET     Long Term GOALS: 10 weeks. Pt agrees with goals set.  1. Patient demonstrates increased lumbar extension rotation mobility with  to restore functional mobility and tolerance to functional activities. MET  2. Pt demonstrates standing extension and flexion with improved lumbar mobility to restore functional mobility. MET  3. Patient demonstrates improved overall function and return demonstration to occupational activities and recreational lifestyle.MET   4. Pt demonstrates  [place] with body mechanics to control episodes of pain associated with daily ADL and improve functional lifestyle. MET  5. Pts pain level decreased to 75% or greater for with rising to stand from a bed or chair for restoring functional mobility and ADL. MET    Plan      Plan of care Certification:  11/20/2023 to 2/20/2024     Discharge reason : Met goals,    Discharge plan :Continue HEP,       Outpatient Physical Therapy 2 times weekly for 10 weeks to include the following interventions: Manual Therapy, Patient Education, Therapeutic Activities, and Therapeutic Exercise.      Magdy Lockett, PT

## 2024-02-05 ENCOUNTER — HOSPITAL ENCOUNTER (OUTPATIENT)
Dept: RADIOLOGY | Facility: OTHER | Age: 49
Discharge: HOME | End: 2024-02-05
Attending: STUDENT IN AN ORGANIZED HEALTH CARE EDUCATION/TRAINING PROGRAM
Payer: COMMERCIAL

## 2024-02-05 ENCOUNTER — OFFICE VISIT (OUTPATIENT)
Dept: INTERNAL MEDICINE | Facility: CLINIC | Age: 49
End: 2024-02-05
Payer: COMMERCIAL

## 2024-02-05 VITALS
DIASTOLIC BLOOD PRESSURE: 78 MMHG | HEART RATE: 103 BPM | TEMPERATURE: 98 F | HEIGHT: 66 IN | BODY MASS INDEX: 25.19 KG/M2 | WEIGHT: 156.75 LBS | OXYGEN SATURATION: 98 % | SYSTOLIC BLOOD PRESSURE: 98 MMHG

## 2024-02-05 DIAGNOSIS — E04.1 THYROID NODULE: ICD-10-CM

## 2024-02-05 DIAGNOSIS — E04.1 THYROID NODULE: Primary | ICD-10-CM

## 2024-02-05 PROCEDURE — 76536 US EXAM OF HEAD AND NECK: CPT | Mod: 26,,, | Performed by: RADIOLOGY

## 2024-02-05 PROCEDURE — 1159F MED LIST DOCD IN RCRD: CPT | Mod: CPTII,S$GLB,, | Performed by: STUDENT IN AN ORGANIZED HEALTH CARE EDUCATION/TRAINING PROGRAM

## 2024-02-05 PROCEDURE — 3074F SYST BP LT 130 MM HG: CPT | Mod: CPTII,S$GLB,, | Performed by: STUDENT IN AN ORGANIZED HEALTH CARE EDUCATION/TRAINING PROGRAM

## 2024-02-05 PROCEDURE — 76536 US EXAM OF HEAD AND NECK: CPT | Mod: TC

## 2024-02-05 PROCEDURE — 3078F DIAST BP <80 MM HG: CPT | Mod: CPTII,S$GLB,, | Performed by: STUDENT IN AN ORGANIZED HEALTH CARE EDUCATION/TRAINING PROGRAM

## 2024-02-05 PROCEDURE — 99213 OFFICE O/P EST LOW 20 MIN: CPT | Mod: S$GLB,,, | Performed by: STUDENT IN AN ORGANIZED HEALTH CARE EDUCATION/TRAINING PROGRAM

## 2024-02-05 PROCEDURE — 99999 PR PBB SHADOW E&M-EST. PATIENT-LVL IV: CPT | Mod: PBBFAC,,, | Performed by: STUDENT IN AN ORGANIZED HEALTH CARE EDUCATION/TRAINING PROGRAM

## 2024-02-05 PROCEDURE — 3008F BODY MASS INDEX DOCD: CPT | Mod: CPTII,S$GLB,, | Performed by: STUDENT IN AN ORGANIZED HEALTH CARE EDUCATION/TRAINING PROGRAM

## 2024-02-05 NOTE — PROGRESS NOTES
"[facility]    Subjective:     Patient ID: Karla Washington is a 49 y.o. female.  Chief Complaint: Adenopathy (right side of neck. Vomited outside before visit. )    HPI:  Patient is a 49 y.o. female who   has a past medical history of Abnormal Pap smear of cervix, Angio-edema, Fibroid of cervix, and History of colposcopy (1997).  who presents for swelling in neck. Noticed it yesterday afternoon.     Incidentally she also had clare nausea and vomiting this morniong.  In addition she states she woke up with vertigo 2 days ago.  The symptoms lasted about 30 minutes.  She states this may have been due to the fact that she woke up to an alarm which she does not usually do.    Current Outpatient Medications   Medication Instructions    ammonium lactate (LAC-HYDRIN) 12 % lotion Topical (Top), 2 times daily, soles    b complex vitamins tablet 1 tablet, Oral, Daily    diclofenac sodium (VOLTAREN) 2 g, Topical (Top), 4 times daily    EPINEPHrine (EPIPEN 2-MALDONADO) 0.3 mg, Intramuscular, Once    Lactobacillus acidophilus (PROBIOTIC) 10 billion cell Cap No dose, route, or frequency recorded.    magnesium citrate 100 mg Tab No dose, route, or frequency recorded.       Objective:        Body mass index is 25.3 kg/m².  Vitals:    02/05/24 0817   BP: 98/78   Pulse: 103   Temp: 97.6 °F (36.4 °C)   SpO2: 98%   Weight: 71.1 kg (156 lb 12 oz)   Height: 5' 6" (1.676 m)   PainSc: 0-No pain     Physical Exam  Constitutional:       Appearance: Normal appearance.   HENT:      Mouth/Throat:      Mouth: Mucous membranes are moist.      Pharynx: Oropharynx is clear.   Neck:        Comments: Right-sided thyroid nodule palpated  Cardiovascular:      Rate and Rhythm: Normal rate.      Heart sounds: No murmur heard.  Pulmonary:      Effort: Pulmonary effort is normal.   Abdominal:      General: Abdomen is flat.      Palpations: Abdomen is soft.   Musculoskeletal:      Right lower leg: No edema.      Left lower leg: No edema. "   Lymphadenopathy:      Head:      Right side of head: No preauricular or posterior auricular adenopathy.      Left side of head: No preauricular or posterior auricular adenopathy.      Cervical:      Right cervical: No superficial or posterior cervical adenopathy.     Left cervical: No superficial or posterior cervical adenopathy.      Upper Body:      Right upper body: No supraclavicular adenopathy.      Left upper body: No supraclavicular adenopathy.   Neurological:      Mental Status: She is alert.           Assessment:         1. Thyroid nodule          Plan:   Check thyroid ultrasound and TSH as well as CBC and CMP considering nausea and vomiting.     Thyroid nodule  -     Cancel: US Soft Tissue Head Neck; Future; Expected date: 02/05/2024  -     TSH; Future; Expected date: 02/05/2024  -     T4, FREE; Future; Expected date: 02/05/2024  -     CBC W/ AUTO DIFFERENTIAL; Future; Expected date: 02/05/2024  -     COMPREHENSIVE METABOLIC PANEL; Future; Expected date: 02/05/2024    Addendum:  Thyroid ultrasound done today.  Results below.  They reveal a cystic nodule that does not meet criteria for FNA.  Called patient to discuss.  Can consider aspiration for treatment if patient desires however she did not feel this was warranted at this time.  Thyroid studies reveal TSH in the low normal range with slightly elevated T4.  These do support the diagnosis of a resolving thyroid adenoma as this is the cause of most cystic thyroid nodules.     Metabolic acidosis also noted on lab work today.  Consistent with patient's acute viral enteritis.  Favor checking this when patient's symptoms resolve.  She stated she has been able to tolerate Pedialyte.  Continue supportive care.      FINDINGS:  Right lobe measures 5.3 x 1.9 x 2.2 cm.  Left lobe measures 4 x 1.4 x 1.3 cm.     Overall, parenchyma is homogeneous.     On the right, predominantly cystic nodule measures 2.6 cm.     On the left, cystic nodule measures 0.2 cm, stable.      Impression:     Multinodular thyroid gland including a dominant predominantly cystic nodule on the right.  No nodules meet criteria for FNA.        Electronically signed by: Leeann Hendricks  Date:                                            02/05/2024  Time:                                           09:24    All of your core healthy metrics are met.    No follow-ups on file. or sooner prn (as needed)        Noman Ochoa  [facility]  [address] [address]  Phone [phone]  Fax [phone]      This note is dictated using the M*Modal Fluency Direct word recognition program. It may contain word recognition mistakes or wrong word substitutions that were missed on review.

## 2024-02-11 ENCOUNTER — PATIENT MESSAGE (OUTPATIENT)
Dept: INTERNAL MEDICINE | Facility: CLINIC | Age: 49
End: 2024-02-11
Payer: COMMERCIAL

## 2024-02-28 ENCOUNTER — LAB VISIT (OUTPATIENT)
Dept: LAB | Facility: OTHER | Age: 49
End: 2024-02-28
Attending: INTERNAL MEDICINE
Payer: COMMERCIAL

## 2024-02-28 ENCOUNTER — OFFICE VISIT (OUTPATIENT)
Dept: INTERNAL MEDICINE | Facility: CLINIC | Age: 49
End: 2024-02-28
Attending: INTERNAL MEDICINE
Payer: COMMERCIAL

## 2024-02-28 VITALS
WEIGHT: 154.56 LBS | HEART RATE: 72 BPM | SYSTOLIC BLOOD PRESSURE: 108 MMHG | BODY MASS INDEX: 24.84 KG/M2 | DIASTOLIC BLOOD PRESSURE: 72 MMHG | HEIGHT: 66 IN | OXYGEN SATURATION: 99 %

## 2024-02-28 DIAGNOSIS — R49.9 CHANGE IN VOICE: ICD-10-CM

## 2024-02-28 DIAGNOSIS — E04.1 THYROID NODULE: ICD-10-CM

## 2024-02-28 DIAGNOSIS — Z83.49 FAMILY HISTORY OF THYROID DISEASE: ICD-10-CM

## 2024-02-28 DIAGNOSIS — R14.0 BLOATING: ICD-10-CM

## 2024-02-28 DIAGNOSIS — Z00.00 ANNUAL PHYSICAL EXAM: Primary | ICD-10-CM

## 2024-02-28 DIAGNOSIS — Z00.00 ANNUAL PHYSICAL EXAM: ICD-10-CM

## 2024-02-28 LAB
ALBUMIN SERPL BCP-MCNC: 4.3 G/DL (ref 3.5–5.2)
ALP SERPL-CCNC: 42 U/L (ref 55–135)
ALT SERPL W/O P-5'-P-CCNC: 10 U/L (ref 10–44)
ANION GAP SERPL CALC-SCNC: 8 MMOL/L (ref 8–16)
AST SERPL-CCNC: 19 U/L (ref 10–40)
BASOPHILS # BLD AUTO: 0.02 K/UL (ref 0–0.2)
BASOPHILS NFR BLD: 0.6 % (ref 0–1.9)
BILIRUB SERPL-MCNC: 1.7 MG/DL (ref 0.1–1)
BUN SERPL-MCNC: 6 MG/DL (ref 6–20)
CALCIUM SERPL-MCNC: 9.6 MG/DL (ref 8.7–10.5)
CHLORIDE SERPL-SCNC: 107 MMOL/L (ref 95–110)
CHOLEST SERPL-MCNC: 230 MG/DL (ref 120–199)
CHOLEST/HDLC SERPL: 4.9 {RATIO} (ref 2–5)
CO2 SERPL-SCNC: 27 MMOL/L (ref 23–29)
CREAT SERPL-MCNC: 1 MG/DL (ref 0.5–1.4)
DIFFERENTIAL METHOD BLD: ABNORMAL
EOSINOPHIL # BLD AUTO: 0 K/UL (ref 0–0.5)
EOSINOPHIL NFR BLD: 1.1 % (ref 0–8)
ERYTHROCYTE [DISTWIDTH] IN BLOOD BY AUTOMATED COUNT: 12.2 % (ref 11.5–14.5)
EST. GFR  (NO RACE VARIABLE): >60 ML/MIN/1.73 M^2
ESTIMATED AVG GLUCOSE: 103 MG/DL (ref 68–131)
GLUCOSE SERPL-MCNC: 91 MG/DL (ref 70–110)
HBA1C MFR BLD: 5.2 % (ref 4–5.6)
HCT VFR BLD AUTO: 44.1 % (ref 37–48.5)
HDLC SERPL-MCNC: 47 MG/DL (ref 40–75)
HDLC SERPL: 20.4 % (ref 20–50)
HGB BLD-MCNC: 14.3 G/DL (ref 12–16)
IGA SERPL-MCNC: 142 MG/DL (ref 40–350)
IMM GRANULOCYTES # BLD AUTO: 0 K/UL (ref 0–0.04)
IMM GRANULOCYTES NFR BLD AUTO: 0 % (ref 0–0.5)
LDLC SERPL CALC-MCNC: 156.8 MG/DL (ref 63–159)
LYMPHOCYTES # BLD AUTO: 1.9 K/UL (ref 1–4.8)
LYMPHOCYTES NFR BLD: 52.7 % (ref 18–48)
MCH RBC QN AUTO: 28.2 PG (ref 27–31)
MCHC RBC AUTO-ENTMCNC: 32.4 G/DL (ref 32–36)
MCV RBC AUTO: 87 FL (ref 82–98)
MONOCYTES # BLD AUTO: 0.2 K/UL (ref 0.3–1)
MONOCYTES NFR BLD: 6.8 % (ref 4–15)
NEUTROPHILS # BLD AUTO: 1.4 K/UL (ref 1.8–7.7)
NEUTROPHILS NFR BLD: 38.8 % (ref 38–73)
NONHDLC SERPL-MCNC: 183 MG/DL
NRBC BLD-RTO: 0 /100 WBC
PLATELET # BLD AUTO: 175 K/UL (ref 150–450)
PMV BLD AUTO: 11.5 FL (ref 9.2–12.9)
POTASSIUM SERPL-SCNC: 4.2 MMOL/L (ref 3.5–5.1)
PROT SERPL-MCNC: 7.1 G/DL (ref 6–8.4)
RBC # BLD AUTO: 5.07 M/UL (ref 4–5.4)
SODIUM SERPL-SCNC: 142 MMOL/L (ref 136–145)
TRIGL SERPL-MCNC: 131 MG/DL (ref 30–150)
TSH SERPL DL<=0.005 MIU/L-ACNC: 1.5 UIU/ML (ref 0.4–4)
WBC # BLD AUTO: 3.53 K/UL (ref 3.9–12.7)

## 2024-02-28 PROCEDURE — 82784 ASSAY IGA/IGD/IGG/IGM EACH: CPT | Performed by: INTERNAL MEDICINE

## 2024-02-28 PROCEDURE — 1160F RVW MEDS BY RX/DR IN RCRD: CPT | Mod: CPTII,S$GLB,, | Performed by: INTERNAL MEDICINE

## 2024-02-28 PROCEDURE — 3074F SYST BP LT 130 MM HG: CPT | Mod: CPTII,S$GLB,, | Performed by: INTERNAL MEDICINE

## 2024-02-28 PROCEDURE — 3008F BODY MASS INDEX DOCD: CPT | Mod: CPTII,S$GLB,, | Performed by: INTERNAL MEDICINE

## 2024-02-28 PROCEDURE — 83036 HEMOGLOBIN GLYCOSYLATED A1C: CPT | Performed by: INTERNAL MEDICINE

## 2024-02-28 PROCEDURE — 99396 PREV VISIT EST AGE 40-64: CPT | Mod: S$GLB,,, | Performed by: INTERNAL MEDICINE

## 2024-02-28 PROCEDURE — 36415 COLL VENOUS BLD VENIPUNCTURE: CPT | Performed by: INTERNAL MEDICINE

## 2024-02-28 PROCEDURE — 1159F MED LIST DOCD IN RCRD: CPT | Mod: CPTII,S$GLB,, | Performed by: INTERNAL MEDICINE

## 2024-02-28 PROCEDURE — 85025 COMPLETE CBC W/AUTO DIFF WBC: CPT | Performed by: INTERNAL MEDICINE

## 2024-02-28 PROCEDURE — 3078F DIAST BP <80 MM HG: CPT | Mod: CPTII,S$GLB,, | Performed by: INTERNAL MEDICINE

## 2024-02-28 PROCEDURE — 86364 TISS TRNSGLTMNASE EA IG CLAS: CPT | Performed by: INTERNAL MEDICINE

## 2024-02-28 PROCEDURE — 80061 LIPID PANEL: CPT | Performed by: INTERNAL MEDICINE

## 2024-02-28 PROCEDURE — 84443 ASSAY THYROID STIM HORMONE: CPT | Performed by: INTERNAL MEDICINE

## 2024-02-28 PROCEDURE — 80053 COMPREHEN METABOLIC PANEL: CPT | Performed by: INTERNAL MEDICINE

## 2024-02-28 PROCEDURE — 99999 PR PBB SHADOW E&M-EST. PATIENT-LVL III: CPT | Mod: PBBFAC,,, | Performed by: INTERNAL MEDICINE

## 2024-02-28 NOTE — PROGRESS NOTES
"Subjective:       Patient ID: Karla Washington is a 49 y.o. female.    Chief Complaint: Annual Exam and Thyroid Problem (Has lump near thyroid)    Here for annual exam    Chronic bloating. 7 month nausea has resolved. Her voice has had some changes as of late. 2.6cm right thyroid cystic nodule. Does not meet FNA requirements. No dysphagia.           Review of Systems   Constitutional:  Negative for chills, fatigue, fever and unexpected weight change.   HENT:  Negative for ear pain, hearing loss, postnasal drip, tinnitus, trouble swallowing and voice change.    Respiratory:  Negative for cough, chest tightness, shortness of breath and wheezing.    Cardiovascular:  Negative for chest pain, palpitations and leg swelling.   Gastrointestinal:  Negative for abdominal pain, blood in stool, diarrhea, nausea and vomiting.   Endocrine: Negative for polydipsia, polyphagia and polyuria.   Genitourinary:  Negative for difficulty urinating, dysuria, hematuria and vaginal bleeding.   Skin:  Negative for rash.   Allergic/Immunologic: Negative for food allergies.   Neurological:  Negative for dizziness, numbness and headaches.   Hematological:  Does not bruise/bleed easily.   Psychiatric/Behavioral:  The patient is not nervous/anxious.        Objective:      Vitals:    02/28/24 0806   BP: 108/72   BP Location: Right arm   Patient Position: Sitting   Pulse: 72   SpO2: 99%   Weight: 70.1 kg (154 lb 8.7 oz)   Height: 5' 6" (1.676 m)      Physical Exam  Vitals and nursing note reviewed.   Constitutional:       General: She is not in acute distress.     Appearance: Normal appearance. She is well-developed.   HENT:      Head: Normocephalic and atraumatic.      Mouth/Throat:      Pharynx: No oropharyngeal exudate.   Eyes:      General: No scleral icterus.     Conjunctiva/sclera: Conjunctivae normal.      Pupils: Pupils are equal, round, and reactive to light.   Neck:      Thyroid: No thyromegaly.   Cardiovascular:      Rate and Rhythm: " Normal rate and regular rhythm.      Heart sounds: Normal heart sounds. No murmur heard.  Pulmonary:      Effort: Pulmonary effort is normal.      Breath sounds: Normal breath sounds. No wheezing or rales.   Abdominal:      General: There is no distension.   Musculoskeletal:         General: No tenderness.   Lymphadenopathy:      Cervical: No cervical adenopathy.   Skin:     General: Skin is warm and dry.   Neurological:      Mental Status: She is alert and oriented to person, place, and time.   Psychiatric:         Behavior: Behavior normal.         Assessment:       1. Annual physical exam    2. Bloating    3. Family history of thyroid disease    4. Thyroid nodule    5. Change in voice        Plan:       Karla was seen today for annual exam and thyroid problem.    Diagnoses and all orders for this visit:    Annual physical exam  -     Comprehensive Metabolic Panel; Future  -     Lipid Panel; Future  -     TSH; Future  -     CBC Auto Differential; Future  -     Hemoglobin A1C; Future    Bloating  -     Tissue transglutaminase, IgA; Future  -     IgA; Future    Family history of thyroid disease    Thyroid nodule    Change in voice   Monitor and if persist another 6 weeks despite Tx of any sinus, allergy, or reflux disease then we will have her see ENT to ensure thyroid nodule is not at play         Geovani Pena MD  Internal Medicine-[facility]        Side effects of medication(s) were discussed in detail and patient voiced understanding.  Patient will call back for any issues or complications.

## 2024-03-04 LAB — TTG IGA SER-ACNC: 0.4 U/ML

## 2024-03-25 ENCOUNTER — OFFICE VISIT (OUTPATIENT)
Dept: GASTROENTEROLOGY | Facility: CLINIC | Age: 49
End: 2024-03-25
Payer: COMMERCIAL

## 2024-03-25 ENCOUNTER — TELEPHONE (OUTPATIENT)
Dept: ENDOSCOPY | Facility: HOSPITAL | Age: 49
End: 2024-03-25
Payer: COMMERCIAL

## 2024-03-25 VITALS
HEART RATE: 65 BPM | SYSTOLIC BLOOD PRESSURE: 104 MMHG | DIASTOLIC BLOOD PRESSURE: 66 MMHG | BODY MASS INDEX: 24.7 KG/M2 | WEIGHT: 153.69 LBS | HEIGHT: 66 IN

## 2024-03-25 DIAGNOSIS — R11.0 NAUSEA: ICD-10-CM

## 2024-03-25 DIAGNOSIS — Z12.11 SCREENING FOR COLON CANCER: Primary | ICD-10-CM

## 2024-03-25 DIAGNOSIS — Z12.11 COLON CANCER SCREENING: Primary | ICD-10-CM

## 2024-03-25 PROCEDURE — 1159F MED LIST DOCD IN RCRD: CPT | Mod: CPTII,S$GLB,,

## 2024-03-25 PROCEDURE — 3074F SYST BP LT 130 MM HG: CPT | Mod: CPTII,S$GLB,,

## 2024-03-25 PROCEDURE — 99214 OFFICE O/P EST MOD 30 MIN: CPT | Mod: S$GLB,,,

## 2024-03-25 PROCEDURE — 3044F HG A1C LEVEL LT 7.0%: CPT | Mod: CPTII,S$GLB,,

## 2024-03-25 PROCEDURE — 3008F BODY MASS INDEX DOCD: CPT | Mod: CPTII,S$GLB,,

## 2024-03-25 PROCEDURE — 99999 PR PBB SHADOW E&M-EST. PATIENT-LVL III: CPT | Mod: PBBFAC,,,

## 2024-03-25 PROCEDURE — 3078F DIAST BP <80 MM HG: CPT | Mod: CPTII,S$GLB,,

## 2024-03-25 RX ORDER — ONDANSETRON 4 MG/1
4 TABLET, ORALLY DISINTEGRATING ORAL EVERY 8 HOURS PRN
Qty: 30 TABLET | Refills: 1 | Status: SHIPPED | OUTPATIENT
Start: 2024-03-25

## 2024-03-25 RX ORDER — POLYETHYLENE GLYCOL 3350, SODIUM SULFATE ANHYDROUS, SODIUM BICARBONATE, SODIUM CHLORIDE, POTASSIUM CHLORIDE 236; 22.74; 6.74; 5.86; 2.97 G/4L; G/4L; G/4L; G/4L; G/4L
4 POWDER, FOR SOLUTION ORAL ONCE
Qty: 4000 ML | Refills: 0 | Status: SHIPPED | OUTPATIENT
Start: 2024-03-25 | End: 2024-03-25

## 2024-03-25 NOTE — TELEPHONE ENCOUNTER
Spoke to pt to schedule procedure(s) Colonoscopy       Physician to perform procedure(s) Dr. ROLY Feldman  Date of Procedure (s) 06/25/24  Arrival Time 11:30 AM  Time of Procedure(s) 12:30 PM   Location of Procedure(s) [place] [address] Floor  Type of Rx Prep sent to patient: PEG  Instructions provided to patient via [facility]    Patient was informed on the following information and verbalized understanding. Screening questionnaire reviewed with patient and complete. If procedure requires anesthesia, a responsible adult needs to be present to accompany the patient home, patient cannot drive after receiving anesthesia. Appointment details are tentative, especially check-in time. Patient will receive a prep-op call 7 days prior to confirm check-in time for procedure. If applicable the patient should contact their pharmacy to verify Rx for procedure prep is ready for pick-up. Patient was advised to call the scheduling department at [phone] if pharmacy states no Rx is available. Patient was advised to call the endoscopy scheduling department if any questions or concerns arise.      [facility] Scheduling Department

## 2024-03-25 NOTE — PROGRESS NOTES
Gastroenterology Clinic Consultation Note    Reason for Visit:  The primary encounter diagnosis was Colon cancer screening. A diagnosis of Nausea was also pertinent to this visit.    PCP:   Geovani Gomez   [address] / [facility] [address]      Initial HPI   This is a 49 y.o. female presenting to discuss screening colonoscopy at the recommendation of her PCP. Maternal GM and GF with colon cancer. Denies unintentional weight loss, blood in stool, severe abdominal pain. Completed cologuard 4/2023 that was noted to be negative, but given her family history it was recommended she get a colonoscopy.     Patient reports intermittent nausea that has been present for about a year. States that this has improved, but still occurs at time. Recent EGD noted gastritis from her NSAID usage. Did not tolerate Carafate.       ROS:  ROS     Medical History:  has a past medical history of Abnormal Pap smear of cervix, Angio-edema, Fibroid of cervix, and History of colposcopy (1997).    Surgical History:  has a past surgical history that includes Tonsillectomy and Esophagogastroduodenoscopy (N/A, 6/9/2023).    Family History: family history includes Alcohol abuse in her brother, father, maternal aunt, and sister; Bladder Cancer in her maternal grandfather; Breast cancer in her paternal aunt; Colon cancer in her maternal grandfather and maternal grandmother; Glaucoma in her maternal grandmother; Macular degeneration in her paternal grandmother; Melanoma in her mother; Thyroid disease in her mother, paternal grandmother, and sister..       Review of patient's allergies indicates:   Allergen Reactions    Pcn [penicillins] Hives       Current Outpatient Medications on File Prior to Visit   Medication Sig Dispense Refill    ammonium lactate (LAC-HYDRIN) 12 % lotion Apply topically 2 (two) times daily. soles 225 g 3    b complex vitamins tablet  "Take 1 tablet by mouth once daily.      diclofenac sodium (VOLTAREN) 1 % Gel Apply 2 g topically 4 (four) times daily. 2 g 1    Lactobacillus acidophilus (PROBIOTIC) 10 billion cell Cap       EPINEPHrine (EPIPEN 2-MALDONADO) 0.3 mg/0.3 mL AtIn Inject 0.3 mLs (0.3 mg total) into the muscle once. for 1 dose 0.6 mL 5    magnesium citrate 100 mg Tab        No current facility-administered medications on file prior to visit.         Objective Findings:    Vital Signs:  /66   Pulse 65   Ht 5' 6" (1.676 m)   Wt 69.7 kg (153 lb 10.6 oz)   BMI 24.80 kg/m²   Body mass index is 24.8 kg/m².    Physical Exam:  Physical Exam        Labs:  Lab Results   Component Value Date    WBC 3.53 (L) 02/28/2024    HGB 14.3 02/28/2024    HCT 44.1 02/28/2024     02/28/2024    CRP 1.2 05/21/2018    CHOL 230 (H) 02/28/2024    TRIG 131 02/28/2024    HDL 47 02/28/2024    ALKPHOS 42 (L) 02/28/2024    ALT 10 02/28/2024    AST 19 02/28/2024     02/28/2024    K 4.2 02/28/2024     02/28/2024    CREATININE 1.0 02/28/2024    BUN 6 02/28/2024    CO2 27 02/28/2024    TSH 1.498 02/28/2024    HGBA1C 5.2 02/28/2024       Imaging reviewed: No pertinent imaging reviewed       Endoscopy reviewed: EGD 6/9/2023  Impression:            - Normal esophagus.                          - Gastritis. Biopsied. Possibly nsaid vs. bile                          gastritis.                          - Normal examined duodenum. Biopsied.   Recommendation:        - Discharge patient to home.                          - Patient has a contact number available for                          emergencies. The signs and symptoms of potential                          delayed complications were discussed with the                          patient. Return to normal activities tomorrow.                          Written discharge instructions were provided to                          the patient.                          - Resume previous diet.                          - " Continue present medications.                          - Await pathology results.                          - Consider trial of protonix and carafate. Avoid                          NSAIDs (aleve, ibuprofen, voltaren, etc)   Jim Frias MD   6/9/2023 10:12:32 AM     Assessment:  1. Colon cancer screening    2. Nausea        Plan:  Orders Placed This Encounter    ondansetron (ZOFRAN-ODT) 4 MG TbDL     Referral placed for colonoscopy for screening purposes due to her family history with two 2nd degree relatives with colon cancer.  Zofran as needed for nausea. Does not feel like this is as bothersome as it previously was.       Thank you for allowing me to participate in this patient's care.    Sincerely,     Alfreda Farias NP  Gastroenterology Department  [facility]

## 2024-03-25 NOTE — PROGRESS NOTES
"GENERAL GI PATIENT INTAKE:    COVID symptoms in the last 7 days (runny nose, sore throat, congestion, cough, fever): No  PCP: Geovani Gomez  If not PCP-  number given to establish [phone]: Yes    ALLERGIES REVIEWED:  yes    CHIEF COMPLAINT:    Chief Complaint   Patient presents with    Nausea    Gas    GI Problem       VITAL SIGNS:  /66   Pulse 65   Ht 5' 6" (1.676 m)   Wt 69.7 kg (153 lb 10.6 oz)   BMI 24.80 kg/m²      Change in medical, surgical, family or social history: Yes      REVIEWED MEDICATION LIST RECONCILED INCLUDING ABOVE MEDS:  Yes     "

## 2024-03-25 NOTE — TELEPHONE ENCOUNTER
"----- Message from Alfreda Farias NP sent at 3/25/2024  1:56 PM CDT -----  Regarding: Colonoscopy  Procedure: Colonoscopy    Diagnosis: Screening colonoscopy    Procedure Timin-12 weeks    #If within 4 weeks selected, please patsy as high priority#    #If greater than 12 weeks, please select "5-12 weeks" and delay sending until 3 months prior to requested date#     Provider: Any GI provider    Location: [facility], [facility], and [facility]    Additional Scheduling Information: No scheduling concerns    Prep Specifications:Standard prep    Is the patient taking a GLP-1 Agonist:no    Have you attached a patient to this message: yes      "

## 2024-05-20 ENCOUNTER — HOSPITAL ENCOUNTER (OUTPATIENT)
Dept: RADIOLOGY | Facility: OTHER | Age: 49
Discharge: HOME | End: 2024-05-20
Attending: INTERNAL MEDICINE
Payer: COMMERCIAL

## 2024-05-20 DIAGNOSIS — Z12.31 ENCOUNTER FOR SCREENING MAMMOGRAM FOR BREAST CANCER: ICD-10-CM

## 2024-05-20 PROCEDURE — 77063 BREAST TOMOSYNTHESIS BI: CPT | Mod: 26,,, | Performed by: RADIOLOGY

## 2024-05-20 PROCEDURE — 77063 BREAST TOMOSYNTHESIS BI: CPT | Mod: TC

## 2024-05-20 PROCEDURE — 77067 SCR MAMMO BI INCL CAD: CPT | Mod: 26,,, | Performed by: RADIOLOGY

## 2024-06-18 ENCOUNTER — TELEPHONE (OUTPATIENT)
Dept: ENDOSCOPY | Facility: HOSPITAL | Age: 49
End: 2024-06-18
Payer: COMMERCIAL

## 2024-06-18 NOTE — TELEPHONE ENCOUNTER
Contacted patient for pre-call for 6/25/24 Colonoscopy. Patient requested to reschedule. New appointment made:        Physician to perform procedure(s) Dr. ROLY Feldman  Date of Procedure (s) 9/3/24  Arrival Time 7:00 AM  Time of Procedure(s) 8:00 AM   Location of Procedure(s) [place] 2nd Floor  Type of Rx Prep patient already received from pharmacy: PEG  Instructions provided to patient via [facility]    The following information was discussed with patient, and patient verbalized understanding:  Screening questionnaire reviewed with patient and complete. If procedure requires anesthesia, a responsible adult needs to be present to accompany the patient home. Appointment details are tentative, especially check-in time. Patient will receive a pre-op call 7 days prior to appointment to confirm check-in time for procedure. If applicable the patient should contact their pharmacy to verify Rx for procedure prep is ready for pick-up. Patient was instructed to call the scheduling department at [phone] if pharmacy states no Rx is available. Patient was also advised to call the endoscopy scheduling department if any questions or concerns arise.      [facility] Scheduling Department

## 2024-07-24 ENCOUNTER — OFFICE VISIT (OUTPATIENT)
Dept: PRIMARY CARE CLINIC | Facility: CLINIC | Age: 49
End: 2024-07-24
Payer: COMMERCIAL

## 2024-07-24 VITALS
HEART RATE: 80 BPM | DIASTOLIC BLOOD PRESSURE: 60 MMHG | HEIGHT: 66 IN | TEMPERATURE: 98 F | WEIGHT: 165.13 LBS | OXYGEN SATURATION: 98 % | SYSTOLIC BLOOD PRESSURE: 104 MMHG | BODY MASS INDEX: 26.54 KG/M2

## 2024-07-24 DIAGNOSIS — J02.9 SORE THROAT: Primary | ICD-10-CM

## 2024-07-24 LAB
CTP QC/QA: YES
S PYO RRNA THROAT QL PROBE: NEGATIVE

## 2024-07-24 PROCEDURE — 1160F RVW MEDS BY RX/DR IN RCRD: CPT | Mod: CPTII,S$GLB,, | Performed by: NURSE PRACTITIONER

## 2024-07-24 PROCEDURE — 99214 OFFICE O/P EST MOD 30 MIN: CPT | Mod: S$GLB,,, | Performed by: NURSE PRACTITIONER

## 2024-07-24 PROCEDURE — 3008F BODY MASS INDEX DOCD: CPT | Mod: CPTII,S$GLB,, | Performed by: NURSE PRACTITIONER

## 2024-07-24 PROCEDURE — 3074F SYST BP LT 130 MM HG: CPT | Mod: CPTII,S$GLB,, | Performed by: NURSE PRACTITIONER

## 2024-07-24 PROCEDURE — 99999 PR PBB SHADOW E&M-EST. PATIENT-LVL III: CPT | Mod: PBBFAC,,, | Performed by: NURSE PRACTITIONER

## 2024-07-24 PROCEDURE — 3078F DIAST BP <80 MM HG: CPT | Mod: CPTII,S$GLB,, | Performed by: NURSE PRACTITIONER

## 2024-07-24 PROCEDURE — 87880 STREP A ASSAY W/OPTIC: CPT | Mod: QW,S$GLB,, | Performed by: NURSE PRACTITIONER

## 2024-07-24 PROCEDURE — 3044F HG A1C LEVEL LT 7.0%: CPT | Mod: CPTII,S$GLB,, | Performed by: NURSE PRACTITIONER

## 2024-07-24 PROCEDURE — 1159F MED LIST DOCD IN RCRD: CPT | Mod: CPTII,S$GLB,, | Performed by: NURSE PRACTITIONER

## 2024-07-24 NOTE — PROGRESS NOTES
[facility]    Chief Complaint      Chief Complaint   Patient presents with    Cough    Sore Throat       History of Present Illness      Karla Washington is a 49 y.o. female who presents today for   Chief Complaint   Patient presents with    Cough    Sore Throat         Patient is new to me.  She presents to clinic today symptoms of sore throat.  Patient reports having sore throat, postnasal drip, and cough recently.  She reports symptoms appear to be improving. She wants to be sure she did not have strep throat.  There are no other complaints at this time.         Review of Systems   HENT:  Positive for sore throat.    Respiratory:  Positive for cough.    All 12 systems otherwise negative.       Family History:  family history includes Alcohol abuse in her brother, father, maternal aunt, and sister; Bladder Cancer in her maternal grandfather; Breast cancer in her paternal aunt; Colon cancer in her maternal grandfather and maternal grandmother; Glaucoma in her maternal grandmother; Macular degeneration in her paternal grandmother; Melanoma in her mother; Thyroid disease in her mother, paternal grandmother, and sister.   Family history was reviewed with patient.     Medications:  Outpatient Encounter Medications as of 7/24/2024   Medication Sig Dispense Refill    ammonium lactate (LAC-HYDRIN) 12 % lotion Apply topically 2 (two) times daily. soles 225 g 3    b complex vitamins tablet Take 1 tablet by mouth once daily.      diclofenac sodium (VOLTAREN) 1 % Gel Apply 2 g topically 4 (four) times daily. 2 g 1    EPINEPHrine (EPIPEN 2-MALDONADO) 0.3 mg/0.3 mL AtIn Inject 0.3 mLs (0.3 mg total) into the muscle once. for 1 dose 0.6 mL 5    Lactobacillus acidophilus (PROBIOTIC) 10 billion cell Cap       magnesium citrate 100 mg Tab       [DISCONTINUED] ondansetron (ZOFRAN-ODT) 4 MG TbDL Take 1 tablet (4 mg total) by mouth every 8 (eight) hours as needed (nausea). 30 tablet 1     No facility-administered  "encounter medications on file as of 7/24/2024.       Allergies:  Review of patient's allergies indicates:   Allergen Reactions    Pcn [penicillins] Hives       Health Maintenance:  Health Maintenance   Topic Date Due    Shingles Vaccine (1 of 2) Never done    Mammogram  05/20/2025    Colorectal Cancer Screening  04/17/2026    TETANUS VACCINE  01/23/2028    Lipid Panel  02/28/2029    Hepatitis C Screening  Completed     Health Maintenance Topics with due status: Not Due       Topic Last Completion Date    TETANUS VACCINE 01/23/2018    Influenza Vaccine 10/19/2020    Cervical Cancer Screening 05/20/2021    Colorectal Cancer Screening 04/17/2023    Hemoglobin A1c (Diabetic Prevention Screening) 02/28/2024    Lipid Panel 02/28/2024    Mammogram 05/20/2024       Physical Exam      Vital Signs  Temp: 98.1 °F (36.7 °C)  Temp Source: Oral  Pulse: 80  SpO2: 98 %  BP: 104/60  Pain Score:   4  Pain Loc: Throat  Height and Weight  Height: 5' 6" (167.6 cm)  Weight: 74.9 kg (165 lb 2 oz)  BSA (Calculated - sq m): 1.87 sq meters  BMI (Calculated): 26.7  Weight in (lb) to have BMI = 25: 154.6]    Physical Exam  Vitals reviewed.   Constitutional:       Appearance: Normal appearance. She is normal weight.   HENT:      Head: Normocephalic and atraumatic.      Nose: Nose normal.      Mouth/Throat:      Mouth: Mucous membranes are moist.      Pharynx: Oropharynx is clear.   Eyes:      Extraocular Movements: Extraocular movements intact.      Conjunctiva/sclera: Conjunctivae normal.      Pupils: Pupils are equal, round, and reactive to light.   Cardiovascular:      Rate and Rhythm: Normal rate and regular rhythm.      Pulses: Normal pulses.      Heart sounds: Normal heart sounds.   Pulmonary:      Effort: Pulmonary effort is normal.      Breath sounds: Normal breath sounds.   Musculoskeletal:         General: Normal range of motion.      Cervical back: Normal range of motion and neck supple.   Skin:     General: Skin is warm and dry.     "  Capillary Refill: Capillary refill takes less than 2 seconds.   Neurological:      General: No focal deficit present.      Mental Status: She is alert and oriented to person, place, and time. Mental status is at baseline.   Psychiatric:         Mood and Affect: Mood normal.         Behavior: Behavior normal.         Thought Content: Thought content normal.         Judgment: Judgment normal.            Assessment/Plan     Karla Washington is a 49 y.o.female with:    Sore throat  -     POCT Rapid Strep A        As above, continue current medications and maintain follow up with specialists.  Return to clinic as needed.    Greater than 50% of visit was spent face to face with patient.  All questions were answered to patient's satisfaction.          Benita Miguel, NP-C  [facility]

## 2024-08-26 ENCOUNTER — TELEPHONE (OUTPATIENT)
Dept: ENDOSCOPY | Facility: HOSPITAL | Age: 49
End: 2024-08-26
Payer: COMMERCIAL

## 2024-08-26 ENCOUNTER — PATIENT MESSAGE (OUTPATIENT)
Dept: OPTOMETRY | Facility: CLINIC | Age: 49
End: 2024-08-26
Payer: COMMERCIAL

## 2024-08-26 ENCOUNTER — ANESTHESIA EVENT (OUTPATIENT)
Dept: ENDOSCOPY | Facility: HOSPITAL | Age: 49
End: 2024-08-26
Payer: COMMERCIAL

## 2024-08-26 NOTE — TELEPHONE ENCOUNTER
Spoke to patient for pre-call to confirm scheduled Colonoscopy and patient verbalized understanding of the following:       Date & arrival time of procedure(s) verified 9/3/24, 7:00 AM.  Location of procedure(s) [place] 2nd Floor verified.  NPO status reinforced. Ok to continue clear liquids until 4:00 AM.   Patient denies use of blood thinners, GLP-1 medications, and weight loss medications.  Patient confirmed receipt of prep instructions and Rx prep.  Instructions provided to patient via [facility].  Patient confirmed ride home after procedure if procedure requires anesthesia.   Pre-call screening questionnaire reviewed and completed with patient.   Appointment details are tentative, including check-in time.  If the patient begins taking any blood thinning medications, injectable weight loss/diabetes medications (other than insulin), or Adipex (phentermine) patient was instructed to contact the endoscopy scheduling department as soon as possible.  Patient was advised to call the endoscopy scheduling department if any questions or concerns arise.      Endoscopy Scheduling Department

## 2024-08-26 NOTE — ANESTHESIA PREPROCEDURE EVALUATION
08/26/2024  Karla Washington is a 49 y.o., female.    Procedure: COLONOSCOPY (N/A)         Patient Active Problem List   Diagnosis    Family history of thyroid disease    History of HPV infection    Myalgia    Arthralgia    Angio-edema    Thyroid nodule    Moderate right ankle sprain    Weakness of back    Weakness of trunk musculature       Past Medical History:   Diagnosis Date    Abnormal Pap smear of cervix     Angio-edema     Fibroid of cervix     History of colposcopy 1997    x2   1997, 2011       ECHO: No results found for this or any previous visit.      There is no height or weight on file to calculate BMI.    Tobacco Use: Medium Risk (7/24/2024)    Patient History     Smoking Tobacco Use: Former     Smokeless Tobacco Use: Never     Passive Exposure: Not on file       Social History     Substance and Sexual Activity   Drug Use No        Alcohol Use: Not At Risk (2/28/2024)    AUDIT-C     Frequency of Alcohol Consumption: Never     Average Number of Drinks: Patient does not drink     Frequency of Binge Drinking: Never       Review of patient's allergies indicates:   Allergen Reactions    Pcn [penicillins] Hives         Airway:  No value filed.    Pre-op Assessment    I have reviewed the Patient Summary Reports.     I have reviewed the Nursing Notes. I have reviewed the NPO Status.   I have reviewed the Medications.     Review of Systems  Anesthesia Hx:  No problems with previous Anesthesia             Denies Family Hx of Anesthesia complications.    Denies Personal Hx of Anesthesia complications.                    Hematology/Oncology:  Hematology Normal   Oncology Normal                                   EENT/Dental:  EENT/Dental Normal           Cardiovascular:  Cardiovascular Normal Exercise tolerance: good                                           Pulmonary:  Pulmonary Normal                        Renal/:  Renal/ Normal                 Hepatic/GI:  Hepatic/GI Normal                 Musculoskeletal:  Musculoskeletal Normal                Neurological:  Neurology Normal                                      Endocrine:  Endocrine Normal            Dermatological:  Skin Normal    Psych:  Psychiatric Normal                    Physical Exam  General: Well nourished, Cooperative, Alert and Oriented    Airway:  Mallampati: II   Mouth Opening: Normal  TM Distance: Normal  Tongue: Normal  Neck ROM: Normal ROM    Dental:  Intact    Chest/Lungs:  Clear to auscultation, Normal Respiratory Rate    Heart:  Rate: Normal  Rhythm: Regular Rhythm  Sounds: Normal    Abdomen:  Normal        Anesthesia Plan  Type of Anesthesia, risks & benefits discussed:    Anesthesia Type: Gen Natural Airway  Intra-op Monitoring Plan: Standard ASA Monitors  Post Op Pain Control Plan: multimodal analgesia and IV/PO Opioids PRN  Induction:  IV  Informed Consent: Informed consent signed with the Patient and all parties understand the risks and agree with anesthesia plan.  All questions answered. Patient consented to blood products? No  ASA Score: 2  Day of Surgery Review of History & Physical: H&P Update referred to the surgeon/provider.    Ready For Surgery From Anesthesia Perspective.     .

## 2024-09-03 ENCOUNTER — HOSPITAL ENCOUNTER (OUTPATIENT)
Facility: HOSPITAL | Age: 49
Discharge: HOME | End: 2024-09-03
Attending: INTERNAL MEDICINE | Admitting: INTERNAL MEDICINE
Payer: COMMERCIAL

## 2024-09-03 ENCOUNTER — ANESTHESIA (OUTPATIENT)
Dept: ENDOSCOPY | Facility: HOSPITAL | Age: 49
End: 2024-09-03
Payer: COMMERCIAL

## 2024-09-03 VITALS
OXYGEN SATURATION: 9 % | RESPIRATION RATE: 20 BRPM | SYSTOLIC BLOOD PRESSURE: 94 MMHG | HEART RATE: 51 BPM | DIASTOLIC BLOOD PRESSURE: 61 MMHG

## 2024-09-03 DIAGNOSIS — Z12.11 SCREEN FOR COLON CANCER: ICD-10-CM

## 2024-09-03 DIAGNOSIS — Z12.11 COLON CANCER SCREENING: Primary | ICD-10-CM

## 2024-09-03 PROCEDURE — 37000009 HC ANESTHESIA EA ADD 15 MINS: Performed by: INTERNAL MEDICINE

## 2024-09-03 PROCEDURE — 94761 N-INVAS EAR/PLS OXIMETRY MLT: CPT

## 2024-09-03 PROCEDURE — G0121 COLON CA SCRN NOT HI RSK IND: HCPCS | Mod: ,,, | Performed by: INTERNAL MEDICINE

## 2024-09-03 PROCEDURE — 99900035 HC TECH TIME PER 15 MIN (STAT)

## 2024-09-03 PROCEDURE — G0121 COLON CA SCRN NOT HI RSK IND: HCPCS | Performed by: INTERNAL MEDICINE

## 2024-09-03 PROCEDURE — 37000008 HC ANESTHESIA 1ST 15 MINUTES: Performed by: INTERNAL MEDICINE

## 2024-09-03 PROCEDURE — 25000003 PHARM REV CODE 250: Performed by: NURSE ANESTHETIST, CERTIFIED REGISTERED

## 2024-09-03 RX ORDER — LIDOCAINE HYDROCHLORIDE 20 MG/ML
INJECTION INTRAVENOUS
Status: DISCONTINUED | OUTPATIENT
Start: 2024-09-03 | End: 2024-09-03

## 2024-09-03 RX ORDER — PROPOFOL 10 MG/ML
VIAL (ML) INTRAVENOUS CONTINUOUS PRN
Status: DISCONTINUED | OUTPATIENT
Start: 2024-09-03 | End: 2024-09-03

## 2024-09-03 RX ORDER — SODIUM CHLORIDE 9 MG/ML
INJECTION, SOLUTION INTRAVENOUS CONTINUOUS
Status: DISCONTINUED | OUTPATIENT
Start: 2024-09-03 | End: 2024-09-03 | Stop reason: HOSPADM

## 2024-09-03 RX ADMIN — Medication 165 MCG/KG/MIN: at 08:09

## 2024-09-03 RX ADMIN — Medication 80 MG: at 08:09

## 2024-09-03 RX ADMIN — LIDOCAINE HYDROCHLORIDE 20 MG: 20 INJECTION INTRAVENOUS at 08:09

## 2024-09-03 RX ADMIN — SODIUM CHLORIDE: 0.9 INJECTION, SOLUTION INTRAVENOUS at 07:09

## 2024-09-03 NOTE — ANESTHESIA POSTPROCEDURE EVALUATION
Anesthesia Post Evaluation    Patient: Karla Washington    Procedure(s) Performed: Procedure(s) (LRB):  COLONOSCOPY (N/A)    Final Anesthesia Type: general      Patient location during evaluation: GI PACU  Patient participation: Yes- Able to Participate  Level of consciousness: awake and alert, oriented and awake  Post-procedure vital signs: reviewed and stable  Pain management: adequate  Airway patency: patent    PONV status at discharge: No PONV  Anesthetic complications: no      Cardiovascular status: stable  Respiratory status: unassisted and room air  Hydration status: euvolemic  Follow-up not needed.              Vitals Value Taken Time   BP 94/61 09/03/24 0915   Temp 36.5 09/03/24 1108   Pulse 51 09/03/24 0915   Resp 20 09/03/24 0915   SpO2 9 % 09/03/24 0915         Event Time   Out of Recovery 09:03:00         Pain/Lanny Score: Lanny Score: 10 (9/3/2024  9:03 AM)

## 2024-09-03 NOTE — PROVATION PATIENT INSTRUCTIONS
Discharge Summary/Instructions after an Endoscopic Procedure  Patient Name: Karla Washington  Patient MRN: [id number]  Patient YOB: 1975  Tuesday, September 3, 2024  Keven Feldman MD  Dear patient,  As a result of recent federal legislation (The Federal Cures Act), you may   receive lab or pathology results from your procedure in your [facility]   account before your physician is able to contact you. Your physician or   their representative will relay the results to you with their   recommendations at their soonest availability.  Thank you,  RESTRICTIONS:  During your procedure today, you received medications for sedation.  These   medications may affect your judgment, balance and coordination.  Therefore,   for 24 hours, you have the following restrictions:   - DO NOT drive a car, operate machinery, make legal/financial decisions,   sign important papers or drink alcohol.    ACTIVITY:  Today: no heavy lifting, straining or running due to procedural   sedation/anesthesia.  The following day: return to full activity including work.  DIET:  Eat and drink normally unless instructed otherwise.     TREATMENT FOR COMMON SIDE EFFECTS:  - Mild abdominal pain, nausea, belching, bloating or excessive gas:  rest,   eat lightly and use a heating pad.  - Sore Throat: treat with throat lozenges and/or gargle with warm salt   water.  - Because air was used during the procedure, expelling large amounts of air   from your rectum or belching is normal.  - If a bowel prep was taken, you may not have a bowel movement for 1-3 days.    This is normal.  SYMPTOMS TO WATCH FOR AND REPORT TO YOUR PHYSICIAN:  1. Abdominal pain or bloating, other than gas cramps.  2. Chest pain.  3. Back pain.  4. Signs of infection such as: chills or fever occurring within 24 hours   after the procedure.  5. Rectal bleeding, which would show as bright red, maroon, or black stools.   (A tablespoon of blood from the rectum is not serious, especially  if   hemorrhoids are present.)  6. Vomiting.  7. Weakness or dizziness.  GO DIRECTLY TO THE NEAREST EMERGENCY ROOM IF YOU HAVE ANY OF THE FOLLOWING:      Difficulty breathing              Chills and/or fever over 101 F   Persistent vomiting and/or vomiting blood   Severe abdominal pain   Severe chest pain   Black, tarry stools   Bleeding- more than one tablespoon   Any other symptom or condition that you feel may need urgent attention  Your doctor recommends these additional instructions:  If any biopsies were taken, your doctors clinic will contact you in 1 to 2   weeks with any results.  - Patient has a contact number available for emergencies.  The signs and   symptoms of potential delayed complications were discussed with the   patient.  Return to normal activities tomorrow.  Written discharge   instructions were provided to the patient.   - Discharge patient to home.   - Resume previous diet.   - Continue present medications.   - Repeat colonoscopy in 10 years for screening purposes.   For questions, problems or results please call your physician - Keven Feldman MD at Work:  [phone].  [facility], EMERGENCY ROOM PHONE NUMBER: [phone]  IF A COMPLICATION OR EMERGENCY SITUATION ARISES AND YOU ARE UNABLE TO REACH   YOUR PHYSICIAN - GO DIRECTLY TO THE EMERGENCY ROOM.  Keven Feldman MD  9/3/2024 8:43:08 AM  This report has been verified and signed electronically.  Dear patient,  As a result of recent federal legislation (The Federal Cures Act), you may   receive lab or pathology results from your procedure in your [facility]   account before your physician is able to contact you. Your physician or   their representative will relay the results to you with their   recommendations at their soonest availability.  Thank you,  PROVATION

## 2024-09-03 NOTE — H&P
Short Stay Endoscopy History and Physical    PCP - Geovani Gomez MD    Procedure - Colonoscopy  Sedation: GA  ASA - per anesthesia  Mallampati - per anesthesia  History of Anesthesia problems - no  Family history Anesthesia problems -  no     HPI:  This is a 49 y.o. female here for evaluation of : Screening for CRC    Reflux - no  Dysphagia - no  Abdominal pain - no  Diarrhea - no    ROS:  Constitutional: No fevers, chills, No weight loss  ENT: No allergies  CV: No chest pain  Pulm: No cough, No shortness of breath  Ophtho: No vision changes  GI: see HPI  Medical History:  has a past medical history of Abnormal Pap smear of cervix, Angio-edema, Fibroid of cervix, and History of colposcopy (1997).    Surgical History:  has a past surgical history that includes Tonsillectomy and Esophagogastroduodenoscopy (N/A, 6/9/2023).    Family History: family history includes Alcohol abuse in her brother, father, maternal aunt, and sister; Bladder Cancer in her maternal grandfather; Breast cancer in her paternal aunt; Colon cancer in her maternal grandfather and maternal grandmother; Glaucoma in her maternal grandmother; Macular degeneration in her paternal grandmother; Melanoma in her mother; Thyroid disease in her mother, paternal grandmother, and sister.. Otherwise no colon cancer, inflammatory bowel disease, or GI malignancies.    Social History:  reports that she has quit smoking. Her smoking use included cigarettes. She has never used smokeless tobacco. She reports that she does not currently use alcohol. She reports that she does not use drugs.    Review of patient's allergies indicates:   Allergen Reactions    Pcn [penicillins] Hives       Medications:   Medications Prior to Admission   Medication Sig Dispense Refill Last Dose    ammonium lactate (LAC-HYDRIN) 12 % lotion Apply topically 2 (two) times daily. soles 225 g 3     b complex vitamins tablet Take 1 tablet by mouth once daily.       diclofenac sodium  (VOLTAREN) 1 % Gel Apply 2 g topically 4 (four) times daily. 2 g 1     EPINEPHrine (EPIPEN 2-MALDONADO) 0.3 mg/0.3 mL AtIn Inject 0.3 mLs (0.3 mg total) into the muscle once. for 1 dose 0.6 mL 5     Lactobacillus acidophilus (PROBIOTIC) 10 billion cell Cap        magnesium citrate 100 mg Tab           Objective Findings:    Vital Signs: Per nursing notes.    Physical Exam:  General Appearance: Well appearing in no acute distress  Head:   Normocephalic, without obvious abnormality  Eyes:    No scleral icterus  Airway: Open  Neck: No restriction in mobility  Lungs: CTA bilaterally in anterior and posterior fields, no wheezes, no crackles.  Heart:  Regular rate and rhythm, S1, S2 normal, no murmurs heard  Abdomen: Soft, non tender, non distended      Labs:  Lab Results   Component Value Date    WBC 3.53 (L) 02/28/2024    HGB 14.3 02/28/2024    HCT 44.1 02/28/2024     02/28/2024    CHOL 230 (H) 02/28/2024    TRIG 131 02/28/2024    HDL 47 02/28/2024    ALT 10 02/28/2024    AST 19 02/28/2024     02/28/2024    K 4.2 02/28/2024     02/28/2024    CREATININE 1.0 02/28/2024    BUN 6 02/28/2024    CO2 27 02/28/2024    TSH 1.498 02/28/2024    HGBA1C 5.2 02/28/2024         I have explained the risks and benefits of endoscopy procedures to the patient including but not limited to bleeding, perforation, infection, and death.    Thank you so much for allowing me to participate in the care of Karla Feldman MD

## 2024-09-03 NOTE — TRANSFER OF CARE
Anesthesia Transfer of Care Note    Patient: Karla Washington    Procedure(s) Performed: Procedure(s) (LRB):  COLONOSCOPY (N/A)    Patient location: PACU    Anesthesia Type: general    Transport from OR: Transported from OR on room air with adequate spontaneous ventilation    Post pain: adequate analgesia    Post assessment: no apparent anesthetic complications    Post vital signs: stable    Level of consciousness: sedated    Nausea/Vomiting: no nausea/vomiting    Complications: none    Transfer of care protocol was followed      Last vitals: Visit Vitals  Breastfeeding No

## 2025-03-07 ENCOUNTER — PATIENT MESSAGE (OUTPATIENT)
Dept: OBSTETRICS AND GYNECOLOGY | Facility: CLINIC | Age: 50
End: 2025-03-07
Payer: COMMERCIAL

## 2025-03-07 NOTE — TELEPHONE ENCOUNTER
We can certainly do a virtual visit to discuss hormones and HRT.  However, if she would like referral to the Hormone clinic I can place that order.

## 2025-03-19 ENCOUNTER — OFFICE VISIT (OUTPATIENT)
Dept: OBSTETRICS AND GYNECOLOGY | Facility: CLINIC | Age: 50
End: 2025-03-19
Payer: COMMERCIAL

## 2025-03-19 VITALS
WEIGHT: 160.69 LBS | DIASTOLIC BLOOD PRESSURE: 74 MMHG | BODY MASS INDEX: 25.83 KG/M2 | HEIGHT: 66 IN | SYSTOLIC BLOOD PRESSURE: 120 MMHG

## 2025-03-19 DIAGNOSIS — Z12.4 PAP SMEAR FOR CERVICAL CANCER SCREENING: ICD-10-CM

## 2025-03-19 DIAGNOSIS — Z12.31 VISIT FOR SCREENING MAMMOGRAM: ICD-10-CM

## 2025-03-19 DIAGNOSIS — N95.1 PERIMENOPAUSAL: ICD-10-CM

## 2025-03-19 DIAGNOSIS — Z32.02 NEGATIVE PREGNANCY TEST: ICD-10-CM

## 2025-03-19 DIAGNOSIS — Z01.419 WOMEN'S ANNUAL ROUTINE GYNECOLOGICAL EXAMINATION: Primary | ICD-10-CM

## 2025-03-19 LAB
B-HCG UR QL: NEGATIVE
CTP QC/QA: YES

## 2025-03-19 PROCEDURE — 87624 HPV HI-RISK TYP POOLED RSLT: CPT | Performed by: OBSTETRICS & GYNECOLOGY

## 2025-03-19 PROCEDURE — 99999 PR PBB SHADOW E&M-EST. PATIENT-LVL III: CPT | Mod: PBBFAC,,, | Performed by: OBSTETRICS & GYNECOLOGY

## 2025-03-19 NOTE — PROGRESS NOTES
"Karla Washington is a 50 y.o. female  who presents for annual exam.  She is perimenopausal with periods continuing to space out.  LMP occurred about 5 months ago.  She is now experiencing night sweats as well as joint pain.  Denies recent changes in her medical or surgical history.      Blood pressure 120/74, height 5' 6" (1.676 m), weight 72.9 kg (160 lb 11.5 oz), last menstrual period 10/26/2024.    21 Pap: Negative, HPV: Negative    24 Mammogram: Negative, TC 9.3%    UPT today: Negative      Past Medical History:   Diagnosis Date    Abnormal Pap smear of cervix     Angio-edema     Fibroid of cervix     History of colposcopy 1997    x2   1997,        Past Surgical History:   Procedure Laterality Date    COLONOSCOPY N/A 9/3/2024    Procedure: COLONOSCOPY;  Surgeon: Keven Feldman MD;  Location: [facility] ENDOSCOPY;  Service: Endoscopy;  Laterality: N/A;  24- r/s to later date, peg, instr to portal. DBM  pt aware of cld/prep on Labor Day.  24- pc complete. DBM    ESOPHAGOGASTRODUODENOSCOPY N/A 2023    Procedure: EGD (ESOPHAGOGASTRODUODENOSCOPY);  Surgeon: Jim Frias MD;  Location: [facility];  Service: Endoscopy;  Laterality: N/A;    TONSILLECTOMY         OB History          0    Para   0    Term   0       0    AB   0    Living   0         SAB   0    IAB   0    Ectopic   0    Multiple   0    Live Births               Obstetric Comments   Menarche age 14.   Menses normal and regular with 26-28 day cycles, and 3-5 days of normal flow.  History of abnormal PAP smear: YES (HPV): colposcopy NEG.  History of sexually transmitted disease:  YES: HPV                        ROS:  GENERAL:  Reports night sweats.  SKIN: Denies rash or lesions.   HEAD: Denies head injury or headache.   NODES: Denies enlarged lymph nodes.   CHEST: Denies chest pain or shortness of breath.   CARDIOVASCULAR: Denies palpitations or left sided chest pain.   ABDOMEN: No abdominal pain, nausea, " "vomiting or rectal bleeding.   URINARY: No dysuria or hematuria.  REPRODUCTIVE: See HPI.   BREASTS: Denies pain, lumps, or nipple discharge.   HEMATOLOGIC:  Reports periods are spacing out.  MUSCULOSKELETAL:  Reports joint discomfort.  NEUROLOGIC: Denies syncope or weakness.   PSYCHIATRIC: Denies depression.    PE:   (chaperone present during entire exam)  APPEARANCE: Well nourished, well developed, in no acute distress.  BREASTS: Symmetrical, no skin changes or visible lesions. No palpable masses, nipple discharge or adenopathy bilaterally.  ABDOMEN: Soft. No tenderness or masses.   VULVA: No lesions. Normal female genitalia.  URETHRAL MEATUS: Normal size and location, no lesions, no prolapse.  URETHRA: No masses, tenderness, prolapse or scarring.  VAGINA:  No lesions. No abnormal discharge, no significant cystocele or rectocele.  CERVIX: No lesions and discharge. PAP done.  UTERUS: Normal size, regular shape, mobile, non-tender, bladder base nontender.  ADNEXA: No masses, tenderness or CDS nodularity.  ANUS PERINEUM: Normal.      Diagnosis:  1. Women's annual routine gynecological examination    2. Perimenopausal    3. Pap smear for cervical cancer screening    4. Visit for screening mammogram    5. Negative pregnancy test          PLAN:    Orders Placed This Encounter    HPV High Risk Genotypes, PCR    Mammo Digital Screening Bilat w/ Odilon (XPD)    POCT Urine Pregnancy    Liquid-Based Pap Smear, Screening       Patient was counseled today on perimenopausal issues and expected bleeding patterns.  We also discussed her perimenopausal symptoms and management options: 1) no treatment  2) non-hormonal options- Heriberto Hernandez  3) "natural" hormones  4) HRT: risks / benefits / studies, [facility].  She will consider and let us know her decision.  She was also encouraged to follow-up with her PCP to discuss join discomfort.    Follow-up in 1 year.    This note was created with voice recognition software.  Grammatical, syntax " and spelling errors may be inevitable.

## 2025-03-25 ENCOUNTER — PATIENT MESSAGE (OUTPATIENT)
Dept: OBSTETRICS AND GYNECOLOGY | Facility: CLINIC | Age: 50
End: 2025-03-25
Payer: COMMERCIAL

## 2025-03-25 ENCOUNTER — TELEPHONE (OUTPATIENT)
Dept: INTERNAL MEDICINE | Facility: CLINIC | Age: 50
End: 2025-03-25
Payer: COMMERCIAL

## 2025-03-25 NOTE — TELEPHONE ENCOUNTER
Called pt and she will like to do VV for joint pain and do annual in May. Pt work schedule hinder her from coming any sooner and she will like to address joint pain soon.     Please advise. Friday slot held.

## 2025-03-31 ENCOUNTER — PATIENT MESSAGE (OUTPATIENT)
Dept: OBSTETRICS AND GYNECOLOGY | Facility: CLINIC | Age: 50
End: 2025-03-31
Payer: COMMERCIAL

## 2025-04-03 ENCOUNTER — OFFICE VISIT (OUTPATIENT)
Dept: PRIMARY CARE CLINIC | Facility: CLINIC | Age: 50
End: 2025-04-03
Payer: COMMERCIAL

## 2025-04-03 VITALS
WEIGHT: 163.13 LBS | SYSTOLIC BLOOD PRESSURE: 98 MMHG | RESPIRATION RATE: 19 BRPM | BODY MASS INDEX: 26.22 KG/M2 | HEIGHT: 66 IN | TEMPERATURE: 99 F | DIASTOLIC BLOOD PRESSURE: 68 MMHG | OXYGEN SATURATION: 98 % | HEART RATE: 64 BPM

## 2025-04-03 DIAGNOSIS — M25.40 PAINFUL SWELLING OF JOINT: Primary | ICD-10-CM

## 2025-04-03 DIAGNOSIS — R82.998 LEUKOCYTES IN URINE: ICD-10-CM

## 2025-04-03 LAB
BILIRUB SERPL-MCNC: NEGATIVE MG/DL
BLOOD URINE, POC: NEGATIVE
CLARITY, POC UA: CLEAR
COLOR, POC UA: YELLOW
GLUCOSE UR QL STRIP: NEGATIVE
KETONES UR QL STRIP: NORMAL
LEUKOCYTE ESTERASE URINE, POC: NORMAL
NITRITE, POC UA: NEGATIVE
PH, POC UA: 5
PROTEIN, POC: NORMAL
SPECIFIC GRAVITY, POC UA: 1.03
UROBILINOGEN, POC UA: NORMAL

## 2025-04-03 PROCEDURE — 99999 PR PBB SHADOW E&M-EST. PATIENT-LVL III: CPT | Mod: PBBFAC,,, | Performed by: NURSE PRACTITIONER

## 2025-04-03 PROCEDURE — 87086 URINE CULTURE/COLONY COUNT: CPT | Performed by: NURSE PRACTITIONER

## 2025-04-03 NOTE — PROGRESS NOTES
"[facility] Note      Subjective      Patient ID: Karla Washington is a 50 y.o. female.    Chief Complaint: Joint Pain     History of Present Illness:    Shingles vaccine on March 15, 2025. The day following she experienced myalgias, joint pain, and skin "hurt." These symptoms subsided then a few days later the myalgias returned but worse with joint swelling and redness. Could not remove rings from fingers. Knuckles were red. Difficulty using hands because of the pain. Snapping her right fingers caused her to yell outside. Cannot use scissors now. Pain is always present at a low level but can get to a point that causes her to cry. She has tried anti-inflammatories with mild improvement in symptoms but tries not to take them because of an abnormal EGD. Tylenol is not working. Movement distracts her from the pain. Had chicken pox as a child.     Review of Systems   Constitutional:  Positive for diaphoresis (worse than prior to the shingles vaccine). Negative for chills and fever.   HENT:  Negative for hearing loss and nosebleeds.    Eyes:  Negative for blurred vision (increaed number of floaters) and double vision.   Respiratory:  Negative for cough, shortness of breath and wheezing.    Cardiovascular:  Negative for chest pain, palpitations and leg swelling.   Gastrointestinal:  Negative for abdominal pain, constipation (no bowel incontinence) and heartburn.   Genitourinary:  Negative for dysuria, frequency and urgency (no incontinence).   Musculoskeletal:  Positive for joint pain and myalgias.   Skin:  Positive for rash (4 itchy places on her back). Negative for itching.   Neurological:  Positive for dizziness (intermittent) and tingling. Negative for weakness.   Endo/Heme/Allergies:  Does not bruise/bleed easily.        Medications:        Medication List with Changes/Refills   Current Medications    AMMONIUM LACTATE (LAC-HYDRIN) 12 % LOTION    Apply topically 2 (two) times daily. soles    B COMPLEX " "VITAMINS TABLET    Take 1 tablet by mouth once daily.    DICLOFENAC SODIUM (VOLTAREN) 1 % GEL    Apply 2 g topically 4 (four) times daily.    EPINEPHRINE (EPIPEN 2-MALDONADO) 0.3 MG/0.3 ML ATIN    Inject 0.3 mLs (0.3 mg total) into the muscle once. for 1 dose    LACTOBACILLUS ACIDOPHILUS (PROBIOTIC) 10 BILLION CELL CAP        MAGNESIUM CITRATE 100 MG TAB              Allergies:    Review of patient's allergies indicates:   Allergen Reactions    Pcn [penicillins] Hives       Medical History:    Past Medical History:   Diagnosis Date    Abnormal Pap smear of cervix     Angio-edema     Fibroid of cervix     History of colposcopy 1997    x2   1997, 2011       Surgical History:     has a past surgical history that includes Tonsillectomy; Esophagogastroduodenoscopy (N/A, 6/9/2023); and Colonoscopy (N/A, 9/3/2024).    Social History:    Social History[1]    Family History:    family history includes Alcohol abuse in her brother, father, maternal aunt, and sister; Bladder Cancer in her maternal grandfather; Breast cancer in her paternal aunt; Colon cancer in her maternal grandfather and maternal grandmother; Glaucoma in her maternal grandmother; Macular degeneration in her paternal grandmother; Melanoma in her mother; Thyroid disease in her mother, paternal grandmother, and sister.     Histories reviewed.      Objective     Vital Signs:    Blood pressure 98/68, pulse 64, temperature 98.5 °F (36.9 °C), temperature source Oral, resp. rate 19, height 5' 6" (1.676 m), weight 74 kg (163 lb 2.3 oz), last menstrual period 10/26/2024, SpO2 98%.    Vital Signs  Temp: 98.5 °F (36.9 °C)  Temp Source: Oral  Pulse: 64  Resp: 19  SpO2: 98 %  BP: 98/68  BP Location: Right arm  Patient Position: Sitting  Pain Score:   6  Pain Loc: Generalized (all joints)  Height and Weight  Height: 5' 6" (167.6 cm)  Weight: 74 kg (163 lb 2.3 oz)  BSA (Calculated - sq m): 1.86 sq meters  BMI (Calculated): 26.3  Weight in (lb) to have BMI = 25: 154.6]  "     Physical Exam:    Physical Exam  Vitals reviewed.   HENT:      Head: Normocephalic and atraumatic. No right periorbital erythema or left periorbital erythema.      Jaw: There is normal jaw occlusion.      Right Ear: Tympanic membrane, ear canal and external ear normal.      Left Ear: Tympanic membrane, ear canal and external ear normal.      Nose: Nose normal. No nasal deformity or mucosal edema.      Right Turbinates: Not enlarged or swollen.      Left Turbinates: Not enlarged or swollen.      Mouth/Throat:      Lips: Pink.      Mouth: Mucous membranes are moist.      Tongue: Tongue does not deviate from midline.      Pharynx: Uvula midline. No oropharyngeal exudate or posterior oropharyngeal erythema.   Eyes:      Extraocular Movements: Extraocular movements intact.      Conjunctiva/sclera: Conjunctivae normal.      Pupils: Pupils are equal, round, and reactive to light.   Neck:      Vascular: No carotid bruit.   Cardiovascular:      Rate and Rhythm: Normal rate and regular rhythm.      Pulses:           Radial pulses are 2+ on the right side and 2+ on the left side.      Heart sounds: Normal heart sounds. No murmur heard.  Pulmonary:      Effort: Pulmonary effort is normal. No respiratory distress.      Breath sounds: Normal breath sounds.   Abdominal:      General: Abdomen is flat. Bowel sounds are normal. There is no distension.      Palpations: Abdomen is soft. There is no hepatomegaly, splenomegaly or mass.      Tenderness: There is no abdominal tenderness. There is no right CVA tenderness, left CVA tenderness or guarding.      Hernia: No hernia is present.   Musculoskeletal:      Right shoulder: No swelling. Normal range of motion.      Left shoulder: No swelling or deformity. Normal range of motion.      Right upper arm: No swelling, edema or deformity.      Left upper arm: No swelling, edema or deformity.      Right elbow: No swelling or deformity. Normal range of motion.      Left elbow: No swelling  or deformity. Normal range of motion.      Right forearm: No swelling, edema or deformity.      Left forearm: No swelling, edema or deformity.      Right wrist: No swelling or deformity. Normal range of motion.      Left wrist: No swelling or deformity. Normal range of motion.      Right hand: Swelling (PIPJ index finger) present. Normal range of motion. Normal strength. Normal sensation. Normal capillary refill. Normal pulse.      Left hand: Swelling (PIPJ index finger) present. Normal range of motion. Normal strength. Normal sensation. Normal capillary refill. Normal pulse.      Cervical back: No pain with movement.      Right knee: No swelling or deformity.      Left knee: No swelling or deformity.      Right ankle: No swelling or deformity.      Left ankle: No swelling or deformity.   Lymphadenopathy:      Head:      Right side of head: No submental, submandibular, tonsillar, preauricular, posterior auricular or occipital adenopathy.      Left side of head: No submental, submandibular, tonsillar, preauricular, posterior auricular or occipital adenopathy.      Cervical: No cervical adenopathy.      Right cervical: No superficial, deep or posterior cervical adenopathy.     Left cervical: No superficial, deep or posterior cervical adenopathy.      Upper Body:      Right upper body: No supraclavicular adenopathy.      Left upper body: No supraclavicular adenopathy.   Skin:     General: Skin is warm.      Capillary Refill: Capillary refill takes less than 2 seconds.      Coloration: Skin is mottled (bilateral palms).   Neurological:      Mental Status: She is alert and oriented to person, place, and time.      Cranial Nerves: No cranial nerve deficit, dysarthria or facial asymmetry.      Sensory: No sensory deficit.      Motor: No weakness, tremor, atrophy, abnormal muscle tone or seizure activity.      Gait: Gait normal.      Deep Tendon Reflexes: Reflexes are normal and symmetric. Reflexes normal.      Reflex  Scores:       Tricep reflexes are 2+ on the right side and 2+ on the left side.       Bicep reflexes are 2+ on the right side and 2+ on the left side.       Brachioradialis reflexes are 2+ on the right side and 2+ on the left side.       Patellar reflexes are 2+ on the right side and 2+ on the left side.       Achilles reflexes are 2+ on the right side and 2+ on the left side.  Psychiatric:         Mood and Affect: Mood normal.         Thought Content: Thought content normal.         Judgment: Judgment normal.        Assessment & Orders     1. Painful swelling of joint    -     CBC Auto Differential; Future; Expected date: 04/03/2025  -     Comprehensive Metabolic Panel; Future; Expected date: 04/03/2025  -     C-Reactive Protein; Future; Expected date: 04/03/2025  -     Sedimentation rate; Future; Expected date: 04/03/2025  -     POCT urine dipstick without microscope    2. Leukocytes in urine    -     Urine Culture High Risk         PLAN:     Persist, worrisome generalized myalgias with pain and joint swelling of the hands waxing and waning since shingles vaccine 19 days ago. Symptoms are limiting use of hands. Physical exam above is most notable for swelling of the BL PIPJs of the index fingers and mottling of the skin on the hands. Neuro exam is unremarkable. Vaccine injury is possible but ordered testing above to consider infectious processes and screen for general inflammation. PCOT urine positive for moderate leukocytes, protein. Nitrite negative. She does not have  complaints or exam findings specific to UTI. The result could be contaminant but will and for culture to be further assess. Recommend ice, heat, movement, and close follow-up with PCP. Cannot advise use of NSAIDS given reported history of being told to not use them after an EGD.          Follow up for pcp visit to further discuss the joint pain and swelling.       PAUL Dennis       [1]   Social History  Tobacco Use    Smoking status:  Former     Types: Cigarettes    Smokeless tobacco: Never   Substance Use Topics    Alcohol use: Not Currently    Drug use: No

## 2025-04-03 NOTE — TELEPHONE ENCOUNTER
Patient has a same day appointment today with Dr. Hay at [facility]      From Ms. Raimrezashley  I'll go ahead and schedule an urgent care appointment

## 2025-04-03 NOTE — Clinical Note
Please review this note concerning a patient on your panel. She has an appointment with you on 5/12 but it might be good to have her seen in your office sooner given the limitations she is experiencing from pain and swelling in her hands that she says started after a vaccine a 19 days prior to her appointment with me.

## 2025-04-04 ENCOUNTER — LAB VISIT (OUTPATIENT)
Dept: LAB | Facility: HOSPITAL | Age: 50
End: 2025-04-04
Attending: NURSE PRACTITIONER
Payer: COMMERCIAL

## 2025-04-04 DIAGNOSIS — M25.40 PAINFUL SWELLING OF JOINT: ICD-10-CM

## 2025-04-04 LAB
ABSOLUTE EOSINOPHIL (OHS): 0.04 K/UL
ABSOLUTE MONOCYTE (OHS): 0.41 K/UL (ref 0.3–1)
ABSOLUTE NEUTROPHIL COUNT (OHS): 1.84 K/UL (ref 1.8–7.7)
ALBUMIN SERPL BCP-MCNC: 3.7 G/DL (ref 3.5–5.2)
ALP SERPL-CCNC: 56 UNIT/L (ref 40–150)
ALT SERPL W/O P-5'-P-CCNC: 14 UNIT/L (ref 10–44)
ANION GAP (OHS): 4 MMOL/L (ref 8–16)
AST SERPL-CCNC: 19 UNIT/L (ref 11–45)
BASOPHILS # BLD AUTO: 0.03 K/UL
BASOPHILS NFR BLD AUTO: 0.6 %
BILIRUB SERPL-MCNC: 0.9 MG/DL (ref 0.1–1)
BUN SERPL-MCNC: 14 MG/DL (ref 6–20)
CALCIUM SERPL-MCNC: 8.4 MG/DL (ref 8.7–10.5)
CHLORIDE SERPL-SCNC: 105 MMOL/L (ref 95–110)
CO2 SERPL-SCNC: 27 MMOL/L (ref 23–29)
CREAT SERPL-MCNC: 0.7 MG/DL (ref 0.5–1.4)
CRP SERPL-MCNC: 0.6 MG/L
ERYTHROCYTE [DISTWIDTH] IN BLOOD BY AUTOMATED COUNT: 12 % (ref 11.5–14.5)
ERYTHROCYTE [SEDIMENTATION RATE] IN BLOOD BY PHOTOMETRIC METHOD: 5 MM/HR
GFR SERPLBLD CREATININE-BSD FMLA CKD-EPI: >60 ML/MIN/1.73/M2
GLUCOSE SERPL-MCNC: 88 MG/DL (ref 70–110)
HCT VFR BLD AUTO: 41.6 % (ref 37–48.5)
HGB BLD-MCNC: 13.6 GM/DL (ref 12–16)
IMM GRANULOCYTES # BLD AUTO: 0.01 K/UL (ref 0–0.04)
IMM GRANULOCYTES NFR BLD AUTO: 0.2 % (ref 0–0.5)
LYMPHOCYTES # BLD AUTO: 2.46 K/UL (ref 1–4.8)
MCH RBC QN AUTO: 29.1 PG (ref 27–31)
MCHC RBC AUTO-ENTMCNC: 32.7 G/DL (ref 32–36)
MCV RBC AUTO: 89 FL (ref 82–98)
NUCLEATED RBC (/100WBC) (OHS): 0 /100 WBC
PLATELET # BLD AUTO: 198 K/UL (ref 150–450)
PMV BLD AUTO: 11.7 FL (ref 9.2–12.9)
POTASSIUM SERPL-SCNC: 4.4 MMOL/L (ref 3.5–5.1)
PROT SERPL-MCNC: 6.8 GM/DL (ref 6–8.4)
RBC # BLD AUTO: 4.67 M/UL (ref 4–5.4)
RELATIVE EOSINOPHIL (OHS): 0.8 %
RELATIVE LYMPHOCYTE (OHS): 51.4 % (ref 18–48)
RELATIVE MONOCYTE (OHS): 8.6 % (ref 4–15)
RELATIVE NEUTROPHIL (OHS): 38.4 % (ref 38–73)
SODIUM SERPL-SCNC: 136 MMOL/L (ref 136–145)
WBC # BLD AUTO: 4.79 K/UL (ref 3.9–12.7)

## 2025-04-04 PROCEDURE — 85025 COMPLETE CBC W/AUTO DIFF WBC: CPT

## 2025-04-04 PROCEDURE — 82040 ASSAY OF SERUM ALBUMIN: CPT

## 2025-04-04 PROCEDURE — 86140 C-REACTIVE PROTEIN: CPT

## 2025-04-04 PROCEDURE — 85652 RBC SED RATE AUTOMATED: CPT

## 2025-04-04 PROCEDURE — 36415 COLL VENOUS BLD VENIPUNCTURE: CPT | Mod: PN

## 2025-04-05 LAB — BACTERIA UR CULT: NORMAL

## 2025-04-07 ENCOUNTER — RESULTS FOLLOW-UP (OUTPATIENT)
Dept: PRIMARY CARE CLINIC | Facility: CLINIC | Age: 50
End: 2025-04-07

## 2025-04-08 ENCOUNTER — OFFICE VISIT (OUTPATIENT)
Dept: INTERNAL MEDICINE | Facility: CLINIC | Age: 50
End: 2025-04-08
Payer: COMMERCIAL

## 2025-04-08 ENCOUNTER — LAB VISIT (OUTPATIENT)
Dept: LAB | Facility: HOSPITAL | Age: 50
End: 2025-04-08
Attending: COUNSELOR
Payer: COMMERCIAL

## 2025-04-08 ENCOUNTER — PATIENT MESSAGE (OUTPATIENT)
Dept: INTERNAL MEDICINE | Facility: CLINIC | Age: 50
End: 2025-04-08

## 2025-04-08 VITALS
BODY MASS INDEX: 25.97 KG/M2 | WEIGHT: 161.63 LBS | HEIGHT: 66 IN | DIASTOLIC BLOOD PRESSURE: 60 MMHG | OXYGEN SATURATION: 98 % | HEART RATE: 60 BPM | SYSTOLIC BLOOD PRESSURE: 116 MMHG

## 2025-04-08 DIAGNOSIS — M25.50 POLYARTHRALGIA: ICD-10-CM

## 2025-04-08 DIAGNOSIS — M25.50 POLYARTHRALGIA: Primary | ICD-10-CM

## 2025-04-08 PROCEDURE — 86038 ANTINUCLEAR ANTIBODIES: CPT

## 2025-04-08 PROCEDURE — 36415 COLL VENOUS BLD VENIPUNCTURE: CPT | Mod: PN

## 2025-04-08 PROCEDURE — 84443 ASSAY THYROID STIM HORMONE: CPT

## 2025-04-08 PROCEDURE — 86200 CCP ANTIBODY: CPT

## 2025-04-08 PROCEDURE — 99999 PR PBB SHADOW E&M-EST. PATIENT-LVL III: CPT | Mod: PBBFAC,,, | Performed by: COUNSELOR

## 2025-04-08 PROCEDURE — 86431 RHEUMATOID FACTOR QUANT: CPT

## 2025-04-08 RX ORDER — IBUPROFEN 200 MG
200 TABLET ORAL EVERY 6 HOURS PRN
COMMUNITY

## 2025-04-08 NOTE — PROGRESS NOTES
Subjective:       Patient ID: Karla Washington is a 50 y.o. female with history of thyroid nodule, arthralgias.    Chief Complaint: Polyarthralgia [M25.50]    Patient is new to me, PCP is Dr. Geovani Gomez. Here today for the following:    History of Present Illness    CHIEF COMPLAINT:  Patient presents today for follow up of joint pain that started after receiving shingles vaccine    JOINT PAIN AND ASSOCIATED SYMPTOMS:  She initially experienced deep joint ache one day after receiving shingles vaccine 3/15/25, lasting approximately 36 hours before resolving. Two days later, she developed sudden severe pain with finger snapping, which progressed to affect multiple joints. Pain currently affects hands, elbows, toes, knees, and hips, with location and severity varying daily. Right hand is more tender compared to left. She reports associated redness and swelling in knuckles and elbows, though elbow symptoms not present today. Movement and distraction provide the most relief from pain. Heat therapy is beneficial while ice therapy exacerbates symptoms. She reports no noticeable pain relief with ibuprofen. She experienced jaw cramps that awakened her at night and severe headaches, though these symptoms have resolved in the past two days.  Other reported associated symptoms include increased stool hardness with straining, night sweats, disrupted sleep, and brain fog. She experiences fingers turning white with cold exposure, with pain occurring upon rewarming. Interested in HRT with OBGYN.  Denies fevers, chills, nausea/vomiting.  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    Has a history of EGD 06/23 with gastritis and normal pathology. Told to avoid NSAIDs    FAMILY HISTORY:  Paternal grandmother had rheumatoid arthritis. Mother had osteoarthritis. Extensive family history of thyroid disorders including mother with Graves'  "disease, sister with hypothyroidism, paternal aunt with hypothyroidism, another paternal aunt with hyperthyroidism, and great grandmother with thyroid disorder.    ROS:  General: -fever, -chills, +fatigue, -weight gain, -weight loss  Eyes: -vision changes, -redness, -discharge  ENT: -ear pain, -nasal congestion, -sore throat  Cardiovascular: -chest pain, -palpitations, -lower extremity edema  Respiratory: -cough, -shortness of breath  Gastrointestinal: -abdominal pain, -nausea, -vomiting, -diarrhea, +constipation, -blood in stool  Genitourinary: -dysuria, -hematuria, -frequency  Musculoskeletal: +joint pain, -muscle pain, +pain with movement, +limb pain, +joint stiffness, +muscle stiffness  Skin: -rash, -lesion  Neurological: -headache, -dizziness, +numbness, +tingling, +decreased sensation in extremities  Psychiatric: -anxiety, -depression, -sleep difficulty            Current Outpatient Medications   Medication Instructions    ammonium lactate (LAC-HYDRIN) 12 % lotion Topical (Top), 2 times daily, soles    b complex vitamins tablet 1 tablet, Daily    diclofenac sodium (VOLTAREN) 2 g, Topical (Top), 4 times daily    EPINEPHrine (EPIPEN 2-MALDONADO) 0.3 mg, Intramuscular, Once    ibuprofen (ADVIL,MOTRIN) 200 mg, Every 6 hours PRN    Lactobacillus acidophilus (PROBIOTIC) 10 billion cell Cap No dose, route, or frequency recorded.    magnesium citrate 100 mg Tab No dose, route, or frequency recorded.     Objective:      Vitals:    04/08/25 0842   BP: 116/60   Pulse: 60   SpO2: 98%   Weight: 73.3 kg (161 lb 9.6 oz)   Height: 5' 6" (1.676 m)   PainSc:   4   PainLoc: Generalized     Body mass index is 26.08 kg/m².    Physical Exam  Constitutional:       General: She is not in acute distress.     Appearance: Normal appearance. She is not ill-appearing, toxic-appearing or diaphoretic.   HENT:      Head: Normocephalic and atraumatic.      Right Ear: Tympanic membrane, ear canal and external ear normal. There is no impacted " cerumen.      Left Ear: Tympanic membrane, ear canal and external ear normal. There is no impacted cerumen.      Mouth/Throat:      Mouth: Mucous membranes are moist.      Pharynx: Oropharynx is clear. No oropharyngeal exudate or posterior oropharyngeal erythema.   Eyes:      General: No scleral icterus.        Right eye: No discharge.         Left eye: No discharge.      Extraocular Movements: Extraocular movements intact.      Conjunctiva/sclera: Conjunctivae normal.      Pupils: Pupils are equal, round, and reactive to light.   Cardiovascular:      Rate and Rhythm: Normal rate and regular rhythm.      Pulses: Normal pulses.      Heart sounds: Normal heart sounds.   Pulmonary:      Effort: Pulmonary effort is normal. No respiratory distress.      Breath sounds: Normal breath sounds. No stridor. No wheezing, rhonchi or rales.   Chest:      Chest wall: No tenderness.   Abdominal:      General: Abdomen is flat. Bowel sounds are normal. There is no distension.      Palpations: Abdomen is soft. There is no mass.      Tenderness: There is no abdominal tenderness. There is no guarding or rebound.      Hernia: No hernia is present.   Musculoskeletal:         General: Tenderness present. No swelling, deformity or signs of injury. Normal range of motion.      Cervical back: Normal range of motion.      Right lower leg: No edema.      Left lower leg: No edema.      Comments: Some tenderness at right wrist joint, thumb, and MCPs.   Positive Tinel's sign bilaterally.  Left hand less tender.  Pain with right wrist extension and supination/pronation.   Strength 5/5, sensations intact throughout, ROM normal.  No obvious deformities palpated.  No warmth, erythema, or swelling noted at joints.   Skin:     General: Skin is warm and dry.      Capillary Refill: Capillary refill takes less than 2 seconds.      Findings: No lesion or rash.   Neurological:      General: No focal deficit present.      Mental Status: She is alert and  oriented to person, place, and time. Mental status is at baseline.      Cranial Nerves: No cranial nerve deficit.      Sensory: No sensory deficit.      Motor: No weakness.      Gait: Gait normal.   Psychiatric:         Mood and Affect: Mood normal.         Behavior: Behavior normal.         Thought Content: Thought content normal.         Judgment: Judgment normal.         Assessment:       1. Polyarthralgia        IMPRESSION:  - Considered possible rheumatologic condition given symptoms, family history of rheumatoid arthritis, and normal inflammatory markers.  - Additional labs may be necessary to rule in/out conditions like rheumatoid arthritis.    Plan:     PLAN SUMMARY:  - Consulted SP regarding additional testing and potential rheumatologist referral  - Ordered additional labs    Polyarthralgia  - Monitor the patient's pain in multiple joints including toes, knees, hips, elbows, and hands. Predominance of wrist joints and MCP's. Pattern of pain with improvement with movement support possible rheumatologic condition but considered other diagnoses like carpal tunnel syndrome, osteoarthritis, tendonitis.   - Physical exam did not reveal any concerning findings that warrants immediate evaluation or intervention  - Order additional rheumatologic lab studies.  - If additional labs are negative we will consider referral to hand specialist.  - Recommended patient can try to wear wrist braces given exam consistent with carpal tunnel syndrome        -     JONATHAN Screen w/Reflex; Future; Expected date: 04/08/2025  -     Rheumatoid Factor; Future; Expected date: 04/08/2025  -     Cyclic Citrullinated Peptide Antibody, IgG; Future; Expected date: 04/08/2025    FOLLOW-UP:  - Follow up on lab studies with further recommendations based on results.        This note was generated with the assistance of ambient listening technology. Verbal consent was obtained by the patient and accompanying visitor(s) for the recording of patient  appointment to facilitate this note. I attest to having reviewed and edited the generated note for accuracy, though some syntax or spelling errors may persist. Please contact the author of this note for any clarification.    Blair Quezada PA-C    4/8/2025    Answers submitted by the patient for this visit:  Review of Systems Questionnaire (Submitted on 4/7/2025)  activity change: Yes  unexpected weight change: No  neck pain: Yes  hearing loss: No  rhinorrhea: No  trouble swallowing: No  eye discharge: No  visual disturbance: Yes  chest tightness: No  wheezing: No  chest pain: No  palpitations: No  blood in stool: No  constipation: Yes  vomiting: No  diarrhea: No  polydipsia: No  polyuria: No  difficulty urinating: No  hematuria: No  menstrual problem: No  dysuria: No  joint swelling: Yes  arthralgias: Yes  headaches: Yes  weakness: No  confusion: No  dysphoric mood: No

## 2025-04-09 ENCOUNTER — RESULTS FOLLOW-UP (OUTPATIENT)
Dept: INTERNAL MEDICINE | Facility: CLINIC | Age: 50
End: 2025-04-09
Payer: COMMERCIAL

## 2025-04-09 DIAGNOSIS — M25.50 POLYARTHRALGIA: Primary | ICD-10-CM

## 2025-04-09 LAB
ANA (OHS): NORMAL
CYCLIC CITRULLINATED PEPTIDE (CCP) (OHS): 1.3 U/ML
RHEUMATOID FACT SERPL-ACNC: <13 IU/ML
TSH SERPL-ACNC: 1.51 UIU/ML (ref 0.4–4)

## 2025-05-01 ENCOUNTER — PATIENT MESSAGE (OUTPATIENT)
Dept: ORTHOPEDICS | Facility: CLINIC | Age: 50
End: 2025-05-01
Payer: COMMERCIAL

## 2025-05-01 DIAGNOSIS — M79.641 BILATERAL HAND PAIN: Primary | ICD-10-CM

## 2025-05-01 DIAGNOSIS — M79.642 BILATERAL HAND PAIN: Primary | ICD-10-CM

## 2025-05-05 ENCOUNTER — OFFICE VISIT (OUTPATIENT)
Dept: ORTHOPEDICS | Facility: CLINIC | Age: 50
End: 2025-05-05
Payer: COMMERCIAL

## 2025-05-05 ENCOUNTER — HOSPITAL ENCOUNTER (OUTPATIENT)
Dept: RADIOLOGY | Facility: OTHER | Age: 50
Discharge: HOME | End: 2025-05-05
Attending: ORTHOPAEDIC SURGERY
Payer: COMMERCIAL

## 2025-05-05 VITALS — BODY MASS INDEX: 25.97 KG/M2 | WEIGHT: 161.63 LBS | HEIGHT: 66 IN

## 2025-05-05 DIAGNOSIS — M25.50 POLYARTHRALGIA: ICD-10-CM

## 2025-05-05 DIAGNOSIS — M79.641 BILATERAL HAND PAIN: ICD-10-CM

## 2025-05-05 DIAGNOSIS — M79.642 BILATERAL HAND PAIN: ICD-10-CM

## 2025-05-05 PROCEDURE — 73130 X-RAY EXAM OF HAND: CPT | Mod: 26,,, | Performed by: RADIOLOGY

## 2025-05-05 PROCEDURE — 1159F MED LIST DOCD IN RCRD: CPT | Mod: CPTII,S$GLB,, | Performed by: ORTHOPAEDIC SURGERY

## 2025-05-05 PROCEDURE — 99204 OFFICE O/P NEW MOD 45 MIN: CPT | Mod: S$GLB,,, | Performed by: ORTHOPAEDIC SURGERY

## 2025-05-05 PROCEDURE — 99999 PR PBB SHADOW E&M-EST. PATIENT-LVL III: CPT | Mod: PBBFAC,,, | Performed by: ORTHOPAEDIC SURGERY

## 2025-05-05 PROCEDURE — 3008F BODY MASS INDEX DOCD: CPT | Mod: CPTII,S$GLB,, | Performed by: ORTHOPAEDIC SURGERY

## 2025-05-05 PROCEDURE — 73130 X-RAY EXAM OF HAND: CPT | Mod: TC,50,FY

## 2025-05-05 NOTE — PROGRESS NOTES
[facility]  History & Physical  Orthopedics    SUBJECTIVE:      COVID-19 attestation:  This patient was treated during the COVID-19 pandemic.  This was discussed with the patient, they are aware of our current policies and procedures, were given the option of delaying their visit and or switching to a virtual visit, delaying their surgery when applicable, and they elect to proceed.    Chief Complaint:  Bilateral thumb pain    Referring Provider: Bubba Quezada P*     History of Present Illness:  Patient is a 50 y.o. right hand dominant female with a history of polyarthritis who presents today with complaints of  bilateral thumb pain.  Right thumb pain is worse in the left and it started about 1-1/2 year ago.  Pain is worse with  and opposition of the thumb.  She also endorsed occasional bilateral numbness and tingling to her thumb, index and long fingers.  She reports that she does stretches but she has not tried therapy for it.    The patient is a/an .    Onset of symptoms/DOI was 1-1/2 years ago.    Symptoms are aggravated by activity and during the day.    Symptoms are alleviated by rest.    Symptoms consist of pain and decreased ROM.    Attempted treatment(s) and/or interventions include activity modifications, rest, rest.     The patient denies any fevers, chills, N/V, D/C and presents for evaluation.       Past Medical History:   Diagnosis Date    Abnormal Pap smear of cervix     Angio-edema     Fibroid of cervix     History of colposcopy 1997    x2   1997, 2011     Past Surgical History:   Procedure Laterality Date    COLONOSCOPY N/A 9/3/2024    Procedure: COLONOSCOPY;  Surgeon: Keven Feldman MD;  Location: [facility] ENDOSCOPY;  Service: Endoscopy;  Laterality: N/A;  6/18/24- r/s to later date, peg, instr to portal. DBM  pt aware of cld/prep on Labor Day.  8/26/24- pc complete. DBM    ESOPHAGOGASTRODUODENOSCOPY N/A 6/9/2023    Procedure: EGD  "(ESOPHAGOGASTRODUODENOSCOPY);  Surgeon: Jim Frias MD;  Location: [facility];  Service: Endoscopy;  Laterality: N/A;    TONSILLECTOMY       Review of patient's allergies indicates:   Allergen Reactions    Pcn [penicillins] Hives     Social History     Social History Narrative    The patient does exercise regularly (5-6x/week; ).    Rates diet as good.    She is not satisfied with weight.         Family History   Problem Relation Name Age of Onset    Thyroid disease Mother      Melanoma Mother      Alcohol abuse Father      Alcohol abuse Sister      Alcohol abuse Brother      Alcohol abuse Maternal Aunt      Thyroid disease Sister      Thyroid disease Paternal Grandmother      Macular degeneration Paternal Grandmother      Colon cancer Maternal Grandmother      Glaucoma Maternal Grandmother      Breast cancer Paternal Aunt      Bladder Cancer Maternal Grandfather          metastasized to his breast    Colon cancer Maternal Grandfather      Ovarian cancer Neg Hx      Blindness Neg Hx      Retinal detachment Neg Hx         Current Medications[1]      Review of Systems:  As per HPI otherwise noncontributory    OBJECTIVE:      Vital Signs (Most Recent):  Vitals:    05/05/25 0909   Weight: 73.3 kg (161 lb 9.6 oz)   Height: 5' 6" (1.676 m)     Body mass index is 26.08 kg/m².      Physical Exam:  Constitutional: The patient appears well-developed and well-nourished. No distress.   Skin: No lesions appreciated  Head: Normocephalic and atraumatic.   Nose: Nose normal.   Ears: No deformities seen  Eyes: Conjunctivae and EOM are normal.   Neck: No tracheal deviation present.   Cardiovascular: Normal rate and intact distal pulses.    Pulmonary/Chest: Effort normal. No respiratory distress.   Abdominal: There is no guarding.   Neurological: The patient is alert.   Psychiatric: The patient has a normal mood and affect.     Bilateral Hand/Wrist " Examination:    Observation/Inspection:  Swelling  none    Deformity  none  Discoloration  none     Scars   none    Atrophy  none    HAND/WRIST EXAMINATION:  Bilateral  Finkelstein's Test   positive on the right  WHAT Test    positive on the right  Snuff box tenderness   Neg  Deshpande's Test    Neg  Hook of [place]ate Tenderness  Neg  CMC grind    positive on the right  Circumduction test   Neg    Neurovascular Exam:  Bilateral  Digits WWP, brisk CR < 3s throughout  NVI motor/LTS to M/R/U nerves, radial pulse 2+  Tinel's Test - Carpal Tunnel  positive bilateral  Tinel's Test - Cubital Tunnel  positive bilaterally  Phalen's Test    positive bilateral  Median Nerve Compression Test positive bilateral    ROM hand full.  Pain at the CMC joint with thumb apposition    ROM wrist full, painless    ROM elbow full, painless      Diagnostic Results:     Imaging - I independently viewed the patient's imaging as well as the radiology report.  Xrays of the patient's bilateral hand  demonstrate moderate bilateral CMC arthritis. No evidence of any acute fractures or dislocations.    EMG - none    ASSESSMENT/PLAN:      50 y.o. yo female with   1. Bilateral carpal tunnel syndrome  2. Bilateral cubital tunnel syndrome  3. Bilateral CMC arthritis  4. Right de Quervain tenosynovitis    Plan: The patient and I had a thorough discussion today.  We discussed the working diagnosis as well as several other potential alternative diagnoses.  Treatment options were discussed, both conservative and surgical.  Conservative treatment options would include things such as activity modifications, workplace modifications, a period of rest, oral vs topical OTC and prescription anti-inflammatory medications, occupational therapy, splinting/bracing, immobilization, corticosteroid injections, and others.  Surgical options were discussed as well.     At this time, the patient would like to proceed with a trial of conservative management with bracing and  activity modification.  She declined steroid for symptomatic relief today.  She feels like her symptoms improve when she is able to decompress her joints with stretches.  She was provided a bilateral thumb spica braces today.    Should the patient's symptoms worsen, persist, or fail to improve they should return for reevaluation and I would be happy to see them back anytime.    Salty Baldwin MD  Orthopaedic Surgery   Resident Physician, PGY-1  05/05/2025             [1]   Current Outpatient Medications:     ammonium lactate (LAC-HYDRIN) 12 % lotion, Apply topically 2 (two) times daily. soles, Disp: 225 g, Rfl: 3    b complex vitamins tablet, Take 1 tablet by mouth once daily., Disp: , Rfl:     diclofenac sodium (VOLTAREN) 1 % Gel, Apply 2 g topically 4 (four) times daily., Disp: 2 g, Rfl: 1    EPINEPHrine (EPIPEN 2-MALDONADO) 0.3 mg/0.3 mL AtIn, Inject 0.3 mLs (0.3 mg total) into the muscle once. for 1 dose, Disp: 0.6 mL, Rfl: 5    ibuprofen (ADVIL,MOTRIN) 200 MG tablet, Take 200 mg by mouth every 6 (six) hours as needed for Pain., Disp: , Rfl:     Lactobacillus acidophilus (PROBIOTIC) 10 billion cell Cap, , Disp: , Rfl:     magnesium citrate 100 mg Tab, , Disp: , Rfl:

## 2025-06-03 ENCOUNTER — HOSPITAL ENCOUNTER (OUTPATIENT)
Dept: RADIOLOGY | Facility: OTHER | Age: 50
Discharge: HOME | End: 2025-06-03
Attending: OBSTETRICS & GYNECOLOGY
Payer: COMMERCIAL

## 2025-06-03 ENCOUNTER — TELEPHONE (OUTPATIENT)
Dept: OBSTETRICS AND GYNECOLOGY | Facility: CLINIC | Age: 50
End: 2025-06-03
Payer: COMMERCIAL

## 2025-06-03 DIAGNOSIS — Z12.31 VISIT FOR SCREENING MAMMOGRAM: ICD-10-CM

## 2025-06-03 PROCEDURE — 77063 BREAST TOMOSYNTHESIS BI: CPT | Mod: 26,,, | Performed by: RADIOLOGY

## 2025-06-03 PROCEDURE — 77067 SCR MAMMO BI INCL CAD: CPT | Mod: TC

## 2025-06-03 PROCEDURE — 77067 SCR MAMMO BI INCL CAD: CPT | Mod: 26,,, | Performed by: RADIOLOGY

## 2025-06-06 ENCOUNTER — PATIENT MESSAGE (OUTPATIENT)
Dept: OBSTETRICS AND GYNECOLOGY | Facility: CLINIC | Age: 50
End: 2025-06-06
Payer: COMMERCIAL

## 2025-06-09 ENCOUNTER — CLINICAL SUPPORT (OUTPATIENT)
Dept: OBSTETRICS AND GYNECOLOGY | Facility: CLINIC | Age: 50
End: 2025-06-09
Payer: COMMERCIAL

## 2025-06-09 DIAGNOSIS — N95.1 MENOPAUSAL SYMPTOMS: Primary | ICD-10-CM

## 2025-06-09 NOTE — PROGRESS NOTES
Personal Information    Full Name: Karla Washington 1975    PCP- Dr. Geovani Gomez     Medical History    Do you have a chronic medical condition? (e.g., diabetes, hypertension, thyroid issues)   If yes, please specify:   Yes - Thyroid nodules     2.   Do you have a history of hormone- related conditions? (e.g., endometriosis, breast cancer, or PCOS)   If yes, please explain:   No    3.   Have you previously or are you currently taking hormone replacement therapy?   If yes, please list:   No    Menstrual History    At what age did you begin menstruating?   13    2.   Have you experienced any changes in your menstrual cycle in the last year?   If yes, please describe:   No    3.   When was your last menstrual period or age of last period?   10/2024    4.   Have you had a hysterectomy or ablation?   No    5.   Do you still have your ovaries?   If yes, month or year:  N/A    Symptoms Assesments      Are you experiencing any of the following symptoms:  Hot Flashes: Yes   Night Sweats: Yes   Vaginal Dryness or Pain/ Discomfort with [place]: Yes   Mood Swings: Yes   Anxiety or Depression: Yes   Sleep Disturbances: Yes   Fatigue: Yes   Weight Gain: Yes   Joint or Muscle Pain: Yes   Memory Issues, Brain Fog or Loss of Focus: No   Decreased Interest in Sex (Low Libido): Yes     Lifestyle Factors    How would you describe your diet?  Other- Pescatarian     2.   How often do you exercise?   Regularly    3.   Do you smoke or consume alcohol?   If yes, please specify frequency:   No    4.   How would you rate your stress levels?   Low-Moderate    Family History    Is there a family history of menopause-related conditions? (e.g., osteoporosis, breast cancer)  If yes, please specify  Yes - Mother & Paternal GM osteoporosis & Paternal Aunt Breast cancer     2.   Is there a family history of heart disease?   If yes, please specify   Yes - Maternal GM Heart disease ( quadruple bypass)    3.   Have you had a  colonoscopy?  If yes, month or year:   Yes - 9/2024    4.   Have you had a mammogram?  If yes, month or year:  Yes - 6/2025    5.   Have you had an Annual/ Pap?   If yes, month or year:   Yes - 3/2025    6.   BMD (If patient is over 50)    No  ---------------------------------------------    Spoke with patient for a total of 30 minutes during virtual visit.  Patient was guided through expectations and treatment options.     Questions answered. Encouraged to send message or call office with any questions/concerns. Verbalized understanding.       Brittany